# Patient Record
Sex: FEMALE | Race: WHITE | URBAN - METROPOLITAN AREA
[De-identification: names, ages, dates, MRNs, and addresses within clinical notes are randomized per-mention and may not be internally consistent; named-entity substitution may affect disease eponyms.]

---

## 2020-11-25 ENCOUNTER — INPATIENT (INPATIENT)
Facility: HOSPITAL | Age: 80
LOS: 5 days | Discharge: HOME HEALTH | End: 2020-12-01
Attending: INTERNAL MEDICINE | Admitting: INTERNAL MEDICINE
Payer: MEDICARE

## 2020-11-25 VITALS
WEIGHT: 160.06 LBS | SYSTOLIC BLOOD PRESSURE: 149 MMHG | DIASTOLIC BLOOD PRESSURE: 68 MMHG | OXYGEN SATURATION: 89 % | RESPIRATION RATE: 22 BRPM | TEMPERATURE: 98 F | HEART RATE: 94 BPM

## 2020-11-25 DIAGNOSIS — J44.9 CHRONIC OBSTRUCTIVE PULMONARY DISEASE, UNSPECIFIED: ICD-10-CM

## 2020-11-25 DIAGNOSIS — J18.9 PNEUMONIA, UNSPECIFIED ORGANISM: ICD-10-CM

## 2020-11-25 DIAGNOSIS — U07.1 COVID-19: ICD-10-CM

## 2020-11-25 DIAGNOSIS — R09.02 HYPOXEMIA: ICD-10-CM

## 2020-11-25 LAB
ALBUMIN SERPL ELPH-MCNC: 3.4 G/DL — LOW (ref 3.5–5.2)
ALP SERPL-CCNC: 72 U/L — SIGNIFICANT CHANGE UP (ref 30–115)
ALT FLD-CCNC: 23 U/L — SIGNIFICANT CHANGE UP (ref 0–41)
ANION GAP SERPL CALC-SCNC: 10 MMOL/L — SIGNIFICANT CHANGE UP (ref 7–14)
APPEARANCE UR: CLEAR — SIGNIFICANT CHANGE UP
AST SERPL-CCNC: 31 U/L — SIGNIFICANT CHANGE UP (ref 0–41)
BACTERIA # UR AUTO: ABNORMAL
BASOPHILS # BLD AUTO: 0.01 K/UL — SIGNIFICANT CHANGE UP (ref 0–0.2)
BASOPHILS NFR BLD AUTO: 0.2 % — SIGNIFICANT CHANGE UP (ref 0–1)
BILIRUB SERPL-MCNC: <0.2 MG/DL — SIGNIFICANT CHANGE UP (ref 0.2–1.2)
BILIRUB UR-MCNC: NEGATIVE — SIGNIFICANT CHANGE UP
BUN SERPL-MCNC: 20 MG/DL — SIGNIFICANT CHANGE UP (ref 10–20)
CALCIUM SERPL-MCNC: 9 MG/DL — SIGNIFICANT CHANGE UP (ref 8.5–10.1)
CHLORIDE SERPL-SCNC: 104 MMOL/L — SIGNIFICANT CHANGE UP (ref 98–110)
CO2 SERPL-SCNC: 21 MMOL/L — SIGNIFICANT CHANGE UP (ref 17–32)
COLOR SPEC: YELLOW — SIGNIFICANT CHANGE UP
CREAT SERPL-MCNC: 1.2 MG/DL — SIGNIFICANT CHANGE UP (ref 0.7–1.5)
D DIMER BLD IA.RAPID-MCNC: 386 NG/ML DDU — HIGH (ref 0–230)
DIFF PNL FLD: NEGATIVE — SIGNIFICANT CHANGE UP
EOSINOPHIL # BLD AUTO: 0.01 K/UL — SIGNIFICANT CHANGE UP (ref 0–0.7)
EOSINOPHIL NFR BLD AUTO: 0.2 % — SIGNIFICANT CHANGE UP (ref 0–8)
EPI CELLS # UR: ABNORMAL /HPF
GLUCOSE BLDC GLUCOMTR-MCNC: 285 MG/DL — HIGH (ref 70–99)
GLUCOSE SERPL-MCNC: 126 MG/DL — HIGH (ref 70–99)
GLUCOSE UR QL: NEGATIVE MG/DL — SIGNIFICANT CHANGE UP
HCT VFR BLD CALC: 33.4 % — LOW (ref 37–47)
HGB BLD-MCNC: 10.3 G/DL — LOW (ref 12–16)
IMM GRANULOCYTES NFR BLD AUTO: 0.5 % — HIGH (ref 0.1–0.3)
KETONES UR-MCNC: NEGATIVE — SIGNIFICANT CHANGE UP
LEUKOCYTE ESTERASE UR-ACNC: NEGATIVE — SIGNIFICANT CHANGE UP
LYMPHOCYTES # BLD AUTO: 0.48 K/UL — LOW (ref 1.2–3.4)
LYMPHOCYTES # BLD AUTO: 11.1 % — LOW (ref 20.5–51.1)
MAGNESIUM SERPL-MCNC: 1.8 MG/DL — SIGNIFICANT CHANGE UP (ref 1.8–2.4)
MCHC RBC-ENTMCNC: 25.8 PG — LOW (ref 27–31)
MCHC RBC-ENTMCNC: 30.8 G/DL — LOW (ref 32–37)
MCV RBC AUTO: 83.7 FL — SIGNIFICANT CHANGE UP (ref 81–99)
MONOCYTES # BLD AUTO: 0.26 K/UL — SIGNIFICANT CHANGE UP (ref 0.1–0.6)
MONOCYTES NFR BLD AUTO: 6 % — SIGNIFICANT CHANGE UP (ref 1.7–9.3)
NEUTROPHILS # BLD AUTO: 3.55 K/UL — SIGNIFICANT CHANGE UP (ref 1.4–6.5)
NEUTROPHILS NFR BLD AUTO: 82 % — HIGH (ref 42.2–75.2)
NITRITE UR-MCNC: NEGATIVE — SIGNIFICANT CHANGE UP
NRBC # BLD: 0 /100 WBCS — SIGNIFICANT CHANGE UP (ref 0–0)
PH UR: 6 — SIGNIFICANT CHANGE UP (ref 5–8)
PLATELET # BLD AUTO: 241 K/UL — SIGNIFICANT CHANGE UP (ref 130–400)
POTASSIUM SERPL-MCNC: 4.7 MMOL/L — SIGNIFICANT CHANGE UP (ref 3.5–5)
POTASSIUM SERPL-SCNC: 4.7 MMOL/L — SIGNIFICANT CHANGE UP (ref 3.5–5)
PROT SERPL-MCNC: 6.2 G/DL — SIGNIFICANT CHANGE UP (ref 6–8)
PROT UR-MCNC: ABNORMAL MG/DL
RAPID RVP RESULT: DETECTED
RBC # BLD: 3.99 M/UL — LOW (ref 4.2–5.4)
RBC # FLD: 16.3 % — HIGH (ref 11.5–14.5)
SARS-COV-2 RNA SPEC QL NAA+PROBE: DETECTED
SODIUM SERPL-SCNC: 135 MMOL/L — SIGNIFICANT CHANGE UP (ref 135–146)
SP GR SPEC: 1.01 — SIGNIFICANT CHANGE UP (ref 1.01–1.03)
TROPONIN T SERPL-MCNC: <0.01 NG/ML — SIGNIFICANT CHANGE UP
UROBILINOGEN FLD QL: 0.2 MG/DL — SIGNIFICANT CHANGE UP (ref 0.2–0.2)
WBC # BLD: 4.33 K/UL — LOW (ref 4.8–10.8)
WBC # FLD AUTO: 4.33 K/UL — LOW (ref 4.8–10.8)
WBC UR QL: SIGNIFICANT CHANGE UP /HPF

## 2020-11-25 PROCEDURE — 99223 1ST HOSP IP/OBS HIGH 75: CPT

## 2020-11-25 PROCEDURE — 70450 CT HEAD/BRAIN W/O DYE: CPT | Mod: 26

## 2020-11-25 PROCEDURE — 71275 CT ANGIOGRAPHY CHEST: CPT | Mod: 26

## 2020-11-25 PROCEDURE — 99285 EMERGENCY DEPT VISIT HI MDM: CPT

## 2020-11-25 PROCEDURE — 99497 ADVNCD CARE PLAN 30 MIN: CPT | Mod: 25

## 2020-11-25 PROCEDURE — 71045 X-RAY EXAM CHEST 1 VIEW: CPT | Mod: 26

## 2020-11-25 RX ORDER — MEMANTINE HYDROCHLORIDE 10 MG/1
10 TABLET ORAL
Refills: 0 | Status: DISCONTINUED | OUTPATIENT
Start: 2020-11-25 | End: 2020-12-01

## 2020-11-25 RX ORDER — GABAPENTIN 400 MG/1
100 CAPSULE ORAL AT BEDTIME
Refills: 0 | Status: DISCONTINUED | OUTPATIENT
Start: 2020-11-25 | End: 2020-12-01

## 2020-11-25 RX ORDER — ATORVASTATIN CALCIUM 80 MG/1
40 TABLET, FILM COATED ORAL AT BEDTIME
Refills: 0 | Status: DISCONTINUED | OUTPATIENT
Start: 2020-11-25 | End: 2020-12-01

## 2020-11-25 RX ORDER — AZITHROMYCIN 500 MG/1
500 TABLET, FILM COATED ORAL EVERY 24 HOURS
Refills: 0 | Status: DISCONTINUED | OUTPATIENT
Start: 2020-11-25 | End: 2020-11-28

## 2020-11-25 RX ORDER — ASPIRIN/CALCIUM CARB/MAGNESIUM 324 MG
81 TABLET ORAL DAILY
Refills: 0 | Status: DISCONTINUED | OUTPATIENT
Start: 2020-11-25 | End: 2020-12-01

## 2020-11-25 RX ORDER — DEXAMETHASONE 0.5 MG/5ML
6 ELIXIR ORAL DAILY
Refills: 0 | Status: DISCONTINUED | OUTPATIENT
Start: 2020-11-25 | End: 2020-11-25

## 2020-11-25 RX ORDER — CEFTRIAXONE 500 MG/1
1000 INJECTION, POWDER, FOR SOLUTION INTRAMUSCULAR; INTRAVENOUS EVERY 24 HOURS
Refills: 0 | Status: DISCONTINUED | OUTPATIENT
Start: 2020-11-25 | End: 2020-11-28

## 2020-11-25 RX ORDER — DEXAMETHASONE 0.5 MG/5ML
6 ELIXIR ORAL ONCE
Refills: 0 | Status: COMPLETED | OUTPATIENT
Start: 2020-11-25 | End: 2020-11-25

## 2020-11-25 RX ORDER — NIFEDIPINE 30 MG
60 TABLET, EXTENDED RELEASE 24 HR ORAL DAILY
Refills: 0 | Status: DISCONTINUED | OUTPATIENT
Start: 2020-11-25 | End: 2020-12-01

## 2020-11-25 RX ORDER — AZITHROMYCIN 500 MG/1
500 TABLET, FILM COATED ORAL ONCE
Refills: 0 | Status: COMPLETED | OUTPATIENT
Start: 2020-11-25 | End: 2020-11-25

## 2020-11-25 RX ORDER — CEFTRIAXONE 500 MG/1
1000 INJECTION, POWDER, FOR SOLUTION INTRAMUSCULAR; INTRAVENOUS ONCE
Refills: 0 | Status: COMPLETED | OUTPATIENT
Start: 2020-11-25 | End: 2020-11-25

## 2020-11-25 RX ORDER — METFORMIN HYDROCHLORIDE 850 MG/1
500 TABLET ORAL
Refills: 0 | Status: DISCONTINUED | OUTPATIENT
Start: 2020-11-25 | End: 2020-11-26

## 2020-11-25 RX ORDER — IPRATROPIUM/ALBUTEROL SULFATE 18-103MCG
3 AEROSOL WITH ADAPTER (GRAM) INHALATION EVERY 6 HOURS
Refills: 0 | Status: DISCONTINUED | OUTPATIENT
Start: 2020-11-25 | End: 2020-12-01

## 2020-11-25 RX ADMIN — ATORVASTATIN CALCIUM 40 MILLIGRAM(S): 80 TABLET, FILM COATED ORAL at 22:57

## 2020-11-25 RX ADMIN — CEFTRIAXONE 100 MILLIGRAM(S): 500 INJECTION, POWDER, FOR SOLUTION INTRAMUSCULAR; INTRAVENOUS at 18:10

## 2020-11-25 RX ADMIN — AZITHROMYCIN 255 MILLIGRAM(S): 500 TABLET, FILM COATED ORAL at 19:30

## 2020-11-25 RX ADMIN — Medication 6 MILLIGRAM(S): at 18:11

## 2020-11-25 RX ADMIN — AZITHROMYCIN 255 MILLIGRAM(S): 500 TABLET, FILM COATED ORAL at 22:11

## 2020-11-25 RX ADMIN — GABAPENTIN 100 MILLIGRAM(S): 400 CAPSULE ORAL at 22:57

## 2020-11-25 RX ADMIN — CEFTRIAXONE 100 MILLIGRAM(S): 500 INJECTION, POWDER, FOR SOLUTION INTRAMUSCULAR; INTRAVENOUS at 22:11

## 2020-11-25 NOTE — H&P ADULT - ASSESSMENT
Pt is an 81 y/o female with Pneumonia/+COVID w hx COPD    IV abx  Decadron 6mg IV q24h  ID consult  Repeat labs in am  Monitor pulse ox, titrate to maintain Pox>90%  Airborne Isolation  Transfer to [facility] for further management  Duoneb  DVT Prophylaxis      DM/Hypercholesterolemia  Continue Metformin  Monitor FS  Carb consistent/DASH

## 2020-11-25 NOTE — ED ADULT NURSE NOTE - NS_NURSE_BED_LOCATION_ED_ALL_ED_FT
[facility] [id number] on Medical Surgical Unit

## 2020-11-25 NOTE — ED PROVIDER NOTE - CLINICAL SUMMARY MEDICAL DECISION MAKING FREE TEXT BOX
patient presents with increasing sob, we sent labs including ddimer which was mildly elevated followed by ct which was negative for pe she was found to be covid + I will admit to [facility] for further management family updated and agree with the plan of care.  patients history unreliable based on history of dementia but history corroborated by family

## 2020-11-25 NOTE — GOALS OF CARE CONVERSATION - ADVANCED CARE PLANNING - CONVERSATION DETAILS
Patient's overall medical care discussed with her and daughter Ines.  Aware of the current transfer to [place] for COVID - 19 PNA.  Patient with multiple comorbidities HTN. DM II, early onset Dementia, COPD; MOLST form discussed and patient wishes to remain FULL CODE including resuscitation and intubation if needed (confirmed with daughter over the phone)

## 2020-11-25 NOTE — H&P ADULT - HISTORY OF PRESENT ILLNESS
Pt is an 81 y/o female who presented to ER with a 2-3 day history of non productive cough and mild SOB. She denies CP, fever/chills, N/V/D, or additional complaints.   In ER pt found to be hypoxic at 88% on RA, which improved following application of 2L NC.   CXR pertinent for Right sided interstitial opacity. COVID +.

## 2020-11-25 NOTE — H&P ADULT - NSICDXPASTMEDICALHX_GEN_ALL_CORE_FT
PAST MEDICAL HISTORY:  COPD (chronic obstructive pulmonary disease)     Dementia     Depression     Diabetes mellitus, type 2     Essential hypertension     High blood cholesterol

## 2020-11-25 NOTE — ED PROVIDER NOTE - PHYSICAL EXAMINATION
Physical Exam    Vital Signs: I have reviewed the initial vital signs.  Constitutional: well-nourished, appears stated age, no acute distress  Eyes: Conjunctiva pink, Sclera clear  Cardiovascular: S1 and S2, regular rate, regular rhythm, well-perfused extremities, radial pulses equal and 2+, pedal pulses 2+ and equal   Respiratory: unlabored respiratory effort, clear to auscultation bilaterally no wheezing, rales and rhonchi  Gastrointestinal: soft, non-tender abdomen, no pulsatile mass, normal bowl sounds  Musculoskeletal: supple neck, no lower extremity edema, no midline tenderness  Integumentary: warm, dry, no rash  Neurologic: awake, alert, nvi

## 2020-11-25 NOTE — H&P ADULT - ATTENDING COMMENTS
Patient seen and examined at bedside independently of PA and agree with the H/P unless otherwise stated    1) Acute respiratory failure with hypoxia/COVID-19 PNA  -IV abx, one time decadron dose given in ED  -ID consult   -supplemental O2 to keep pulse ox > 90%  -transfer to [place]; hospitalist on call and MAR aware     2) COPD hx  -Pulmonary consult     3) Early onset Dementia/Mild cognitive impairment   -outpatient Psych follow up    4) CKD stage III (likely underlying HTN/DM II)  -needs to monitor kidney function and outpatient Nephrology follow up    5) HTN  -monitor BP and continue with home meds    6) DM II  -monitor FS  -on oral metformin     dvt and gi ppx   FULL CODE  --- see GOC     oob to chair as tolerated with Assistance       # Progress Note Handoff  PENDING as follows  consults: ID   Test: CBC  Family discussion: discussed with patient and daughter over the phone; aware of the above plan of care and agreeable for [place] transfer   Disposition: ACUTE     Attending Physician Dr. Jennifer Black # [id number]

## 2020-11-25 NOTE — H&P ADULT - NSHPPHYSICALEXAM_GEN_ALL_CORE
GEn NAD, A&Ox3            Vital Signs Last 24 Hrs  T(C): 36.4 (25 Nov 2020 12:03), Max: 36.4 (25 Nov 2020 12:03)  T(F): 97.6 (25 Nov 2020 12:03), Max: 97.6 (25 Nov 2020 12:03)  HR: 80 (25 Nov 2020 18:22) (76 - 94)  BP: 148/65 (25 Nov 2020 18:22) (132/63 - 151/67)  BP(mean): --  RR: 17 (25 Nov 2020 18:22) (17 - 22)  SpO2: 96% (25 Nov 2020 18:22) (89% - 96%)

## 2020-11-25 NOTE — H&P ADULT - NSHPLABSRESULTS_GEN_ALL_CORE
10.3   4.33  )-----------( 241      ( 2020 12:52 )             33.4           135  |  104  |  20  ----------------------------<  126<H>  4.7   |  21  |  1.2    Ca    9.0      2020 12:52  Mg     1.8         TPro  6.2  /  Alb  3.4<L>  /  TBili  <0.2  /  DBili  x   /  AST  31  /  ALT  23  /  AlkPhos  72                Urinalysis Basic - ( 2020 16:49 )    Color: Yellow / Appearance: Clear / S.015 / pH: x  Gluc: x / Ketone: Negative  / Bili: Negative / Urobili: 0.2 mg/dL   Blood: x / Protein: Trace mg/dL / Nitrite: Negative   Leuk Esterase: Negative / RBC: x / WBC 1-2 /HPF   Sq Epi: x / Non Sq Epi: Occasional /HPF / Bacteria: Few            Lactate Trend      CARDIAC MARKERS ( 2020 12:52 )  x     / <0.01 ng/mL / x     / x     / x            CAPILLARY BLOOD GLUCOSE          < from: Xray Chest 1 View-PORTABLE IMMEDIATE (Xray Chest 1 View-PORTABLE IMMEDIATE .) (20 @ 13:16) >    Impression:    Enlargement to the right hilum. Further evaluation with a PA and lateral view the chest is recommended    Right-sided interstitial opacities        < end of copied text >    < from: CT Head No Cont (20 @ 14:09) >    MPRESSION:    No acute intracranial pathology.    Mild  chronic microvascular ischemic changes.    < end of copied text >    < from: CT Angio Chest PE Protocol w/ IV Cont (20 @ 15:23) >    MPRESSION:    1. Bilateral, peripheral groundglass airspace opacities, which may be attributed to viral pneumonia in the appropriate clinical setting.    2. No evidence of acute pulmonary embolism.    3. Nonspecific prominent right hilar lymph node, 1.4 cm short axis, which may be reactive.          < end of copied text >

## 2020-11-25 NOTE — ED PROVIDER NOTE - ATTENDING CONTRIBUTION TO CARE
I was present for and supervised the key and critical aspects of the procedures performed during the care of the patient. I personally evaluated the patient. I reviewed the Resident’s or Physician Assistant’s note (as assigned above), and agree with the findings and plan except as documented in my note.  79 y/o F with PMHx HTN, DM, dementia and COPD presents with shortness of breath and dizziness x1 week. No fever, chills, cough, CP, diaphoresis or n/v/d. On exam: NCAT. PERRLA, EOMI. OP clear. Lungs no respiratory distress, CTAB. RRR, S1S2 noted. Radial pulses 2+ and equal, pedal pulses 2+ and equal. Abdomen soft, NT/ND, no rebound or guarding. FROM x4 extremities. No focal neuro deficits. A/P: COVID, labs, EKG CXR and re-eval.

## 2020-11-25 NOTE — ED PROVIDER NOTE - OBJECTIVE STATEMENT
79 yo female, pmh of dementia, copd, htn, hld, dm, depression, presents to ed for sob and lightheadedness. as per pt feels light headed and sob with exertion, mild, present for several days, no pain or radiation, daughter collaborates history. denies fever, chills, cp, le swelling, abd pain, nvd, dysuria.

## 2020-11-25 NOTE — ED PROVIDER NOTE - CHIEF COMPLAINT
Occupational Therapy Note: 
  
OT evaluation attempted and deferred. Per RN pt unavailable to participate at time of attempt. Will continue to follow and complete OT evaluation when pt available and appropriate. The patient is a 80y Female complaining of shortness of breath.

## 2020-11-25 NOTE — ED ADULT NURSE NOTE - NSIMPLEMENTINTERV_GEN_ALL_ED
Implemented All Fall with Harm Risk Interventions:  [place] to call system. Call bell, personal items and telephone within reach. Instruct patient to call for assistance. Room bathroom lighting operational. Non-slip footwear when patient is off stretcher. Physically safe environment: no spills, clutter or unnecessary equipment. Stretcher in lowest position, wheels locked, appropriate side rails in place. Provide visual cue, wrist band, yellow gown, etc. Monitor gait and stability. Monitor for mental status changes and reorient to person, place, and time. Review medications for side effects contributing to fall risk. Reinforce activity limits and safety measures with patient and family. Provide visual clues: red socks.

## 2020-11-25 NOTE — ED PROVIDER NOTE - PROGRESS NOTE DETAILS
I personally evaluated the patient. I reviewed the Resident’s or Physician Assistant’s note (as assigned above), and agree with the findings and plan except as documented in my note.  81 y/o F with PMHx HTN, DM, dementia and COPD presents with shortness of breath and dizziness x1 week. No fever, chills, cough, CP, diaphoresis or n/v/d. On exam: NCAT. PERRLA, EOMI. OP clear. Lungs no respiratory distress, CTAB. RRR, S1S2 noted. Radial pulses 2+ and equal, pedal pulses 2+ and equal. Abdomen soft, NT/ND, no rebound or guarding. FROM x4 extremities. No focal neuro deficits. A/P: COVID, labs, EKG CXR and re-eval.

## 2020-11-25 NOTE — ED ADULT NURSE NOTE - PMH
COPD (chronic obstructive pulmonary disease)    Dementia    Depression    Diabetes mellitus, type 2    Essential hypertension    High blood cholesterol

## 2020-11-26 LAB
ALBUMIN SERPL ELPH-MCNC: 3.7 G/DL — SIGNIFICANT CHANGE UP (ref 3.5–5.2)
ALP SERPL-CCNC: 79 U/L — SIGNIFICANT CHANGE UP (ref 30–115)
ALT FLD-CCNC: 24 U/L — SIGNIFICANT CHANGE UP (ref 0–41)
ANION GAP SERPL CALC-SCNC: 13 MMOL/L — SIGNIFICANT CHANGE UP (ref 7–14)
ANION GAP SERPL CALC-SCNC: 16 MMOL/L — HIGH (ref 7–14)
APTT BLD: 30.4 SEC — SIGNIFICANT CHANGE UP (ref 27–39.2)
AST SERPL-CCNC: 35 U/L — SIGNIFICANT CHANGE UP (ref 0–41)
BASOPHILS # BLD AUTO: 0.01 K/UL — SIGNIFICANT CHANGE UP (ref 0–0.2)
BASOPHILS NFR BLD AUTO: 0.2 % — SIGNIFICANT CHANGE UP (ref 0–1)
BILIRUB SERPL-MCNC: <0.2 MG/DL — SIGNIFICANT CHANGE UP (ref 0.2–1.2)
BUN SERPL-MCNC: 21 MG/DL — HIGH (ref 10–20)
BUN SERPL-MCNC: 30 MG/DL — HIGH (ref 10–20)
CALCIUM SERPL-MCNC: 9 MG/DL — SIGNIFICANT CHANGE UP (ref 8.5–10.1)
CALCIUM SERPL-MCNC: 9.6 MG/DL — SIGNIFICANT CHANGE UP (ref 8.5–10.1)
CHLORIDE SERPL-SCNC: 106 MMOL/L — SIGNIFICANT CHANGE UP (ref 98–110)
CHLORIDE SERPL-SCNC: 106 MMOL/L — SIGNIFICANT CHANGE UP (ref 98–110)
CO2 SERPL-SCNC: 19 MMOL/L — SIGNIFICANT CHANGE UP (ref 17–32)
CO2 SERPL-SCNC: 20 MMOL/L — SIGNIFICANT CHANGE UP (ref 17–32)
CREAT SERPL-MCNC: 1.1 MG/DL — SIGNIFICANT CHANGE UP (ref 0.7–1.5)
CREAT SERPL-MCNC: 1.5 MG/DL — SIGNIFICANT CHANGE UP (ref 0.7–1.5)
CULTURE RESULTS: SIGNIFICANT CHANGE UP
D DIMER BLD IA.RAPID-MCNC: 350 NG/ML DDU — HIGH (ref 0–230)
EOSINOPHIL # BLD AUTO: 0 K/UL — SIGNIFICANT CHANGE UP (ref 0–0.7)
EOSINOPHIL NFR BLD AUTO: 0 % — SIGNIFICANT CHANGE UP (ref 0–8)
GLUCOSE BLDC GLUCOMTR-MCNC: 111 MG/DL — HIGH (ref 70–99)
GLUCOSE BLDC GLUCOMTR-MCNC: 124 MG/DL — HIGH (ref 70–99)
GLUCOSE BLDC GLUCOMTR-MCNC: 162 MG/DL — HIGH (ref 70–99)
GLUCOSE BLDC GLUCOMTR-MCNC: 202 MG/DL — HIGH (ref 70–99)
GLUCOSE SERPL-MCNC: 154 MG/DL — HIGH (ref 70–99)
GLUCOSE SERPL-MCNC: 190 MG/DL — HIGH (ref 70–99)
HCT VFR BLD CALC: 37.2 % — SIGNIFICANT CHANGE UP (ref 37–47)
HGB BLD-MCNC: 11.4 G/DL — LOW (ref 12–16)
IMM GRANULOCYTES NFR BLD AUTO: 0.6 % — HIGH (ref 0.1–0.3)
INR BLD: 0.9 RATIO — SIGNIFICANT CHANGE UP (ref 0.65–1.3)
LDH SERPL L TO P-CCNC: 301 — HIGH (ref 50–242)
LYMPHOCYTES # BLD AUTO: 0.64 K/UL — LOW (ref 1.2–3.4)
LYMPHOCYTES # BLD AUTO: 13.9 % — LOW (ref 20.5–51.1)
MCHC RBC-ENTMCNC: 25.8 PG — LOW (ref 27–31)
MCHC RBC-ENTMCNC: 30.6 G/DL — LOW (ref 32–37)
MCV RBC AUTO: 84.2 FL — SIGNIFICANT CHANGE UP (ref 81–99)
MONOCYTES # BLD AUTO: 0.21 K/UL — SIGNIFICANT CHANGE UP (ref 0.1–0.6)
MONOCYTES NFR BLD AUTO: 4.5 % — SIGNIFICANT CHANGE UP (ref 1.7–9.3)
NEUTROPHILS # BLD AUTO: 3.73 K/UL — SIGNIFICANT CHANGE UP (ref 1.4–6.5)
NEUTROPHILS NFR BLD AUTO: 80.8 % — HIGH (ref 42.2–75.2)
NRBC # BLD: 0 /100 WBCS — SIGNIFICANT CHANGE UP (ref 0–0)
PLATELET # BLD AUTO: 284 K/UL — SIGNIFICANT CHANGE UP (ref 130–400)
POTASSIUM SERPL-MCNC: 4.4 MMOL/L — SIGNIFICANT CHANGE UP (ref 3.5–5)
POTASSIUM SERPL-MCNC: 5.7 MMOL/L — HIGH (ref 3.5–5)
POTASSIUM SERPL-SCNC: 4.4 MMOL/L — SIGNIFICANT CHANGE UP (ref 3.5–5)
POTASSIUM SERPL-SCNC: 5.7 MMOL/L — HIGH (ref 3.5–5)
PROCALCITONIN SERPL-MCNC: 0.11 NG/ML — HIGH (ref 0.02–0.1)
PROT SERPL-MCNC: 7 G/DL — SIGNIFICANT CHANGE UP (ref 6–8)
PROTHROM AB SERPL-ACNC: 10.3 SEC — SIGNIFICANT CHANGE UP (ref 9.95–12.87)
RBC # BLD: 4.42 M/UL — SIGNIFICANT CHANGE UP (ref 4.2–5.4)
RBC # FLD: 16.3 % — HIGH (ref 11.5–14.5)
SARS-COV-2 IGG SERPL QL IA: NEGATIVE — SIGNIFICANT CHANGE UP
SARS-COV-2 IGM SERPL IA-ACNC: 0.09 INDEX — SIGNIFICANT CHANGE UP
SODIUM SERPL-SCNC: 138 MMOL/L — SIGNIFICANT CHANGE UP (ref 135–146)
SODIUM SERPL-SCNC: 142 MMOL/L — SIGNIFICANT CHANGE UP (ref 135–146)
SPECIMEN SOURCE: SIGNIFICANT CHANGE UP
WBC # BLD: 4.62 K/UL — LOW (ref 4.8–10.8)
WBC # FLD AUTO: 4.62 K/UL — LOW (ref 4.8–10.8)

## 2020-11-26 PROCEDURE — 99233 SBSQ HOSP IP/OBS HIGH 50: CPT

## 2020-11-26 RX ORDER — ENOXAPARIN SODIUM 100 MG/ML
40 INJECTION SUBCUTANEOUS DAILY
Refills: 0 | Status: DISCONTINUED | OUTPATIENT
Start: 2020-11-26 | End: 2020-12-01

## 2020-11-26 RX ORDER — DEXAMETHASONE 0.5 MG/5ML
6 ELIXIR ORAL DAILY
Refills: 0 | Status: DISCONTINUED | OUTPATIENT
Start: 2020-11-26 | End: 2020-12-01

## 2020-11-26 RX ORDER — SODIUM ZIRCONIUM CYCLOSILICATE 10 G/10G
10 POWDER, FOR SUSPENSION ORAL ONCE
Refills: 0 | Status: COMPLETED | OUTPATIENT
Start: 2020-11-26 | End: 2020-11-26

## 2020-11-26 RX ADMIN — Medication 6 MILLIGRAM(S): at 17:07

## 2020-11-26 RX ADMIN — ENOXAPARIN SODIUM 40 MILLIGRAM(S): 100 INJECTION SUBCUTANEOUS at 17:08

## 2020-11-26 RX ADMIN — MEMANTINE HYDROCHLORIDE 10 MILLIGRAM(S): 10 TABLET ORAL at 17:08

## 2020-11-26 RX ADMIN — Medication 81 MILLIGRAM(S): at 11:07

## 2020-11-26 RX ADMIN — MEMANTINE HYDROCHLORIDE 10 MILLIGRAM(S): 10 TABLET ORAL at 06:26

## 2020-11-26 RX ADMIN — GABAPENTIN 100 MILLIGRAM(S): 400 CAPSULE ORAL at 21:28

## 2020-11-26 RX ADMIN — CEFTRIAXONE 100 MILLIGRAM(S): 500 INJECTION, POWDER, FOR SOLUTION INTRAMUSCULAR; INTRAVENOUS at 21:28

## 2020-11-26 RX ADMIN — METFORMIN HYDROCHLORIDE 500 MILLIGRAM(S): 850 TABLET ORAL at 06:26

## 2020-11-26 RX ADMIN — ATORVASTATIN CALCIUM 40 MILLIGRAM(S): 80 TABLET, FILM COATED ORAL at 21:28

## 2020-11-26 RX ADMIN — SODIUM ZIRCONIUM CYCLOSILICATE 10 GRAM(S): 10 POWDER, FOR SUSPENSION ORAL at 17:07

## 2020-11-26 RX ADMIN — Medication 30 MILLIGRAM(S): at 11:07

## 2020-11-26 RX ADMIN — Medication 60 MILLIGRAM(S): at 06:26

## 2020-11-26 RX ADMIN — AZITHROMYCIN 255 MILLIGRAM(S): 500 TABLET, FILM COATED ORAL at 21:29

## 2020-11-26 NOTE — PROGRESS NOTE ADULT - ASSESSMENT
Ms Curran is an 79 yo Woman with medical history of COPD (chronic obstructive pulmonary disease), Dementia, Depression, Diabetes mellitus type II, Essential hypertension, High blood cholesterol who presented to the hospital for non-productive cough and mild SOB.      #Acute respiratory failure with hypoxia 2/2 COVID-19 PNA  #Hx of COPD   desaturates to 88%  on 4L NC 94%   f/u ID consult   supplemental O2 to keep pulse ox > 90%  c/w Dexamethasone 6mg IV daily   Duoneb prn   c/w Ceftriaxone and Azithro for now    #Early onset Dementia/Mild cognitive impairment   c/w memantine    #CKD stage III   needs to monitor kidney function and outpatient Nephrology follow up    #HTN  /70  c/w Nifedipine 60mg daily     #DM II  monitor FS  ISS for now    Dispo: Acute   Full Code

## 2020-11-26 NOTE — PROGRESS NOTE ADULT - SUBJECTIVE AND OBJECTIVE BOX
Pt was seen and examined. No acute events overnight.    PAST MEDICAL & SURGICAL HISTORY:  Depression    Dementia    Essential hypertension    High blood cholesterol    Diabetes mellitus, type 2    COPD (chronic obstructive pulmonary disease)    No significant past surgical history    Allergies    No Known Allergies    MEDICATIONS  (STANDING):  albuterol/ipratropium for Nebulization 3 milliLiter(s) Nebulizer every 6 hours  aspirin  chewable 81 milliGRAM(s) Oral daily  atorvastatin 40 milliGRAM(s) Oral at bedtime  azithromycin  IVPB 500 milliGRAM(s) IV Intermittent every 24 hours  cefTRIAXone   IVPB 1000 milliGRAM(s) IV Intermittent every 24 hours  dexAMETHasone  Injectable 6 milliGRAM(s) IV Push daily  enoxaparin Injectable 40 milliGRAM(s) SubCutaneous daily  gabapentin 100 milliGRAM(s) Oral at bedtime  memantine 10 milliGRAM(s) Oral two times a day  NIFEdipine XL 60 milliGRAM(s) Oral daily  PARoxetine 30 milliGRAM(s) Oral daily    MEDICATIONS  (PRN):    ROS: All other review of systems negative, except as noted above    Vital Signs Last 24 Hrs  T(C): 36.2 (26 Nov 2020 16:57), Max: 37.2 (26 Nov 2020 12:20)  T(F): 97.1 (26 Nov 2020 16:57), Max: 98.9 (26 Nov 2020 12:20)  HR: 97 (26 Nov 2020 16:57) (80 - 97)  BP: 136/70 (26 Nov 2020 16:57) (107/61 - 165/74)  BP(mean): --  RR: 19 (26 Nov 2020 16:57) (16 - 19)  SpO2: 94% (26 Nov 2020 16:57) (88% - 96%)    Physical Exam:  Constitutional: Not in acute distress  SKIN: No rashes or lesions  HEENT: Atraumatic. Normocephalic. Anicteric  NECK:  No JVD.   PULMONARY: Clear Auscultation bilateraly. non labored respirations.   Cardiovascular: Normal S1, S2. Regular rate and rhythm. No murmurs.  ABDOMEN: Soft, Nontender, Nondistended; Bowel sounds are present  EXTREMITIES:  Non edematous, non cyanotic  NEUROLOGIC:  Alert & oriented x 3, No motor deficit.  PSYCH: normal affect    Labs:   CBC                        11.4   4.62  )-----------( 284      ( 26 Nov 2020 06:02 )             37.2   CMP  11-26    142  |  106  |  21<H>  ----------------------------<  154<H>  5.7<H>   |  20  |  1.1    Ca    9.6      26 Nov 2020 06:02  Mg     1.8     11-25    TPro  7.0  /  Alb  3.7  /  TBili  <0.2  /  DBili  x   /  AST  35  /  ALT  24  /  AlkPhos  79  11-26    Radiology:  CT Chest   IMPRESSION:    1. Bilateral, peripheral groundglass airspace opacities, which may be attributed to viral pneumonia in the appropriate clinical setting.    2. No evidence of acute pulmonary embolism.    3. Nonspecific prominent right hilar lymph node, 1.4 cm short axis, which may be reactive.

## 2020-11-27 LAB
ALBUMIN SERPL ELPH-MCNC: 3.6 G/DL — SIGNIFICANT CHANGE UP (ref 3.5–5.2)
ALP SERPL-CCNC: 76 U/L — SIGNIFICANT CHANGE UP (ref 30–115)
ALT FLD-CCNC: 22 U/L — SIGNIFICANT CHANGE UP (ref 0–41)
ANION GAP SERPL CALC-SCNC: 14 MMOL/L — SIGNIFICANT CHANGE UP (ref 7–14)
AST SERPL-CCNC: 35 U/L — SIGNIFICANT CHANGE UP (ref 0–41)
BASOPHILS # BLD AUTO: 0 K/UL — SIGNIFICANT CHANGE UP (ref 0–0.2)
BASOPHILS NFR BLD AUTO: 0 % — SIGNIFICANT CHANGE UP (ref 0–1)
BILIRUB SERPL-MCNC: <0.2 MG/DL — SIGNIFICANT CHANGE UP (ref 0.2–1.2)
BUN SERPL-MCNC: 24 MG/DL — HIGH (ref 10–20)
CALCIUM SERPL-MCNC: 9.4 MG/DL — SIGNIFICANT CHANGE UP (ref 8.5–10.1)
CHLORIDE SERPL-SCNC: 105 MMOL/L — SIGNIFICANT CHANGE UP (ref 98–110)
CO2 SERPL-SCNC: 22 MMOL/L — SIGNIFICANT CHANGE UP (ref 17–32)
CREAT SERPL-MCNC: 1.1 MG/DL — SIGNIFICANT CHANGE UP (ref 0.7–1.5)
D DIMER BLD IA.RAPID-MCNC: 300 NG/ML DDU — HIGH (ref 0–230)
EOSINOPHIL # BLD AUTO: 0 K/UL — SIGNIFICANT CHANGE UP (ref 0–0.7)
EOSINOPHIL NFR BLD AUTO: 0 % — SIGNIFICANT CHANGE UP (ref 0–8)
GLUCOSE BLDC GLUCOMTR-MCNC: 145 MG/DL — HIGH (ref 70–99)
GLUCOSE BLDC GLUCOMTR-MCNC: 153 MG/DL — HIGH (ref 70–99)
GLUCOSE BLDC GLUCOMTR-MCNC: 177 MG/DL — HIGH (ref 70–99)
GLUCOSE BLDC GLUCOMTR-MCNC: 249 MG/DL — HIGH (ref 70–99)
GLUCOSE SERPL-MCNC: 146 MG/DL — HIGH (ref 70–99)
HCT VFR BLD CALC: 36.8 % — LOW (ref 37–47)
HGB BLD-MCNC: 11.4 G/DL — LOW (ref 12–16)
IMM GRANULOCYTES NFR BLD AUTO: 0.3 % — SIGNIFICANT CHANGE UP (ref 0.1–0.3)
LYMPHOCYTES # BLD AUTO: 0.56 K/UL — LOW (ref 1.2–3.4)
LYMPHOCYTES # BLD AUTO: 7 % — LOW (ref 20.5–51.1)
MCHC RBC-ENTMCNC: 25.9 PG — LOW (ref 27–31)
MCHC RBC-ENTMCNC: 31 G/DL — LOW (ref 32–37)
MCV RBC AUTO: 83.4 FL — SIGNIFICANT CHANGE UP (ref 81–99)
MONOCYTES # BLD AUTO: 0.23 K/UL — SIGNIFICANT CHANGE UP (ref 0.1–0.6)
MONOCYTES NFR BLD AUTO: 2.9 % — SIGNIFICANT CHANGE UP (ref 1.7–9.3)
NEUTROPHILS # BLD AUTO: 7.16 K/UL — HIGH (ref 1.4–6.5)
NEUTROPHILS NFR BLD AUTO: 89.8 % — HIGH (ref 42.2–75.2)
NRBC # BLD: 0 /100 WBCS — SIGNIFICANT CHANGE UP (ref 0–0)
PLATELET # BLD AUTO: 326 K/UL — SIGNIFICANT CHANGE UP (ref 130–400)
POTASSIUM SERPL-MCNC: 4.9 MMOL/L — SIGNIFICANT CHANGE UP (ref 3.5–5)
POTASSIUM SERPL-SCNC: 4.9 MMOL/L — SIGNIFICANT CHANGE UP (ref 3.5–5)
PROT SERPL-MCNC: 7.1 G/DL — SIGNIFICANT CHANGE UP (ref 6–8)
RBC # BLD: 4.41 M/UL — SIGNIFICANT CHANGE UP (ref 4.2–5.4)
RBC # FLD: 16 % — HIGH (ref 11.5–14.5)
SODIUM SERPL-SCNC: 141 MMOL/L — SIGNIFICANT CHANGE UP (ref 135–146)
WBC # BLD: 7.97 K/UL — SIGNIFICANT CHANGE UP (ref 4.8–10.8)
WBC # FLD AUTO: 7.97 K/UL — SIGNIFICANT CHANGE UP (ref 4.8–10.8)

## 2020-11-27 PROCEDURE — 99233 SBSQ HOSP IP/OBS HIGH 50: CPT

## 2020-11-27 RX ADMIN — ENOXAPARIN SODIUM 40 MILLIGRAM(S): 100 INJECTION SUBCUTANEOUS at 11:43

## 2020-11-27 RX ADMIN — Medication 81 MILLIGRAM(S): at 11:43

## 2020-11-27 RX ADMIN — GABAPENTIN 100 MILLIGRAM(S): 400 CAPSULE ORAL at 21:56

## 2020-11-27 RX ADMIN — Medication 6 MILLIGRAM(S): at 05:22

## 2020-11-27 RX ADMIN — MEMANTINE HYDROCHLORIDE 10 MILLIGRAM(S): 10 TABLET ORAL at 17:01

## 2020-11-27 RX ADMIN — MEMANTINE HYDROCHLORIDE 10 MILLIGRAM(S): 10 TABLET ORAL at 05:23

## 2020-11-27 RX ADMIN — ATORVASTATIN CALCIUM 40 MILLIGRAM(S): 80 TABLET, FILM COATED ORAL at 21:56

## 2020-11-27 RX ADMIN — CEFTRIAXONE 100 MILLIGRAM(S): 500 INJECTION, POWDER, FOR SOLUTION INTRAMUSCULAR; INTRAVENOUS at 21:56

## 2020-11-27 RX ADMIN — Medication 60 MILLIGRAM(S): at 05:23

## 2020-11-27 RX ADMIN — AZITHROMYCIN 255 MILLIGRAM(S): 500 TABLET, FILM COATED ORAL at 21:56

## 2020-11-27 RX ADMIN — Medication 30 MILLIGRAM(S): at 11:43

## 2020-11-27 NOTE — PROGRESS NOTE ADULT - SUBJECTIVE AND OBJECTIVE BOX
SUBJECTIVE:    Patient is a 80y old Female who presents with a chief complaint of Acute respiratory failure with hypoxia (2020 08:58)    Currently admitted to medicine with the primary diagnosis of COVID-19       Today is hospital day 2d. This morning she is resting comfortably in bed and reports no new issues or overnight events.     INTERVAL EVENTS:   no acute events    PAST MEDICAL & SURGICAL HISTORY  Depression    Dementia    Essential hypertension    High blood cholesterol    Diabetes mellitus, type 2    COPD (chronic obstructive pulmonary disease)    No significant past surgical history        ALLERGIES:  No Known Allergies    MEDICATIONS:  STANDING MEDICATIONS  albuterol/ipratropium for Nebulization 3 milliLiter(s) Nebulizer every 6 hours  aspirin  chewable 81 milliGRAM(s) Oral daily  atorvastatin 40 milliGRAM(s) Oral at bedtime  azithromycin  IVPB 500 milliGRAM(s) IV Intermittent every 24 hours  cefTRIAXone   IVPB 1000 milliGRAM(s) IV Intermittent every 24 hours  dexAMETHasone  Injectable 6 milliGRAM(s) IV Push daily  enoxaparin Injectable 40 milliGRAM(s) SubCutaneous daily  gabapentin 100 milliGRAM(s) Oral at bedtime  memantine 10 milliGRAM(s) Oral two times a day  NIFEdipine XL 60 milliGRAM(s) Oral daily  PARoxetine 30 milliGRAM(s) Oral daily    PRN MEDICATIONS    VITALS:   T(F): 96.3  HR: 86  BP: 157/72  RR: 18  SpO2: 95%    LABS:                        11.4   7.97  )-----------( 326      ( 2020 08:01 )             36.8     11-27    141  |  105  |  24<H>  ----------------------------<  146<H>  4.9   |  22  |  1.1    Ca    9.4      2020 08:01  Mg     1.8     11-25    TPro  7.1  /  Alb  3.6  /  TBili  <0.2  /  DBili  x   /  AST  35  /  ALT  22  /  AlkPhos  76  11-    PT/INR - ( 2020 06:02 )   PT: 10.30 sec;   INR: 0.90 ratio         PTT - ( 2020 06:02 )  PTT:30.4 sec  Urinalysis Basic - ( 2020 16:49 )    Color: Yellow / Appearance: Clear / S.015 / pH: x  Gluc: x / Ketone: Negative  / Bili: Negative / Urobili: 0.2 mg/dL   Blood: x / Protein: Trace mg/dL / Nitrite: Negative   Leuk Esterase: Negative / RBC: x / WBC 1-2 /HPF   Sq Epi: x / Non Sq Epi: Occasional /HPF / Bacteria: Few            Culture - Urine (collected 2020 16:49)  Source: .Urine Clean Catch (Midstream)  Final Report (2020 22:23):    <10,000 CFU/mL Normal Urogenital Samira    Culture - Blood (collected 2020 16:24)  Source: .Blood Blood-Peripheral  Preliminary Report (2020 23:01):    No growth to date.    Culture - Blood (collected 2020 16:24)  Source: .Blood Blood-Peripheral  Preliminary Report (2020 23:):    No growth to date.      CARDIAC MARKERS ( 2020 12:52 )  x     / <0.01 ng/mL / x     / x     / x          RADIOLOGY:    PHYSICAL EXAM:  GEN: No acute distress  PULM/CHEST: Clear to auscultation bilaterally, no rales, rhonchi or wheezes   CVS: Regular rate and rhythm, S1-S2, no murmurs  ABD: Soft, non-tender, non-distended, +BS  EXT: No edema  NEURO: AAOx3    Shah Catheter:

## 2020-11-27 NOTE — CONSULT NOTE ADULT - ASSESSMENT
79 yo F with PMHx of COPD, Dementia, Depression, Diabetes mellitus type II, Essential hypertension, and hyperlipidemia who presented to the hospital for 2-3 days of non-productive cough and mild SOB. Received afebrile and hemodynamically stable, but hypoxic on RA. Hypoxia improved with supplemental O2 via nasal cannula. Labs and imaging consistent with viral PNA, likely COVID-19 PNA. COVID+ on 11/25. ID consulted for acute hypoxic respiratory failure, likely secondary to COVID-19 PNA.     #Acute hypoxic respiratory failure  #COPD  - Likely secondary to COVID-19 PNA vs acute on chronic COPD  - Saturating well on nasal cannula  - Inflammatory markers mildly elevated  - Maintain SpO2 90-93%  - Prone position for 16 hours/day if patient tolerates  - S/p Decadron 6mg IVP x1 in the ED  - C/w Decadron 6mg IVP daily for 10 days and monitor for adverse effects (such as hyperglycemia and confusion)  - Would hold off on remdesivir given borderline renal function (likely KAITY secondary to CT angio)  - Trend inflammatory markers. If trending up, then toci 400mg x1  - Would continue with Rocephin and azithromycin for now  - Obtain ferritin levels  - C/w prophylactic dose Lovenox   79 yo F with PMHx of COPD, Dementia, Depression, Diabetes mellitus type II, Essential hypertension, and hyperlipidemia who presented to the hospital for 2-3 days of non-productive cough and mild SOB. Received afebrile and hemodynamically stable, but hypoxic on RA. Hypoxia improved with supplemental O2 via nasal cannula. Labs and imaging consistent with viral PNA, likely COVID-19 PNA. COVID+ on 11/25. ID consulted for acute hypoxic respiratory failure, likely secondary to COVID-19 PNA.     #Acute hypoxic respiratory failure  #COPD  - Likely secondary to COVID-19 PNA vs acute on chronic COPD  - Saturating well on nasal cannula  - Inflammatory markers mildly elevated  - Maintain SpO2 90-93%  - Prone position for 16 hours/day if patient tolerates  - S/p Decadron 6mg IVP x1 in the ED  - C/w Decadron 6mg IVP daily for 10 days and monitor for adverse effects (such as hyperglycemia and confusion)  - Would hold off on remdesivir given borderline renal function (likely KAITY secondary to CT angio)  - Trend inflammatory markers. If trending up, then toci 400mg x1  - Would continue with Rocephin and azithromycin for now  - Obtain ferritin levels  - Obtain ESR, CRP  - C/w prophylactic dose Lovenox   79 yo F with PMHx of COPD, Dementia, Depression, Diabetes mellitus type II, Essential hypertension, and hyperlipidemia who presented to the hospital for 2-3 days of non-productive cough and mild SOB. Received afebrile and hemodynamically stable, but hypoxic on RA. Hypoxia improved with supplemental O2 via nasal cannula. Labs and imaging consistent with viral PNA, likely COVID-19 PNA. COVID+ on 11/25. ID consulted for acute hypoxic respiratory failure, likely secondary to COVID-19 PNA.     #Acute hypoxic respiratory failure  #COPD  - Likely secondary to COVID-19 PNA vs acute on chronic COPD  - Saturating well on nasal cannula  - Inflammatory markers mildly elevated  - Maintain SpO2 90-93%  - Prone position for 16 hours/day if patient tolerates  - S/p Decadron 6mg IVP x1 in the ED  - C/w Decadron 6mg IVP daily for 10 days and monitor for adverse effects (such as hyperglycemia and confusion)  - Would hold off on remdesivir given borderline renal function (likely KAITY secondary to CT angio)  - Give 2 units of convalescent plasma  - Trend inflammatory markers. If trending up, then toci 400mg x1  - Would continue with Rocephin and azithromycin for now  - Obtain ferritin levels  - Obtain ESR, CRP  - C/w prophylactic dose Lovenox   81 yo F with PMHx of COPD, Dementia, Depression, Diabetes mellitus type II, Essential hypertension, and hyperlipidemia who presented to the hospital for 2-3 days of non-productive cough and mild SOB. Received afebrile and hemodynamically stable, but hypoxic on RA. Hypoxia improved with supplemental O2 via nasal cannula. Labs and imaging consistent with viral PNA, likely COVID-19 PNA. COVID+ on 11/25. ID consulted for acute hypoxic respiratory failure, likely secondary to COVID-19 PNA.     #Acute hypoxic respiratory failure  #COPD  - Likely secondary to COVID-19 PNA vs acute on chronic COPD  - Saturating 91-96% on 3L nasal cannula  - Inflammatory markers mildly elevated  - Maintain SpO2 90-93%  - Prone position for 16 hours/day if patient tolerates  - S/p Decadron 6mg IVP x1 in the ED  - C/w Decadron 6mg IVP daily for 10 days and monitor for adverse effects (such as hyperglycemia and confusion)  - Would hold off on remdesivir given borderline renal function (likely KAITY secondary to CT angio)  - Give 2 units of convalescent plasma  - Trend inflammatory markers. If trending up, then toci 400mg x1  - Would continue with Rocephin and azithromycin for now  - Obtain ferritin levels  - Obtain ESR, CRP  - C/w prophylactic dose Lovenox   81 yo F with PMHx of COPD, Dementia, Depression, Diabetes mellitus type II, Essential hypertension, and hyperlipidemia who presented to the hospital for 2-3 days of non-productive cough and mild SOB. COVID+ on 11/25. ID consulted for acute hypoxic respiratory failure likely secondary to COVID-19 PNA.     #Acute hypoxic respiratory failure  #COPD  - Likely secondary to COVID-19 PNA vs acute on chronic COPD  - Saturating 91-96% on 3L nasal cannula  - Maintain SpO2 90-93%  - Prone position for 16 hours/day if patient tolerates  - S/p Decadron 6mg IVP x1 in the ED  - C/w Decadron 6mg IVP daily for 10 days and monitor for adverse effects (such as hyperglycemia and confusion)  - Would hold off on remdesivir given borderline renal function (possible KAITY secondary to recent CT angio)  - Give 2 units of convalescent plasma  - D-dimer 300 (trending down), , procal 0.11  - Trend inflammatory markers. If trending up, then Toci 400mg x1  - Would continue with Rocephin and azithromycin for now  - Obtain ferritin levels  - Obtain ESR, CRP  - C/w prophylactic dose Lovenox   79 yo F with PMHx of COPD, Dementia, Depression, Diabetes mellitus type II, Essential hypertension, and hyperlipidemia who presented to the hospital for 2-3 days of non-productive cough and mild SOB. COVID+ on 11/25. ID consulted for acute hypoxic respiratory failure likely secondary to COVID-19 PNA.     #Acute hypoxic respiratory failure  #COPD  - Likely secondary to COVID-19 PNA vs less likely acute on chronic COPD  - Saturating 91-96% on 3L nasal cannula  - Maintain SpO2 90-93%  - Prone position for 16 hours/day if patient tolerates  - S/p Decadron 6mg IVP x1 in the ED  - C/w Decadron 6mg IVP daily for 10 days or until discharge and monitor for adverse effects (such as hyperglycemia and confusion)  - Give Remdesivir:  Day 1 -> 200mg IV loading dose  Days 2-5 -> 100mg IV maintenance dose  - Outside of window for convalescent plasma  - D-dimer 300 (trending down), , procal 0.11  - Obtain ESR, CRP, ferritin levels  - Trend inflammatory markers. If trending up, then Toci 400mg x1 and repeat inflammatory markers Q48H.  - Can discontinue Rocephin and azithromycin  - C/w prophylactic dose Lovenox   79 yo F with PMHx of COPD, Dementia, Depression, Diabetes mellitus type II, Essential hypertension, and hyperlipidemia who presented to the hospital for 2-3 days of non-productive cough and mild SOB. COVID+ on 11/25. ID consulted for acute hypoxic respiratory failure likely secondary to COVID-19 PNA.     #Acute hypoxic respiratory failure  #COPD  - Likely secondary to COVID-19 PNA vs less likely acute on chronic COPD  - Saturating 91-96% on 3L nasal cannula  - Maintain SpO2 90-93%  - Prone position for 16 hours/day if patient tolerates  - S/p Decadron 6mg IVP x1 in the ED  - C/w Decadron 6mg IVP daily for 10 days or until discharge and monitor for adverse effects (such as hyperglycemia and confusion)  - Give Remdesivir:  Day 1 -> 200mg IV loading dose  Days 2-5 -> 100mg IV maintenance dose  - Outside of window for convalescent plasma  - D-dimer 300 (trending down), , procal 0.11  - Obtain ESR, CRP, ferritin levels  - Trend inflammatory markers. If trending up, then Toci 400mg x1 and repeat inflammatory markers Q48H.  - Can discontinue Rocephin and azithromycin  - C/w Lovenox 40mg daily   79 yo F with PMHx of COPD, Dementia, Depression, Diabetes mellitus type II, Essential hypertension, and hyperlipidemia who presented to the hospital for 2-3 days of non-productive cough and mild SOB. COVID+ on 11/25. ID consulted for acute hypoxic respiratory failure likely secondary to COVID-19 PNA.     #Acute hypoxic respiratory failure  #COPD  - Likely secondary to COVID-19 PNA vs less likely acute on chronic COPD  - Saturating 91-96% on 3L nasal cannula  - Maintain SpO2 90-93%  - Prone position for 16 hours/day if patient tolerates  - S/p Decadron 6mg IVP x1 in the ED  - C/w Decadron 6mg IVP daily for 10 days or until discharge and monitor for adverse effects (such as hyperglycemia and confusion)  - Give Remdesivir:  Day 1 -> 200mg IV loading dose  Days 2-5 -> 100mg IV maintenance dose  - Outside of window for convalescent plasma  - D-dimer 300 (trending down), , procal 0.11  - Obtain ESR, CRP, ferritin levels  - Would not give Toci at this time as inflammatory markers are not markedly elevated but will need to see full panel of inflammatory markers   - Can discontinue Rocephin and azithromycin  - C/w Lovenox 40mg daily   79 yo F with PMHx of COPD, Dementia, Depression, Diabetes mellitus type II, Essential hypertension, and hyperlipidemia who presented to the hospital for 2-3 days of non-productive cough and mild SOB. COVID+ on 11/25. ID consulted for acute hypoxic respiratory failure likely secondary to COVID-19 PNA.     #Acute hypoxic respiratory failure  #COPD  - Likely secondary to COVID-19 PNA vs less likely acute on chronic COPD  - Saturating 91-96% on 3L nasal cannula  - Maintain SpO2 90-93%  - Prone position for 16 hours/day if patient tolerates  - S/p Decadron 6mg IVP x1 in the ED  - C/w Decadron 6mg IVP daily for 10 days or until discharge and monitor for adverse effects (such as hyperglycemia and confusion)  - Give Remdesivir:  Day 1 -> 200mg IV loading dose  Days 2-5 -> 100mg IV maintenance dose  - Outside of window for convalescent plasma  - D-dimer 300 (trending down), , procal 0.11  - Obtain CRP, ferritin levels  - Would not give Toci at this time as inflammatory markers are not markedly elevated but will need to see full panel of inflammatory markers   - Can discontinue Rocephin and azithromycin  - C/w Lovenox 40mg daily

## 2020-11-27 NOTE — CONSULT NOTE ADULT - SUBJECTIVE AND OBJECTIVE BOX
Consultation Requested by: George Whalen    Patient is a 80y old  Female who presents with a chief complaint of Non productive cough and mild SOB (2020 17:58)    HPI:    Pt is an 81 y/o female who presented to the ER with a 2-3 day history of non productive cough and mild SOB. She denied CP, fever/chills, N/V/D, or additional complaints.    On admission, received afebrile and hemodynamically stable, but hypoxic on RA to 88%. SpO2 improved with administration of supplemental O2 via N/C. S/p azithromycin and Rocephin in the ED. S/p 6mg decadron IV x1 in the ED. No leukocytosis. Lymphopenic. Inflammatory markers mildly elevated -> D-dimer 350 (down from 386), procal 0.11, . Blood cultures show NGTD. U/A and Urine Cx negative. CXR significant for right-sided interstitial opacities. CT chest significant for bilateral, peripheral ground-glass opacities.    COVID+ on .       REVIEW OF SYSTEMS  All review of systems negative, except for those marked:  General:		[] Abnormal:  	[] Night Sweats		[] Fever		[] Weight Loss  Pulmonary/Cough:	[X] Abnormal: cough, shortness of breath  Cardiac/Chest Pain:	[] Abnormal:  Gastrointestinal:	[] Abnormal:  Eyes:			[] Abnormal:  ENT:			[] Abnormal:  Dysuria:		[] Abnormal:  Musculoskeletal	:	[] Abnormal:  Endocrine:		[] Abnormal:  Lymph Nodes:		[] Abnormal:  Headache:		[] Abnormal:  Skin:			[] Abnormal:  Allergy/Immune:	[] Abnormal:  Psychiatric:		[] Abnormal:  [] All other review of systems negative  [] Unable to obtain (explain):    Recent Ill Contacts:	[] No	[] Yes:  Recent Travel History:	[] No	[] Yes:  Recent Animal/Insect Exposure/Tick Bites:	[] No	[] Yes:    Allergies    No Known Allergies    Intolerances      Antimicrobials:  azithromycin  IVPB 500 milliGRAM(s) IV Intermittent every 24 hours  cefTRIAXone   IVPB 1000 milliGRAM(s) IV Intermittent every 24 hours      Other Medications:  albuterol/ipratropium for Nebulization 3 milliLiter(s) Nebulizer every 6 hours  aspirin  chewable 81 milliGRAM(s) Oral daily  atorvastatin 40 milliGRAM(s) Oral at bedtime  dexAMETHasone  Injectable 6 milliGRAM(s) IV Push daily  enoxaparin Injectable 40 milliGRAM(s) SubCutaneous daily  gabapentin 100 milliGRAM(s) Oral at bedtime  memantine 10 milliGRAM(s) Oral two times a day  NIFEdipine XL 60 milliGRAM(s) Oral daily  PARoxetine 30 milliGRAM(s) Oral daily      FAMILY HISTORY:  No pertinent family history in first degree relatives      PAST MEDICAL & SURGICAL HISTORY:  Depression    Dementia    Essential hypertension    High blood cholesterol    Diabetes mellitus, type 2    COPD (chronic obstructive pulmonary disease)    No significant past surgical history      SOCIAL HISTORY:    IMMUNIZATIONS  [] Up to Date		[] Not Up to Date:  Recent Immunizations:	[] No	[] Yes:    Daily     Daily   Head Circumference:  Vital Signs Last 24 Hrs  T(C): 35.7 (2020 08:40), Max: 37.2 (2020 12:20)  T(F): 96.3 (2020 08:40), Max: 98.9 (2020 12:20)  HR: 86 (2020 08:40) (74 - 97)  BP: 157/72 (2020 08:40) (107/61 - 168/74)  BP(mean): --  RR: 18 (2020 08:40) (18 - 19)  SpO2: 96% (2020 08:40) (91% - 96%)    PHYSICAL EXAM  All physical exam findings normal, except for those marked:  General:	Normal: alert, neither acutely nor chronically ill-appearing, well developed/well   .		nourished, no respiratory distress  .		[] Abnormal:  Eyes		Normal: no conjunctival injection, no discharge, no photophobia, intact   .		extraocular movements, sclera not icteric  .		[] Abnormal:  ENT:		Normal: normal tympanic membranes; external ear normal, nares normal without   .		discharge, no pharyngeal erythema or exudates, no oral mucosal lesions, normal   .		tongue and lips  .		[] Abnormal:  Neck		Normal: supple, full range of motion, no nuchal rigidity  .		[] Abnormal:  Lymph Nodes	Normal: normal size and consistency, non-tender  .		[] Abnormal:  Cardiovascular	Normal: regular rate and variability; Normal S1, S2; No murmur  .		[] Abnormal:  Respiratory	Normal: no wheezing or crackles, bilateral audible breath sounds, no retractions  .		[] Abnormal:  Abdominal	Normal: soft; non-distended; non-tender; no hepatosplenomegaly or masses  .		[] Abnormal:  		Normal: normal external genitalia, no rash  .		[] Abnormal:  Extremities	Normal: FROM x4, no cyanosis or edema, symmetric pulses  .		[] Abnormal:  Skin		Normal: skin intact and not indurated; no rash, no desquamation  .		[] Abnormal:  Neurologic	Normal: alert, oriented as age-appropriate, affect appropriate; no weakness, no   .		facial asymmetry, moves all extremities, normal gait-child older than 18 months  .		[] Abnormal:  Musculoskeletal		Normal: no joint swelling, erythema, or tenderness; full range of motion   .			with no contractures; no muscle tenderness; no clubbing; no cyanosis;   .			no edema  .			[] Abnormal    Respiratory Support:		[] No	[] Yes:  Vasoactive medication infusion:	[] No	[] Yes:  Venous catheters:		[] No	[] Yes:  Bladder catheter:		[] No	[] Yes:  Other catheters or tubes:	[] No	[] Yes:    Lab Results:                        11.4   4.62  )-----------( 284      ( 2020 06:02 )             37.2         138  |  106  |  30<H>  ----------------------------<  190<H>  4.4   |  19  |  1.5    Ca    9.0      2020 18:40  Mg     1.8         TPro  7.0  /  Alb  3.7  /  TBili  <0.2  /  DBili  x   /  AST  35  /  ALT  24  /  AlkPhos  79      LIVER FUNCTIONS - ( 2020 06:02 )  Alb: 3.7 g/dL / Pro: 7.0 g/dL / ALK PHOS: 79 U/L / ALT: 24 U/L / AST: 35 U/L / GGT: x           PT/INR - ( 2020 06:02 )   PT: 10.30 sec;   INR: 0.90 ratio         PTT - ( 2020 06:02 )  PTT:30.4 sec  Urinalysis Basic - ( 2020 16:49 )    Color: Yellow / Appearance: Clear / S.015 / pH: x  Gluc: x / Ketone: Negative  / Bili: Negative / Urobili: 0.2 mg/dL   Blood: x / Protein: Trace mg/dL / Nitrite: Negative   Leuk Esterase: Negative / RBC: x / WBC 1-2 /HPF   Sq Epi: x / Non Sq Epi: Occasional /HPF / Bacteria: Few        MICROBIOLOGY    [] Pathology slides reviewed and/or discussed with pathologist  [] Microbiology findings discussed with microbiologist or slides reviewed  [] Images erviewed with radiologist  [] Case discussed with an attending physician in addition to the patient's primary physician  [] [place] [facility] [place]    [] Patient requires continued monitoring for:  [] Total critical care time spent by attending physician: __ minutes, excluding procedure time.

## 2020-11-27 NOTE — PROGRESS NOTE ADULT - ASSESSMENT
Pt is an 79 y/o female who presented to ER with a 2-3 day history of non productive cough and mild SOB. She denies CP, fever/chills, N/V/D, or additional complaints.   In ER pt found to be hypoxic at 88% on RA, which improved following application of 2L NC.   CXR pertinent for Right sided interstitial opacity. COVID +.    # Acute Hypoxic respiratory failure secondary to COVID-19 PNA  #Hx of COPD  -c.w IV abx Ceftriaxone and Azithro due to COPD  -one time decadron dose given in ED, continue with Dexamethasone 6mg IV daily   -ID consult , recs appreciated  -currently on 4L oc NC Spo2 93%, continue to monitor    # Mild cognitive impairment   -outpatient follow up    # CKD stage III (likely underlying HTN/DM II)  - monitor BMP daily     # HTN  -c/w home meds, Nifedipine 60mg qd    # DM II  -monitor FS  -Insuline sliding scale    FULL CODE      #Progress Note Handoff  Pending (specify):  Clinical improvement /PT  Family discussion: NA   Disposition: Home

## 2020-11-27 NOTE — PROGRESS NOTE ADULT - SUBJECTIVE AND OBJECTIVE BOX
SABRINA DOMINGUEZ  80y  Female      Patient is a 80y old  Female who presents with a chief complaint of Acute respiratory failure with hypoxia .      INTERVAL HPI/OVERNIGHT EVENTS: The patient was seen and examined at bedside.   Resting in bed.       ******************************* REVIEW OF SYSTEMS:*********************************************    All other review of systems negative    *********************** VITALS ******************************************    T(F): 95.9 (20 @ 12:33)  HR: 93 (20:33) (74 - 97)  BP: 139/65 (20 @ 12:33) (136/70 - 168/74)  RR: 18 (20 @ 12:33) (18 - 19)  SpO2: 94% (20 @ 12:33) (91% - 96%)    20 @ 07:  -  20 @ 07:00  --------------------------------------------------------  IN: 0 mL / OUT: 400 mL / NET: -400 mL            20 @ 07:  -  20 @ 07:00  --------------------------------------------------------  IN: 0 mL / OUT: 400 mL / NET: -400 mL        ******************************** PHYSICAL EXAM:**************************************************  GENERAL: NAD    PSYCH: no agitation, baseline mentation  HEENT:     NERVOUS SYSTEM:  Alert & Oriented X3  PULMONARY: JASBIR, CTA    CARDIOVASCULAR: S1S2 RRR    GI: Soft, NT, ND; BS present.    EXTREMITIES:  2+ Peripheral Pulses, No clubbing, cyanosis, or edema    LYMPH: No lymphadenopathy noted    SKIN: No rashes or lesions      **************************** LABS *******************************************************                          11.4   7.97  )-----------( 326      ( 2020 08:01 )             36.8         141  |  105  |  24<H>  ----------------------------<  146<H>  4.9   |  22  |  1.1    Ca    9.4      2020 08:01    TPro  7.1  /  Alb  3.6  /  TBili  <0.2  /  DBili  x   /  AST  35  /  ALT  22  /  AlkPhos  76        Urinalysis Basic - ( 2020 16:49 )    Color: Yellow / Appearance: Clear / S.015 / pH: x  Gluc: x / Ketone: Negative  / Bili: Negative / Urobili: 0.2 mg/dL   Blood: x / Protein: Trace mg/dL / Nitrite: Negative   Leuk Esterase: Negative / RBC: x / WBC 1-2 /HPF   Sq Epi: x / Non Sq Epi: Occasional /HPF / Bacteria: Few      PT/INR - ( 2020 06:02 )   PT: 10.30 sec;   INR: 0.90 ratio         PTT - ( 2020 06:02 )  PTT:30.4 sec  Lactate Trend        CAPILLARY BLOOD GLUCOSE      POCT Blood Glucose.: 249 mg/dL (2020 11:19)          **************************Active Medications *******************************************  No Known Allergies      albuterol/ipratropium for Nebulization 3 milliLiter(s) Nebulizer every 6 hours  aspirin  chewable 81 milliGRAM(s) Oral daily  atorvastatin 40 milliGRAM(s) Oral at bedtime  azithromycin  IVPB 500 milliGRAM(s) IV Intermittent every 24 hours  cefTRIAXone   IVPB 1000 milliGRAM(s) IV Intermittent every 24 hours  dexAMETHasone  Injectable 6 milliGRAM(s) IV Push daily  enoxaparin Injectable 40 milliGRAM(s) SubCutaneous daily  gabapentin 100 milliGRAM(s) Oral at bedtime  memantine 10 milliGRAM(s) Oral two times a day  NIFEdipine XL 60 milliGRAM(s) Oral daily  PARoxetine 30 milliGRAM(s) Oral daily      ***************************************************  RADIOLOGY & ADDITIONAL TESTS:    Imaging Personally Reviewed:  [ ] YES  [ ] NO    HEALTH ISSUES - PROBLEM Dx:  Pneumonia of right lung due to infectious organism, unspecified part of lung  Pneumonia of right lung due to infectious organism, unspecified part of lung    Chronic obstructive pulmonary disease, unspecified COPD type  Chronic obstructive pulmonary disease, unspecified COPD type    Hypoxia  Hypoxia    COVID-19  COVID-19

## 2020-11-27 NOTE — CONSULT NOTE ADULT - ATTENDING COMMENTS
I have personally examined the patient and reviewed the documentation above.  Corrections and edits were made wherever needed.     Please call with any questions or send a message on [employer] Teams  Spectra [phone]

## 2020-11-28 LAB
CRP SERPL-MCNC: 5.92 MG/DL — HIGH (ref 0–0.4)
GLUCOSE BLDC GLUCOMTR-MCNC: 139 MG/DL — HIGH (ref 70–99)
GLUCOSE BLDC GLUCOMTR-MCNC: 184 MG/DL — HIGH (ref 70–99)
PROCALCITONIN SERPL-MCNC: 0.1 NG/ML — SIGNIFICANT CHANGE UP (ref 0.02–0.1)

## 2020-11-28 PROCEDURE — 99233 SBSQ HOSP IP/OBS HIGH 50: CPT

## 2020-11-28 RX ADMIN — Medication 81 MILLIGRAM(S): at 11:15

## 2020-11-28 RX ADMIN — Medication 60 MILLIGRAM(S): at 05:24

## 2020-11-28 RX ADMIN — ATORVASTATIN CALCIUM 40 MILLIGRAM(S): 80 TABLET, FILM COATED ORAL at 21:23

## 2020-11-28 RX ADMIN — Medication 30 MILLIGRAM(S): at 11:16

## 2020-11-28 RX ADMIN — MEMANTINE HYDROCHLORIDE 10 MILLIGRAM(S): 10 TABLET ORAL at 05:24

## 2020-11-28 RX ADMIN — GABAPENTIN 100 MILLIGRAM(S): 400 CAPSULE ORAL at 21:23

## 2020-11-28 RX ADMIN — ENOXAPARIN SODIUM 40 MILLIGRAM(S): 100 INJECTION SUBCUTANEOUS at 11:15

## 2020-11-28 RX ADMIN — Medication 6 MILLIGRAM(S): at 05:24

## 2020-11-28 RX ADMIN — MEMANTINE HYDROCHLORIDE 10 MILLIGRAM(S): 10 TABLET ORAL at 17:28

## 2020-11-28 NOTE — PROGRESS NOTE ADULT - ASSESSMENT
Pt is an 79 y/o female who presented to ER with a 2-3 day history of non productive cough and mild SOB. She denies CP, fever/chills, N/V/D, or additional complaints.   In ER pt found to be hypoxic at 88% on RA, which improved following application of 2L NC.   CXR pertinent for Right sided interstitial opacity. COVID +.    # Acute respiratory failure secondary to COVID-19 PNA  #Hx of COPD  -d/c IV abx Ceftriaxone and Azithro  -one time decadron dose given in ED, continue with Dexamethasone 6mg IV daily   -ID consult done  -currently on 3L of NC, wane off as tolerated continue to monitor    # Mild cognitive impairment   -outpatient follow up    # CKD stage III (likely underlying HTN/DM II)  - monitor BMP daily     # HTN  -c/w home meds, Nifedipine 60mg qd    # DM II  -monitor FS  -Insuline sliding scale    FULL CODE     Pt is an 79 y/o female who presented to ER with a 2-3 day history of non productive cough and mild SOB. She denies CP, fever/chills, N/V/D, or additional complaints.   In ER pt found to be hypoxic at 88% on RA, which improved following application of 2L NC.   CXR pertinent for Right sided interstitial opacity. COVID +.    # Acute respiratory failure secondary to COVID-19 PNA  #Hx of COPD  -d/c IV abx Ceftriaxone and Azithro  -one time decadron dose given in ED, continue with Dexamethasone 6mg IV daily   -ID consult done  -currently on 4L of NC, wane off as tolerated continue to monitor    # Mild cognitive impairment   -outpatient follow up    # CKD stage III (likely underlying HTN/DM II)  - monitor BMP daily     # HTN  -c/w home meds, Nifedipine 60mg qd    # DM II  -monitor FS  -Insuline sliding scale    FULL CODE

## 2020-11-28 NOTE — PROGRESS NOTE ADULT - SUBJECTIVE AND OBJECTIVE BOX
SUBJECTIVE:    Patient is a 80y old Female who presents with a chief complaint of Acute respiratory failure with hypoxia (27 Nov 2020 08:58)    Currently admitted to medicine with the primary diagnosis of COVID-19       Today is hospital day 3d. This morning she is resting comfortably in bed and reports no new issues or overnight events.     INTERVAL EVENTS:     PAST MEDICAL & SURGICAL HISTORY  Depression    Dementia    Essential hypertension    High blood cholesterol    Diabetes mellitus, type 2    COPD (chronic obstructive pulmonary disease)    No significant past surgical history        ALLERGIES:  No Known Allergies    MEDICATIONS:  STANDING MEDICATIONS  albuterol/ipratropium for Nebulization 3 milliLiter(s) Nebulizer every 6 hours  aspirin  chewable 81 milliGRAM(s) Oral daily  atorvastatin 40 milliGRAM(s) Oral at bedtime  dexAMETHasone  Injectable 6 milliGRAM(s) IV Push daily  enoxaparin Injectable 40 milliGRAM(s) SubCutaneous daily  gabapentin 100 milliGRAM(s) Oral at bedtime  memantine 10 milliGRAM(s) Oral two times a day  NIFEdipine XL 60 milliGRAM(s) Oral daily  PARoxetine 30 milliGRAM(s) Oral daily    PRN MEDICATIONS    VITALS:   T(F): 98.8  HR: 78  BP: 157/90  RR: 17  SpO2: 90%    LABS:                        11.4   7.97  )-----------( 326      ( 27 Nov 2020 08:01 )             36.8     11-27    141  |  105  |  24<H>  ----------------------------<  146<H>  4.9   |  22  |  1.1    Ca    9.4      27 Nov 2020 08:01    TPro  7.1  /  Alb  3.6  /  TBili  <0.2  /  DBili  x   /  AST  35  /  ALT  22  /  AlkPhos  76  11-27              Culture - Urine (collected 25 Nov 2020 16:49)  Source: .Urine Clean Catch (Midstream)  Final Report (26 Nov 2020 22:23):    <10,000 CFU/mL Normal Urogenital Samira    Culture - Blood (collected 25 Nov 2020 16:24)  Source: .Blood Blood-Peripheral  Preliminary Report (26 Nov 2020 23:01):    No growth to date.    Culture - Blood (collected 25 Nov 2020 16:24)  Source: .Blood Blood-Peripheral  Preliminary Report (26 Nov 2020 23:01):    No growth to date.          RADIOLOGY:    PHYSICAL EXAM:  GEN: No acute distress  PULM/CHEST: Clear to auscultation bilaterally, no rales, rhonchi or wheezes   CVS: Regular rate and rhythm, S1-S2, no murmurs  ABD: Soft, non-tender, non-distended, +BS  EXT: No edema  NEURO: AAOx3    Shah Catheter:

## 2020-11-28 NOTE — PROGRESS NOTE ADULT - SUBJECTIVE AND OBJECTIVE BOX
ALBERTO SABRINA  80y  Female      Patient is a 80y old  Female who presents with a chief complaint of Acute respiratory failure with hypoxia.      INTERVAL HPI/OVERNIGHT EVENTS: The patient was seen and examined at bedside.   Resting in bed. Feeling better.       ******************************* REVIEW OF SYSTEMS:**********************************************    All other review of systems negative    *********************** VITALS ******************************************    T(F): 98 (11-28-20 @ 12:55)  HR: 81 (11-28-20 @ 12:55) (77 - 92)  BP: 138/86 (11-28-20 @ 12:55) (114/78 - 170/76)  RR: 18 (11-28-20 @ 12:55) (17 - 18)  SpO2: 91% (11-28-20 @ 12:55) (90% - 95%)            ******************************** PHYSICAL EXAM:**************************************************  GENERAL: NAD    PSYCH: no agitation, baseline mentation  HEENT:     NERVOUS SYSTEM:  Alert & Oriented X3,   PULMONARY: Decreased  JASBIR,    CARDIOVASCULAR: S1S2 RRR    GI: Soft, NT, ND; BS present.    EXTREMITIES:  2+ Peripheral Pulses, No clubbing, cyanosis, or edema    LYMPH: No lymphadenopathy noted    SKIN: No rashes or lesions      **************************** LABS *******************************************************                          11.4   7.97  )-----------( 326      ( 27 Nov 2020 08:01 )             36.8     11-27    141  |  105  |  24<H>  ----------------------------<  146<H>  4.9   |  22  |  1.1    Ca    9.4      27 Nov 2020 08:01    TPro  7.1  /  Alb  3.6  /  TBili  <0.2  /  DBili  x   /  AST  35  /  ALT  22  /  AlkPhos  76  11-27          Lactate Trend        CAPILLARY BLOOD GLUCOSE      POCT Blood Glucose.: 184 mg/dL (28 Nov 2020 11:31)          **************************Active Medications *******************************************  No Known Allergies      albuterol/ipratropium for Nebulization 3 milliLiter(s) Nebulizer every 6 hours  aspirin  chewable 81 milliGRAM(s) Oral daily  atorvastatin 40 milliGRAM(s) Oral at bedtime  dexAMETHasone  Injectable 6 milliGRAM(s) IV Push daily  enoxaparin Injectable 40 milliGRAM(s) SubCutaneous daily  gabapentin 100 milliGRAM(s) Oral at bedtime  memantine 10 milliGRAM(s) Oral two times a day  NIFEdipine XL 60 milliGRAM(s) Oral daily  PARoxetine 30 milliGRAM(s) Oral daily      ***************************************************  RADIOLOGY & ADDITIONAL TESTS:    Imaging Personally Reviewed:  [ ] YES  [ ] NO    HEALTH ISSUES - PROBLEM Dx:  Pneumonia of right lung due to infectious organism, unspecified part of lung  Pneumonia of right lung due to infectious organism, unspecified part of lung    Chronic obstructive pulmonary disease, unspecified COPD type  Chronic obstructive pulmonary disease, unspecified COPD type    Hypoxia  Hypoxia    COVID-19  COVID-19

## 2020-11-28 NOTE — PROGRESS NOTE ADULT - ASSESSMENT
Pt is an 81 y/o female who presented to ER with a 2-3 day history of non productive cough and mild SOB. She denies CP, fever/chills, N/V/D, or additional complaints.   In ER pt found to be hypoxic at 88% on RA, which improved following application of 2L NC.   CXR pertinent for Right sided interstitial opacity. COVID +.    # Acute Hypoxic respiratory failure secondary to COVID-19 PNA  #Hx of COPD  - s/p  IV abx Ceftriaxone and Azithro  -one time decadron dose given in ED, continue with Dexamethasone 6mg IV daily   -ID consult , recs appreciated  -currently on 3L NC Spo2 93%, continue to monitor    # Mild cognitive impairment   -outpatient follow up    # CKD stage III (likely underlying HTN/DM II)  - monitor BMP daily     # HTN  -c/w home meds, Nifedipine 60mg qd    # DM II  -monitor FS  -Insuline sliding scale    FULL CODE      #Progress Note Handoff  Pending (specify):  Clinical improvement /PT  Family discussion: NA   Disposition: Home

## 2020-11-29 LAB
GLUCOSE BLDC GLUCOMTR-MCNC: 119 MG/DL — HIGH (ref 70–99)
GLUCOSE BLDC GLUCOMTR-MCNC: 170 MG/DL — HIGH (ref 70–99)
GLUCOSE BLDC GLUCOMTR-MCNC: 177 MG/DL — HIGH (ref 70–99)
GLUCOSE BLDC GLUCOMTR-MCNC: 232 MG/DL — HIGH (ref 70–99)

## 2020-11-29 PROCEDURE — 99233 SBSQ HOSP IP/OBS HIGH 50: CPT

## 2020-11-29 RX ORDER — ZOLPIDEM TARTRATE 10 MG/1
5 TABLET ORAL AT BEDTIME
Refills: 0 | Status: DISCONTINUED | OUTPATIENT
Start: 2020-11-29 | End: 2020-12-01

## 2020-11-29 RX ADMIN — MEMANTINE HYDROCHLORIDE 10 MILLIGRAM(S): 10 TABLET ORAL at 18:02

## 2020-11-29 RX ADMIN — Medication 60 MILLIGRAM(S): at 05:23

## 2020-11-29 RX ADMIN — Medication 30 MILLIGRAM(S): at 12:01

## 2020-11-29 RX ADMIN — ATORVASTATIN CALCIUM 40 MILLIGRAM(S): 80 TABLET, FILM COATED ORAL at 21:04

## 2020-11-29 RX ADMIN — ENOXAPARIN SODIUM 40 MILLIGRAM(S): 100 INJECTION SUBCUTANEOUS at 12:01

## 2020-11-29 RX ADMIN — GABAPENTIN 100 MILLIGRAM(S): 400 CAPSULE ORAL at 21:04

## 2020-11-29 RX ADMIN — Medication 6 MILLIGRAM(S): at 05:22

## 2020-11-29 RX ADMIN — Medication 81 MILLIGRAM(S): at 12:01

## 2020-11-29 RX ADMIN — MEMANTINE HYDROCHLORIDE 10 MILLIGRAM(S): 10 TABLET ORAL at 05:22

## 2020-11-29 NOTE — PROGRESS NOTE ADULT - SUBJECTIVE AND OBJECTIVE BOX
ALBERTO SABRINA  80y  Female      Patient is a 80y old  Female who presents with a chief complaint of Acute respiratory failure with hypoxia.      INTERVAL HPI/OVERNIGHT EVENTS: The patient was seen and examined at bedside.  Resting in bed. C/O lack of sleep and generally weak..       ******************************* REVIEW OF SYSTEMS:**********************************************    All other review of systems negative    *********************** VITALS ******************************************    T(F): 98.3 (11-29-20 @ 10:00)  HR: 79 (11-29-20 @ 10:00) (78 - 83)  BP: 143/67 (11-29-20 @ 10:00) (138/86 - 173/81)  RR: 18 (11-29-20 @ 10:00) (17 - 19)  SpO2: 92% (11-29-20 @ 10:00) (90% - 93%)            ******************************** PHYSICAL EXAM:**************************************************  GENERAL: NAD    PSYCH: no agitation, baseline mentation  HEENT:     NERVOUS SYSTEM:  Alert & Oriented X3,   PULMONARY: Decreased    JASBIR,     CARDIOVASCULAR: S1S2 RRR    GI: Soft, NT, ND; BS present.    EXTREMITIES:  2+ Peripheral Pulses, No clubbing, cyanosis, or edema    LYMPH: No lymphadenopathy noted    SKIN: No rashes or lesions      **************************** LABS *******************************************************                  Lactate Trend        CAPILLARY BLOOD GLUCOSE      POCT Blood Glucose.: 119 mg/dL (29 Nov 2020 07:45)          **************************Active Medications *******************************************  No Known Allergies      albuterol/ipratropium for Nebulization 3 milliLiter(s) Nebulizer every 6 hours  aspirin  chewable 81 milliGRAM(s) Oral daily  atorvastatin 40 milliGRAM(s) Oral at bedtime  dexAMETHasone  Injectable 6 milliGRAM(s) IV Push daily  enoxaparin Injectable 40 milliGRAM(s) SubCutaneous daily  gabapentin 100 milliGRAM(s) Oral at bedtime  memantine 10 milliGRAM(s) Oral two times a day  NIFEdipine XL 60 milliGRAM(s) Oral daily  PARoxetine 30 milliGRAM(s) Oral daily      ***************************************************  RADIOLOGY & ADDITIONAL TESTS:    Imaging Personally Reviewed:  [ ] YES  [ ] NO    HEALTH ISSUES - PROBLEM Dx:  Pneumonia of right lung due to infectious organism, unspecified part of lung  Pneumonia of right lung due to infectious organism, unspecified part of lung    Chronic obstructive pulmonary disease, unspecified COPD type  Chronic obstructive pulmonary disease, unspecified COPD type    Hypoxia  Hypoxia    COVID-19  COVID-19

## 2020-11-29 NOTE — PROGRESS NOTE ADULT - ASSESSMENT
Pt is an 81 y/o female who presented to ER with a 2-3 day history of non productive cough and mild SOB. She denies CP, fever/chills, N/V/D, or additional complaints.   In ER pt found to be hypoxic at 88% on RA, which improved following application of 2L NC.   CXR pertinent for Right sided interstitial opacity. COVID +.    # Acute Hypoxic respiratory failure secondary to COVID-19 PNA  #Hx of COPD  - s/p  IV abx Ceftriaxone and Azithro  - continue with Dexamethasone 6mg IV daily start day 11/26  -ID consult , recs appreciated  -currently on 3L NC Spo2 93%, continue to monitor   Has home O2    # Mild cognitive impairment   -outpatient follow up    # CKD stage III (likely underlying HTN/DM II)  - monitor BMP daily     # HTN  -c/w home meds, Nifedipine 60mg qd    # DM II  -monitor FS  -Insuline sliding scale    # Functional decline probably due to COVID     Will get PT    FULL CODE          #Progress Note Handoff  Pending (specify):  Clinical stability  /PT  Family discussion: NA   Disposition: Home

## 2020-11-30 ENCOUNTER — TRANSCRIPTION ENCOUNTER (OUTPATIENT)
Age: 80
End: 2020-11-30

## 2020-11-30 VITALS
OXYGEN SATURATION: 95 % | SYSTOLIC BLOOD PRESSURE: 146 MMHG | DIASTOLIC BLOOD PRESSURE: 64 MMHG | TEMPERATURE: 99 F | RESPIRATION RATE: 18 BRPM | HEART RATE: 73 BPM

## 2020-11-30 LAB
CULTURE RESULTS: SIGNIFICANT CHANGE UP
CULTURE RESULTS: SIGNIFICANT CHANGE UP
GLUCOSE BLDC GLUCOMTR-MCNC: 120 MG/DL — HIGH (ref 70–99)
GLUCOSE BLDC GLUCOMTR-MCNC: 177 MG/DL — HIGH (ref 70–99)
GLUCOSE BLDC GLUCOMTR-MCNC: 177 MG/DL — HIGH (ref 70–99)
SPECIMEN SOURCE: SIGNIFICANT CHANGE UP
SPECIMEN SOURCE: SIGNIFICANT CHANGE UP

## 2020-11-30 PROCEDURE — 99233 SBSQ HOSP IP/OBS HIGH 50: CPT

## 2020-11-30 RX ADMIN — Medication 81 MILLIGRAM(S): at 13:38

## 2020-11-30 RX ADMIN — Medication 60 MILLIGRAM(S): at 05:45

## 2020-11-30 RX ADMIN — ENOXAPARIN SODIUM 40 MILLIGRAM(S): 100 INJECTION SUBCUTANEOUS at 13:39

## 2020-11-30 RX ADMIN — MEMANTINE HYDROCHLORIDE 10 MILLIGRAM(S): 10 TABLET ORAL at 05:45

## 2020-11-30 RX ADMIN — Medication 6 MILLIGRAM(S): at 05:45

## 2020-11-30 RX ADMIN — Medication 30 MILLIGRAM(S): at 13:40

## 2020-11-30 RX ADMIN — MEMANTINE HYDROCHLORIDE 10 MILLIGRAM(S): 10 TABLET ORAL at 17:20

## 2020-11-30 NOTE — PROGRESS NOTE ADULT - SUBJECTIVE AND OBJECTIVE BOX
Patient is a 80y old  Female who presents with a chief complaint of Acute respiratory failure with hypoxia (27 Nov 2020 08:58)      HPI:  Pt is an 79 y/o female who presented to ER with a 2-3 day history of non productive cough and mild SOB. She denies CP, fever/chills, N/V/D, or additional complaints.   In ER pt found to be hypoxic at 88% on RA, which improved following application of 2L NC.   CXR pertinent for Right sided interstitial opacity. COVID +.     (25 Nov 2020 18:06)      PAST MEDICAL & SURGICAL HISTORY:  Depression    Dementia    Essential hypertension    High blood cholesterol    Diabetes mellitus, type 2    COPD (chronic obstructive pulmonary disease)    No significant past surgical history        Home Medications:  albuterol:  (25 Nov 2020 18:13)  aspirin 81 mg oral tablet, chewable: 1 tab(s) orally once a day (25 Nov 2020 18:13)  Breo Ellipta 200 mcg-25 mcg/inh inhalation powder: 1 puff(s) inhaled once a day (25 Nov 2020 18:13)  docusate sodium 100 mg oral tablet: orally every other day (25 Nov 2020 18:13)  gabapentin 100 mg oral capsule: orally once a day (at bedtime) (25 Nov 2020 18:13)  memantine 10 mg oral tablet: 1 tab(s) orally 2 times a day (25 Nov 2020 18:13)  metFORMIN 500 mg oral tablet: 1 tab(s) orally 2 times a day (25 Nov 2020 18:13)  NIFEdipine 60 mg oral tablet, extended release: 1 tab(s) orally once a day (25 Nov 2020 18:13)  PARoxetine 30 mg oral tablet: 1 tab(s) orally once a day (25 Nov 2020 18:13)  rosuvastatin 10 mg oral tablet: 1 tab(s) orally once a day (25 Nov 2020 18:13)    Allergies:  No Known Allergies      FAMILY HISTORY:  No pertinent family history in first degree relatives      Hospital Medications:  albuterol/ipratropium for Nebulization 3 milliLiter(s) Nebulizer every 6 hours  aspirin  chewable 81 milliGRAM(s) Oral daily  atorvastatin 40 milliGRAM(s) Oral at bedtime  dexAMETHasone  Injectable 6 milliGRAM(s) IV Push daily  enoxaparin Injectable 40 milliGRAM(s) SubCutaneous daily  gabapentin 100 milliGRAM(s) Oral at bedtime  memantine 10 milliGRAM(s) Oral two times a day  NIFEdipine XL 60 milliGRAM(s) Oral daily  PARoxetine 30 milliGRAM(s) Oral daily  zolpidem 5 milliGRAM(s) Oral at bedtime PRN      Vital Signs Last 24 Hrs  T(C): 35.8 (30 Nov 2020 09:10), Max: 37.1 (29 Nov 2020 17:28)  T(F): 96.4 (30 Nov 2020 09:10), Max: 98.8 (29 Nov 2020 17:28)  HR: 70 (30 Nov 2020 09:10) (60 - 79)  BP: 122/59 (30 Nov 2020 09:10) (122/59 - 160/73)  BP(mean): --  RR: 18 (30 Nov 2020 09:10) (18 - 19)  SpO2: 92% (30 Nov 2020 09:10) (92% - 100%)    I&O's Summary    30 Nov 2020 07:01  -  30 Nov 2020 11:36  --------------------------------------------------------  IN: 200 mL / OUT: 0 mL / NET: 200 mL                PHYSICAL EXAM:  GENERAL: NAD, lying in bed comfortably  HEAD: Atraumatic, Normocephalic  EYES: EOMI, conjunctiva and sclera clear  ENT: Moist mucous membranes  NECK: Supple, No JVD  CHEST/LUNG: Wheezing heard BL, on NC  HEART: Regular rate and rhythm; No murmurs, rubs, or gallops  ABDOMEN: Bowel sounds present; Soft, Nontender, Nondistended. No hepatomegaly   EXTREMITIES: No clubbing, cyanosis, or edema  NERVOUS SYSTEM:  Alert & Oriented X3, speech clear. No deficits     IMAGES:  < from: Xray Chest 1 View-PORTABLE IMMEDIATE (Xray Chest 1 View-PORTABLE IMMEDIATE .) (11.25.20 @ 13:16) >  Impression:  Enlargement to the right hilum. Further evaluation with a PA and lateral view the chest is recommended  Right-sided interstitial opacities    < from: CT Angio Chest PE Protocol w/ IV Cont (11.25.20 @ 15:23) >  IMPRESSION:  1. Bilateral, peripheral groundglass airspace opacities, which may be attributed to viral pneumonia in the appropriate clinical setting.  2. No evidence of acute pulmonary embolism.  3. Nonspecific prominent right hilar lymph node, 1.4 cm short axis, which may be reactive.    < from: CT Head No Cont (11.25.20 @ 14:09) >  IMPRESSION:  No acute intracranial pathology.  Mild  chronic microvascular ischemic changes.

## 2020-11-30 NOTE — PROGRESS NOTE ADULT - SUBJECTIVE AND OBJECTIVE BOX
SABRINA DOMINGUEZ  80y, Female  Allergy: No Known Allergies      LOS  5d    CHIEF COMPLAINT: Acute respiratory failure with hypoxia (27 Nov 2020 08:58)      INTERVAL EVENTS/HPI  - on NC  - T(F): , Max: 98.8 (11-29-20 @ 17:28)        VITALS:  T(F): 98.7, Max: 98.8 (11-29-20 @ 17:28)  HR: 60  BP: 155/70  RR: 18Vital Signs Last 24 Hrs  T(C): 37.1 (30 Nov 2020 06:06), Max: 37.1 (29 Nov 2020 17:28)  T(F): 98.7 (30 Nov 2020 06:06), Max: 98.8 (29 Nov 2020 17:28)  HR: 60 (30 Nov 2020 06:06) (60 - 79)  BP: 155/70 (30 Nov 2020 06:06) (125/58 - 160/73)  BP(mean): --  RR: 18 (30 Nov 2020 06:06) (18 - 19)  SpO2: 100% (30 Nov 2020 06:06) (92% - 100%)    FH: Non-contributory  Social Hx: Non-contributory    TESTS & MEASUREMENTS:                  Culture - Urine (collected 11-25-20 @ 16:49)  Source: .Urine Clean Catch (Midstream)  Final Report (11-26-20 @ 22:23):    <10,000 CFU/mL Normal Urogenital Samira    Culture - Blood (collected 11-25-20 @ 16:24)  Source: .Blood Blood-Peripheral  Preliminary Report (11-26-20 @ 23:01):    No growth to date.    Culture - Blood (collected 11-25-20 @ 16:24)  Source: .Blood Blood-Peripheral  Preliminary Report (11-26-20 @ 23:01):    No growth to date.            INFECTIOUS DISEASES TESTING  Procalcitonin, Serum: 0.10 (11-27-20 @ 08:01)  Procalcitonin, Serum: 0.11 (11-26-20 @ 06:02)  Rapid RVP Result: Detected (11-25-20 @ 12:39)      INFLAMMATORY MARKERS  C-Reactive Protein, Serum: 5.92 mg/dL (11-27-20 @ 08:01)      RADIOLOGY & ADDITIONAL TESTS:  I have personally reviewed the last available Chest xray  CXR      CT      CARDIOLOGY TESTING  12 Lead ECG:   Ventricular Rate 80 BPM    Atrial Rate 80 BPM    P-R Interval 162 ms    QRS Duration 70 ms    Q-T Interval 366 ms    QTC Calculation(Bazett) 422 ms    P Axis 48 degrees    R Axis -17 degrees    T Axis 43 degrees    Diagnosis Line Normal sinus rhythm  Possible Inferior infarct , age undetermined  Abnormal ECG    Confirmed by DENIS ANAND MD ([id number]) on 11/26/2020 7:58:34 AM (11-25-20 @ 12:36)      MEDICATIONS  albuterol/ipratropium for Nebulization 3 Nebulizer every 6 hours  aspirin  chewable 81 Oral daily  atorvastatin 40 Oral at bedtime  dexAMETHasone  Injectable 6 IV Push daily  enoxaparin Injectable 40 SubCutaneous daily  gabapentin 100 Oral at bedtime  memantine 10 Oral two times a day  NIFEdipine XL 60 Oral daily  PARoxetine 30 Oral daily      WEIGHT  Weight (kg): 72.6 (11-25-20 @ 12:40)      ANTIBIOTICS:      All available historical records have been reviewed

## 2020-11-30 NOTE — PROGRESS NOTE ADULT - ASSESSMENT
Pt is an 81 y/o female who presented to ER with a 2-3 day history of non productive cough and mild SOB. She denies CP, fever/chills, N/V/D, or additional complaints.   In ER pt found to be hypoxic at 88% on RA, which improved following application of 2L NC.   CXR pertinent for Right sided interstitial opacity. COVID +.    # Acute Hypoxic respiratory failure secondary to COVID-19 PNA  #Hx of COPD  - s/p  IV abx Ceftriaxone and Azithro  - continue with Dexamethasone 6mg IV daily start day 11/26, continue for ten days   -ID consult , recs appreciated  - Currently on 3L , Spo2 93%    # Mild cognitive impairment   -outpatient follow up    # CKD stage III (likely underlying HTN/DM II)  - monitor BMP daily     # HTN  -c/w home meds, Nifedipine 60mg qd    # DM II  -monitor FS  -Insuline sliding scale    # Functional decline probably due to COVID   - evaluated by PT today     FULL CODE      Disposition: Home with home O2

## 2020-11-30 NOTE — PROGRESS NOTE ADULT - SUBJECTIVE AND OBJECTIVE BOX
I made rounds today with the treatment team including the hospitalist, residents,  nurses and  and discussed the patient's current medical status and discharge  planning needs, and reviewed the chart.    T(C): 35.8 (11-30-20 @ 09:10), Max: 37.1 (11-29-20 @ 17:28)  HR: 70 (11-30-20 @ 09:10) (60 - 79)  BP: 122/59 (11-30-20 @ 09:10) (122/59 - 160/73)  RR: 18 (11-30-20 @ 09:10) (18 - 19)  SpO2: 92% (11-30-20 @ 09:10) (92% - 100%)          I reached out to the patient's health care proxy/ responsible family member-           [  x   ]  I reached    Ines ( daughter ) [phone]        and discussed the patient's medical condition,                   family concerns, and discharge planning           [     ]  I left a message with family               [     ]  I personally participated in rounds with the medical team and my resident and discussed the case. My resident reached                   family member/ HCP                                under my direction and supervision  and we reviewed the conversation          [     ]  My resident left a message with family under my direction and supervision                [     ]   My resident attended rounds and called the family     The following was discussed: vitals / O2 saturation on 3L / current medical status / d/c home with services likely today          [     ] I spent 5-10 minutes on the above discussing medical issues with team members and family and/ or my resident    [     ] I spent 11-20 minutes on the above discussing medical issues with team members and family and/ or my resident    [ x    ] I spent 21-30 minutes on the above discussing medical issues with team members and family and/ or my resident

## 2020-11-30 NOTE — DISCHARGE NOTE NURSING/CASE MANAGEMENT/SOCIAL WORK - PATIENT PORTAL LINK FT
You can access the FollowMyHealth Patient Portal offered by [facility] by registering at the following website: [email or web address]. By joining [facility]’s FollowMyHealth portal, you will also be able to view your health information using other applications (apps) compatible with our system.

## 2020-11-30 NOTE — PHYSICAL THERAPY INITIAL EVALUATION ADULT - IMPAIRMENTS FOUND, PT EVAL
ergonomics and body mechanics/gait, locomotion, and balance/aerobic capacity/endurance/muscle strength/poor safety awareness

## 2020-11-30 NOTE — PHYSICAL THERAPY INITIAL EVALUATION ADULT - GENERAL OBSERVATIONS, REHAB EVAL
PT IE completed. 0930-1000am. Patient encountered semifowler in bed, in NAD, +IV lock, +O2 2L nc, agreeable to therapy. Patient needs Supervision supine<>sit, sit<>stand. Patient also was able to transfer and ambulate with or without an AD 15 feet x 2 with slight SOB. SpO2 PRE:97% POST:92%. ALEJANDRO Gant made aware.

## 2020-11-30 NOTE — PROGRESS NOTE ADULT - SUBJECTIVE AND OBJECTIVE BOX
SABRINA DOMINGUEZ  80y  Female      Patient is a 80y old  Female who presents with a chief complaint of COVID +      INTERVAL HPI/OVERNIGHT EVENTS: The patient was seen and examined at bedside.   Resting in bed. No acute distress.       ******************************* REVIEW OF SYSTEMS:**********************************************    All other review of systems negative    *********************** VITALS ******************************************    T(F): 96.4 (11-30-20 @ 09:10)  HR: 70 (11-30-20 @ 09:10) (60 - 79)  BP: 122/59 (11-30-20 @ 09:10) (122/59 - 160/73)  RR: 18 (11-30-20 @ 09:10) (18 - 19)  SpO2: 92% (11-30-20 @ 09:10) (92% - 100%)    11-30-20 @ 07:01  -  11-30-20 @ 12:57  --------------------------------------------------------  IN: 200 mL / OUT: 0 mL / NET: 200 mL            11-30-20 @ 07:01  -  11-30-20 @ 12:57  --------------------------------------------------------  IN: 200 mL / OUT: 0 mL / NET: 200 mL        ******************************** PHYSICAL EXAM:**************************************************  GENERAL: NAD    PSYCH: no agitation, baseline mentation  HEENT:     NERVOUS SYSTEM:  Alert & Oriented X3, MS  5/5 B/L  UE and LE ; Sensory intact    PULMONARY: Improving  JASBIR,    CARDIOVASCULAR: S1S2 RRR    GI: Soft, NT, ND; BS present.    EXTREMITIES:  2+ Peripheral Pulses, No clubbing, cyanosis, or edema    LYMPH: No lymphadenopathy noted    SKIN: No rashes or lesions      **************************** LABS *******************************************************                  Lactate Trend        CAPILLARY BLOOD GLUCOSE      POCT Blood Glucose.: 120 mg/dL (30 Nov 2020 07:47)          **************************Active Medications *******************************************  No Known Allergies      albuterol/ipratropium for Nebulization 3 milliLiter(s) Nebulizer every 6 hours  aspirin  chewable 81 milliGRAM(s) Oral daily  atorvastatin 40 milliGRAM(s) Oral at bedtime  dexAMETHasone  Injectable 6 milliGRAM(s) IV Push daily  enoxaparin Injectable 40 milliGRAM(s) SubCutaneous daily  gabapentin 100 milliGRAM(s) Oral at bedtime  memantine 10 milliGRAM(s) Oral two times a day  NIFEdipine XL 60 milliGRAM(s) Oral daily  PARoxetine 30 milliGRAM(s) Oral daily  zolpidem 5 milliGRAM(s) Oral at bedtime PRN      ***************************************************  RADIOLOGY & ADDITIONAL TESTS:    Imaging Personally Reviewed:  [ ] YES  [ ] NO    HEALTH ISSUES - PROBLEM Dx:  Pneumonia of right lung due to infectious organism, unspecified part of lung  Pneumonia of right lung due to infectious organism, unspecified part of lung    Chronic obstructive pulmonary disease, unspecified COPD type  Chronic obstructive pulmonary disease, unspecified COPD type    Hypoxia  Hypoxia    COVID-19  COVID-19

## 2020-11-30 NOTE — PROGRESS NOTE ADULT - ASSESSMENT
79 yo F with PMHx of COPD, Dementia, Depression, Diabetes mellitus type II, Essential hypertension, and hyperlipidemia who presented to the hospital for 2-3 days of non-productive cough and mild SOB. COVID+ on 11/25. ID consulted for acute hypoxic respiratory failure likely secondary to COVID-19 PNA.    IMPRESSION  #Severe COVID-19 PNA with hypoxemia requiring supplemental O2   < from: CT Angio Chest PE Protocol w/ IV Cont (11.25.20 @ 15:23) >  1. Bilateral, peripheral groundglass airspace opacities, which may be attributed to viral pneumonia in the appropriate clinical setting.  2. No evidence of acute pulmonary embolism.  #COPD    RECOMMEND  - Dexamethasone 6mg daily x at least 10 days or until discharge (whichever is longer)  - Was never started on RDV?  - A/C per primary team  - Prone positioning if possible     Please call with any questions or send a message on [employer] Teams  Spectra [phone]

## 2020-11-30 NOTE — PHYSICAL THERAPY INITIAL EVALUATION ADULT - GAIT TRAINING, PT EVAL
The patient will improve gait using  rolling walker Independent for 25 feet by discharge to decrease burden of care.

## 2020-11-30 NOTE — PHYSICAL THERAPY INITIAL EVALUATION ADULT - PERTINENT HX OF CURRENT PROBLEM, REHAB EVAL
79 y/o female who presented to ER with a 2-3 day history of non productive cough and mild SOB. +COVID 19.

## 2020-11-30 NOTE — DISCHARGE NOTE PROVIDER - HOSPITAL COURSE
HPI:  Pt is an 81 y/o female who presented to ER with a 2-3 day history of non productive cough and mild SOB. She denies CP, fever/chills, N/V/D, or additional complaints.   In ER pt found to be hypoxic at 88% on RA, which improved following application of 2L NC.   CXR pertinent for Right sided interstitial opacity. COVID +.    Hospital Course:     Patient admitted to the hospital for 5 days. She received IV abx Ceftriaxone and Azithro and dexamethasone IV 6mg daily. She will be dc on prednisone 40mg to complete a ten day course. Patient did not receive remdesivir. Patient had some functional decline during her hospital day and was evaluated by PT. She was given nifedipine for her HTN. She remained afebrile. Patient remained stable and is okay to be discharged home.

## 2020-11-30 NOTE — PHYSICAL THERAPY INITIAL EVALUATION ADULT - TRANSFER TRAINING, PT EVAL
The patient will improve transfers to Independent using rolling walker by discharge  to decrease burden of care.

## 2020-11-30 NOTE — DISCHARGE NOTE PROVIDER - CARE PROVIDER_API CALL
Vinicio Sena  Infectious Disease  [address] [place] [id number]  Phone: [phone]  Fax: [phone]  Established Patient  Follow Up Time: 1 month

## 2020-11-30 NOTE — DISCHARGE NOTE PROVIDER - NSDCCPCAREPLAN_GEN_ALL_CORE_FT
PRINCIPAL DISCHARGE DIAGNOSIS  Diagnosis: COVID-19  Assessment and Plan of Treatment: You were found to have COVID 19 infection. Symptoms usually start 4 or 5 days after a person is infected with the virus. But in some people, it can take up to 2 weeks for symptoms to appear. Some people never show symptoms at all.  When symptoms do happen, they can include:Fever, Cough, trouble breathing, Feeling tired, Shaking chills, Muscle aches, Headache, Sore throat, Problems with sense of smell or taste  You were treated in the hospital with steroids, antibiotics and O2 delivery therapy. Please continue using the nasal cannula to meet your body's oxygen requirements. Please stay in quarantine and practice social distancing.      SECONDARY DISCHARGE DIAGNOSES  Diagnosis: HTN (hypertension)  Assessment and Plan of Treatment: Hypertension  Hypertension, commonly called high blood pressure, is when the force of blood pumping through your arteries is too strong. Hypertension forces your heart to work harder to pump blood. Your arteries may become narrow or stiff. Having untreated or uncontrolled hypertension for a long period of time can cause heart attack, stroke, kidney disease, and other problems. If started on a medication, take exactly as prescribed by your health care professional. Maintain a healthy lifestyle and follow up with your primary care physician.  SEEK IMMEDIATE MEDICAL CARE IF YOU HAVE ANY OF THE FOLLOWING SYMPTOMS: severe headache, confusion, chest pain, abdominal pain, vomiting, or shortness of breath.

## 2020-11-30 NOTE — PROGRESS NOTE ADULT - ASSESSMENT
Pt is an 81 y/o female who presented to ER with a 2-3 day history of non productive cough and mild SOB. She denies CP, fever/chills, N/V/D, or additional complaints.   In ER pt found to be hypoxic at 88% on RA, which improved following application of 2L NC.   CXR pertinent for Right sided interstitial opacity. COVID +.    # Acute Hypoxic respiratory failure secondary to COVID-19 PNA  #Hx of COPD  - s/p  IV abx Ceftriaxone and Azithro  - continue with Dexamethasone 6mg IV daily start day 11/26, continue for ten days   -ID consult , recs appreciated  -currently on 3L NC. O2 on Room air at rest is 90%, on ambulation is 88%. O2 sat on ambulation on NC @ 3L/min improves to 94%. continue     # Mild cognitive impairment   -outpatient follow up    # CKD stage III (likely underlying HTN/DM II)  - monitor BMP daily     # HTN  -c/w home meds, Nifedipine 60mg qd    # DM II  -monitor FS  -Insuline sliding scale    # Functional decline probably due to COVID   - evaluated by PT today     FULL CODE      Disposition: Home with home O2      Letter of medical necessity:  Pt requires home oxygen due to covid pneumonia desaturating with ambulation IV ab have been tried along with steroids and have been unsuccessful pt is otherwise in chronic stable state.   Pt is aware and agreeable to home oxygen will require concentrator and portable oxygen to pt's home as she will travel home via ambulance

## 2020-12-03 DIAGNOSIS — E11.22 TYPE 2 DIABETES MELLITUS WITH DIABETIC CHRONIC KIDNEY DISEASE: ICD-10-CM

## 2020-12-03 DIAGNOSIS — J44.0 CHRONIC OBSTRUCTIVE PULMONARY DISEASE WITH (ACUTE) LOWER RESPIRATORY INFECTION: ICD-10-CM

## 2020-12-03 DIAGNOSIS — F32.9 MAJOR DEPRESSIVE DISORDER, SINGLE EPISODE, UNSPECIFIED: ICD-10-CM

## 2020-12-03 DIAGNOSIS — E78.5 HYPERLIPIDEMIA, UNSPECIFIED: ICD-10-CM

## 2020-12-03 DIAGNOSIS — R53.81 OTHER MALAISE: ICD-10-CM

## 2020-12-03 DIAGNOSIS — F03.90 UNSPECIFIED DEMENTIA WITHOUT BEHAVIORAL DISTURBANCE: ICD-10-CM

## 2020-12-03 DIAGNOSIS — J12.89 OTHER VIRAL PNEUMONIA: ICD-10-CM

## 2020-12-03 DIAGNOSIS — U07.1 COVID-19: ICD-10-CM

## 2020-12-03 DIAGNOSIS — G47.8 OTHER SLEEP DISORDERS: ICD-10-CM

## 2020-12-03 DIAGNOSIS — R06.02 SHORTNESS OF BREATH: ICD-10-CM

## 2020-12-03 DIAGNOSIS — Z79.84 LONG TERM (CURRENT) USE OF ORAL HYPOGLYCEMIC DRUGS: ICD-10-CM

## 2020-12-03 DIAGNOSIS — N18.30 CHRONIC KIDNEY DISEASE, STAGE 3 UNSPECIFIED: ICD-10-CM

## 2020-12-03 DIAGNOSIS — J96.01 ACUTE RESPIRATORY FAILURE WITH HYPOXIA: ICD-10-CM

## 2020-12-03 DIAGNOSIS — I12.9 HYPERTENSIVE CHRONIC KIDNEY DISEASE WITH STAGE 1 THROUGH STAGE 4 CHRONIC KIDNEY DISEASE, OR UNSPECIFIED CHRONIC KIDNEY DISEASE: ICD-10-CM

## 2022-04-20 NOTE — ED PROVIDER NOTE - PRINCIPAL DIAGNOSIS
Tracy Lassiter is an 74year COVID-19

## 2022-09-06 PROBLEM — Z00.00 ENCOUNTER FOR PREVENTIVE HEALTH EXAMINATION: Status: ACTIVE | Noted: 2022-09-06

## 2022-10-04 PROBLEM — F32.9 MAJOR DEPRESSIVE DISORDER, SINGLE EPISODE, UNSPECIFIED: Chronic | Status: ACTIVE | Noted: 2020-11-25

## 2022-10-04 PROBLEM — J44.9 CHRONIC OBSTRUCTIVE PULMONARY DISEASE, UNSPECIFIED: Chronic | Status: ACTIVE | Noted: 2020-11-25

## 2022-10-04 PROBLEM — I10 ESSENTIAL (PRIMARY) HYPERTENSION: Chronic | Status: ACTIVE | Noted: 2020-11-25

## 2022-10-04 PROBLEM — E78.00 PURE HYPERCHOLESTEROLEMIA, UNSPECIFIED: Chronic | Status: ACTIVE | Noted: 2020-11-25

## 2022-10-04 PROBLEM — F03.90 UNSPECIFIED DEMENTIA WITHOUT BEHAVIORAL DISTURBANCE: Chronic | Status: ACTIVE | Noted: 2020-11-25

## 2022-10-04 PROBLEM — F03.90 UNSPECIFIED DEMENTIA, UNSPECIFIED SEVERITY, WITHOUT BEHAVIORAL DISTURBANCE, PSYCHOTIC DISTURBANCE, MOOD DISTURBANCE, AND ANXIETY: Chronic | Status: ACTIVE | Noted: 2020-11-25

## 2022-10-04 PROBLEM — E11.9 TYPE 2 DIABETES MELLITUS WITHOUT COMPLICATIONS: Chronic | Status: ACTIVE | Noted: 2020-11-25

## 2022-11-01 ENCOUNTER — APPOINTMENT (OUTPATIENT)
Dept: NEUROLOGY | Facility: CLINIC | Age: 82
End: 2022-11-01

## 2022-11-16 NOTE — DISCHARGE NOTE PROVIDER - NSDCMRMEDTOKEN_GEN_ALL_CORE_FT
Yes-Patient/Caregiver accepts free interpretation services...
albuterol:   aspirin 81 mg oral tablet, chewable: 1 tab(s) orally once a day  Breo Ellipta 200 mcg-25 mcg/inh inhalation powder: 1 puff(s) inhaled once a day  docusate sodium 100 mg oral tablet: orally every other day  gabapentin 100 mg oral capsule: orally once a day (at bedtime)  memantine 10 mg oral tablet: 1 tab(s) orally 2 times a day  metFORMIN 500 mg oral tablet: 1 tab(s) orally 2 times a day  NIFEdipine 60 mg oral tablet, extended release: 1 tab(s) orally once a day  PARoxetine 30 mg oral tablet: 1 tab(s) orally once a day  predniSONE 20 mg oral tablet: 2 tab(s) orally once a day   rosuvastatin 10 mg oral tablet: 1 tab(s) orally once a day

## 2022-11-22 ENCOUNTER — APPOINTMENT (OUTPATIENT)
Dept: NEUROLOGY | Facility: CLINIC | Age: 82
End: 2022-11-22

## 2022-11-22 VITALS
HEART RATE: 94 BPM | WEIGHT: 180 LBS | BODY MASS INDEX: 29.99 KG/M2 | DIASTOLIC BLOOD PRESSURE: 74 MMHG | HEIGHT: 65 IN | SYSTOLIC BLOOD PRESSURE: 148 MMHG

## 2022-11-22 PROCEDURE — 99214 OFFICE O/P EST MOD 30 MIN: CPT

## 2022-11-22 NOTE — ASSESSMENT
[FreeTextEntry1] : 82 year old female with dementia.  We will increase her namenda 10 mg bid to XR 28 mg daily and f/u in 4-6 months for re evaluation.  They are aware if there are any issues she will contact the office.\par \par I personally reviewed with the PA, this patient's history and physical exam findings, as documented above. I have discussed the relevant areas of concern, having direct implications to the presenting problems and illnesses, and I have personally examined all pertinent and positive and negative findings, which impact on the prior neurological treatment. \par \par Mable Steen, MS, PA-C\par Chace Cesar, \par

## 2022-11-22 NOTE — PHYSICAL EXAM
[General Appearance - In No Acute Distress] : in no acute distress [General Appearance - Well Nourished] : well nourished [General Appearance - Well Developed] : well developed [General Appearance - Well-Appearing] : healthy appearing [Affect] : the affect was normal [Mood] : the mood was normal [Memory Remote] : remote memory was not impaired [Person] : oriented to person [Place] : oriented to place [Remote Intact] : remote memory intact [Cranial Nerves Optic (II)] : visual acuity intact bilaterally,  visual fields full to confrontation, pupils equal round and reactive to light [Cranial Nerves Oculomotor (III)] : extraocular motion intact [Cranial Nerves Facial (VII)] : face symmetrical [Motor Tone] : muscle tone was normal in all four extremities [Motor Strength] : muscle strength was normal in all four extremities [Involuntary Movements] : no involuntary movements were seen [FreeTextEntry1] : Constitutional: General: Well groomed, pleasant Mental Status: AAOx2+ . Able to recall president. 2/3 recall after 5 minutes Cranial Nerves: Extraocular movements intact. No facial asymmetry. Tongue protrudes midline Hearing grossly intact Motor: Able to move all four extremities independently Gait: ambulates with a walker.

## 2022-11-22 NOTE — HISTORY OF PRESENT ILLNESS
[FreeTextEntry1] : TODAY:  I had the pleasure of seeing Ms. Curran accompanied by her daughter today in follow up.  Her previous history and physical findings have been reviewed.\par \par She is under our care for dementia which is a chronic condition which she is receiving continuing active treatment for.  Her daughter states she has seen slight progression with respect to her memory since her last follow up 6 months ago.  She states she forgets names of things and where she puts things which sometimes makes her disoriented.  She denies any hallucinations and her daughter has not noticed any occurrence of this either.  She is currently managed on a regimen of Namenda 10 mg bid without side effects.  She eats and sleeps well and we will discuss an adjustment today.  Of note she has previously failed trials of Aricept.

## 2022-11-28 NOTE — PHYSICAL THERAPY INITIAL EVALUATION ADULT - SITTING BALANCE: STATIC
[EKG obtained to assist in diagnosis and management of assessed problem(s)] : EKG obtained to assist in diagnosis and management of assessed problem(s)
good balance

## 2023-04-17 ENCOUNTER — RX RENEWAL (OUTPATIENT)
Age: 83
End: 2023-04-17

## 2023-05-19 ENCOUNTER — EMERGENCY (EMERGENCY)
Facility: HOSPITAL | Age: 83
LOS: 0 days | Discharge: HOME | End: 2023-05-19
Attending: EMERGENCY MEDICINE
Payer: MEDICARE

## 2023-05-19 VITALS
DIASTOLIC BLOOD PRESSURE: 64 MMHG | SYSTOLIC BLOOD PRESSURE: 122 MMHG | RESPIRATION RATE: 20 BRPM | HEART RATE: 84 BPM | WEIGHT: 169.98 LBS | OXYGEN SATURATION: 98 % | TEMPERATURE: 97 F

## 2023-05-19 DIAGNOSIS — R10.9 UNSPECIFIED ABDOMINAL PAIN: ICD-10-CM

## 2023-05-19 DIAGNOSIS — Z79.82 LONG TERM (CURRENT) USE OF ASPIRIN: ICD-10-CM

## 2023-05-19 DIAGNOSIS — E78.5 HYPERLIPIDEMIA, UNSPECIFIED: ICD-10-CM

## 2023-05-19 DIAGNOSIS — J44.9 CHRONIC OBSTRUCTIVE PULMONARY DISEASE, UNSPECIFIED: ICD-10-CM

## 2023-05-19 DIAGNOSIS — R10.31 RIGHT LOWER QUADRANT PAIN: ICD-10-CM

## 2023-05-19 DIAGNOSIS — F03.90 UNSPECIFIED DEMENTIA, UNSPECIFIED SEVERITY, WITHOUT BEHAVIORAL DISTURBANCE, PSYCHOTIC DISTURBANCE, MOOD DISTURBANCE, AND ANXIETY: ICD-10-CM

## 2023-05-19 DIAGNOSIS — E11.9 TYPE 2 DIABETES MELLITUS WITHOUT COMPLICATIONS: ICD-10-CM

## 2023-05-19 DIAGNOSIS — I10 ESSENTIAL (PRIMARY) HYPERTENSION: ICD-10-CM

## 2023-05-19 DIAGNOSIS — Z79.84 LONG TERM (CURRENT) USE OF ORAL HYPOGLYCEMIC DRUGS: ICD-10-CM

## 2023-05-19 LAB
ALBUMIN SERPL ELPH-MCNC: 3.8 G/DL — SIGNIFICANT CHANGE UP (ref 3.5–5.2)
ALP SERPL-CCNC: 68 U/L — SIGNIFICANT CHANGE UP (ref 30–115)
ALT FLD-CCNC: 11 U/L — SIGNIFICANT CHANGE UP (ref 0–41)
ANION GAP SERPL CALC-SCNC: 12 MMOL/L — SIGNIFICANT CHANGE UP (ref 7–14)
APPEARANCE UR: CLEAR — SIGNIFICANT CHANGE UP
AST SERPL-CCNC: 12 U/L — SIGNIFICANT CHANGE UP (ref 0–41)
BASOPHILS # BLD AUTO: 0.05 K/UL — SIGNIFICANT CHANGE UP (ref 0–0.2)
BASOPHILS NFR BLD AUTO: 0.7 % — SIGNIFICANT CHANGE UP (ref 0–1)
BILIRUB DIRECT SERPL-MCNC: <0.2 MG/DL — SIGNIFICANT CHANGE UP (ref 0–0.3)
BILIRUB INDIRECT FLD-MCNC: SIGNIFICANT CHANGE UP MG/DL (ref 0.2–1.2)
BILIRUB SERPL-MCNC: <0.2 MG/DL — SIGNIFICANT CHANGE UP (ref 0.2–1.2)
BILIRUB UR-MCNC: NEGATIVE — SIGNIFICANT CHANGE UP
BUN SERPL-MCNC: 20 MG/DL — SIGNIFICANT CHANGE UP (ref 10–20)
CALCIUM SERPL-MCNC: 9.7 MG/DL — SIGNIFICANT CHANGE UP (ref 8.4–10.5)
CHLORIDE SERPL-SCNC: 107 MMOL/L — SIGNIFICANT CHANGE UP (ref 98–110)
CO2 SERPL-SCNC: 22 MMOL/L — SIGNIFICANT CHANGE UP (ref 17–32)
COLOR SPEC: YELLOW — SIGNIFICANT CHANGE UP
CREAT SERPL-MCNC: 1.1 MG/DL — SIGNIFICANT CHANGE UP (ref 0.7–1.5)
DIFF PNL FLD: NEGATIVE — SIGNIFICANT CHANGE UP
EGFR: 50 ML/MIN/1.73M2 — LOW
EOSINOPHIL # BLD AUTO: 0.09 K/UL — SIGNIFICANT CHANGE UP (ref 0–0.7)
EOSINOPHIL NFR BLD AUTO: 1.2 % — SIGNIFICANT CHANGE UP (ref 0–8)
GLUCOSE SERPL-MCNC: 102 MG/DL — HIGH (ref 70–99)
GLUCOSE UR QL: NEGATIVE MG/DL — SIGNIFICANT CHANGE UP
HCT VFR BLD CALC: 33.7 % — LOW (ref 37–47)
HGB BLD-MCNC: 10.7 G/DL — LOW (ref 12–16)
IMM GRANULOCYTES NFR BLD AUTO: 0.3 % — SIGNIFICANT CHANGE UP (ref 0.1–0.3)
KETONES UR-MCNC: NEGATIVE — SIGNIFICANT CHANGE UP
LEUKOCYTE ESTERASE UR-ACNC: NEGATIVE — SIGNIFICANT CHANGE UP
LIDOCAIN IGE QN: 40 U/L — SIGNIFICANT CHANGE UP (ref 7–60)
LYMPHOCYTES # BLD AUTO: 1.17 K/UL — LOW (ref 1.2–3.4)
LYMPHOCYTES # BLD AUTO: 15.6 % — LOW (ref 20.5–51.1)
MCHC RBC-ENTMCNC: 26.4 PG — LOW (ref 27–31)
MCHC RBC-ENTMCNC: 31.8 G/DL — LOW (ref 32–37)
MCV RBC AUTO: 83 FL — SIGNIFICANT CHANGE UP (ref 81–99)
MONOCYTES # BLD AUTO: 0.61 K/UL — HIGH (ref 0.1–0.6)
MONOCYTES NFR BLD AUTO: 8.1 % — SIGNIFICANT CHANGE UP (ref 1.7–9.3)
NEUTROPHILS # BLD AUTO: 5.56 K/UL — SIGNIFICANT CHANGE UP (ref 1.4–6.5)
NEUTROPHILS NFR BLD AUTO: 74.1 % — SIGNIFICANT CHANGE UP (ref 42.2–75.2)
NITRITE UR-MCNC: NEGATIVE — SIGNIFICANT CHANGE UP
NRBC # BLD: 0 /100 WBCS — SIGNIFICANT CHANGE UP (ref 0–0)
PH UR: 6.5 — SIGNIFICANT CHANGE UP (ref 5–8)
PLATELET # BLD AUTO: 338 K/UL — SIGNIFICANT CHANGE UP (ref 130–400)
PMV BLD: 9.7 FL — SIGNIFICANT CHANGE UP (ref 7.4–10.4)
POTASSIUM SERPL-MCNC: 4.3 MMOL/L — SIGNIFICANT CHANGE UP (ref 3.5–5)
POTASSIUM SERPL-SCNC: 4.3 MMOL/L — SIGNIFICANT CHANGE UP (ref 3.5–5)
PROT SERPL-MCNC: 6.3 G/DL — SIGNIFICANT CHANGE UP (ref 6–8)
PROT UR-MCNC: NEGATIVE MG/DL — SIGNIFICANT CHANGE UP
RBC # BLD: 4.06 M/UL — LOW (ref 4.2–5.4)
RBC # FLD: 16.6 % — HIGH (ref 11.5–14.5)
SODIUM SERPL-SCNC: 141 MMOL/L — SIGNIFICANT CHANGE UP (ref 135–146)
SP GR SPEC: 1.02 — SIGNIFICANT CHANGE UP (ref 1.01–1.03)
UROBILINOGEN FLD QL: 0.2 MG/DL — SIGNIFICANT CHANGE UP
WBC # BLD: 7.5 K/UL — SIGNIFICANT CHANGE UP (ref 4.8–10.8)
WBC # FLD AUTO: 7.5 K/UL — SIGNIFICANT CHANGE UP (ref 4.8–10.8)

## 2023-05-19 PROCEDURE — 99284 EMERGENCY DEPT VISIT MOD MDM: CPT

## 2023-05-19 PROCEDURE — 74177 CT ABD & PELVIS W/CONTRAST: CPT | Mod: MA

## 2023-05-19 PROCEDURE — 85025 COMPLETE CBC W/AUTO DIFF WBC: CPT

## 2023-05-19 PROCEDURE — 36415 COLL VENOUS BLD VENIPUNCTURE: CPT

## 2023-05-19 PROCEDURE — 74177 CT ABD & PELVIS W/CONTRAST: CPT | Mod: 26,MA

## 2023-05-19 PROCEDURE — 83690 ASSAY OF LIPASE: CPT

## 2023-05-19 PROCEDURE — 87086 URINE CULTURE/COLONY COUNT: CPT

## 2023-05-19 PROCEDURE — 99284 EMERGENCY DEPT VISIT MOD MDM: CPT | Mod: 25

## 2023-05-19 PROCEDURE — 80076 HEPATIC FUNCTION PANEL: CPT

## 2023-05-19 PROCEDURE — 81003 URINALYSIS AUTO W/O SCOPE: CPT

## 2023-05-19 PROCEDURE — 80048 BASIC METABOLIC PNL TOTAL CA: CPT

## 2023-05-19 RX ORDER — SODIUM CHLORIDE 9 MG/ML
1000 INJECTION INTRAMUSCULAR; INTRAVENOUS; SUBCUTANEOUS ONCE
Refills: 0 | Status: COMPLETED | OUTPATIENT
Start: 2023-05-19 | End: 2023-05-19

## 2023-05-19 RX ADMIN — SODIUM CHLORIDE 1000 MILLILITER(S): 9 INJECTION INTRAMUSCULAR; INTRAVENOUS; SUBCUTANEOUS at 15:50

## 2023-05-19 NOTE — ED PROVIDER NOTE - OBJECTIVE STATEMENT
82-year-old female with past medical history of hyperlipidemia diabetes dementia COPD and hypertension presents to the ED with right-sided abdominal pain since earlier today.  Patient also had diarrhea that resolved last week.  Denies fevers chills chest pain difficulty breathing nausea vomiting dysuria. Pt lives w daughter who is present at bedside.

## 2023-05-19 NOTE — ED PROVIDER NOTE - PATIENT PORTAL LINK FT
You can access the FollowMyHealth Patient Portal offered by [facility] by registering at the following website: [email or web address]. By joining [employer] [facility]’s FollowMyHealth portal, you will also be able to view your health information using other applications (apps) compatible with our system.

## 2023-05-19 NOTE — ED PROVIDER NOTE - CARE PROVIDER_API CALL
Riana Mackay (MD)  Gastroenterology  [address] [place] [address]  Phone: [phone]  Fax: [phone]  Follow Up Time: 1-3 Days

## 2023-05-19 NOTE — ED PROVIDER NOTE - ATTENDING CONTRIBUTION TO CARE
Patient here for right lower abdominal pain that she mentioned to her daughter earlier today that is now resolved.  No fever no vomiting or diarrhea.    Exam: Soft nondistended nontender abdomen, normal skin, cap refill less than 2 seconds, no acute distress  Plan: Labs, urinalysis, CT

## 2023-05-19 NOTE — ED PROVIDER NOTE - PHYSICAL EXAMINATION
VITAL SIGNS: I have reviewed nursing notes and confirm.  CONSTITUTIONAL: non-toxic, well appearing  SKIN: no rash, no petechiae.  EYES: pink conjunctiva, anicteric  ENT: tongue midline, no exudates, MMM  NECK: Supple; no meningismus, no JVD  CARD: RRR, no murmurs, equal radial pulses bilaterally 2+  RESP: CTAB, no respiratory distress  ABD: Soft, non-distended, no peritoneal signs, + mild right sided abd ttp, no rebound or guarding   NEURO: Alert, orientedx2 to name and location, not time, no focal deficits

## 2023-05-19 NOTE — ED ADULT TRIAGE NOTE - CHIEF COMPLAINT QUOTE
As per patient's daughter patient has dementia at baseline, and seems more confused today. Patient c/o pain to right side of abdomen

## 2023-05-20 LAB
CULTURE RESULTS: NO GROWTH — SIGNIFICANT CHANGE UP
SPECIMEN SOURCE: SIGNIFICANT CHANGE UP

## 2023-05-25 ENCOUNTER — RX RENEWAL (OUTPATIENT)
Age: 83
End: 2023-05-25

## 2023-05-25 ENCOUNTER — EMERGENCY (EMERGENCY)
Facility: HOSPITAL | Age: 83
LOS: 0 days | Discharge: HOME | End: 2023-05-25
Attending: EMERGENCY MEDICINE
Payer: MEDICARE

## 2023-05-25 VITALS
TEMPERATURE: 97 F | HEART RATE: 72 BPM | OXYGEN SATURATION: 99 % | WEIGHT: 169.98 LBS | RESPIRATION RATE: 18 BRPM | DIASTOLIC BLOOD PRESSURE: 76 MMHG | SYSTOLIC BLOOD PRESSURE: 190 MMHG

## 2023-05-25 VITALS
RESPIRATION RATE: 18 BRPM | HEART RATE: 82 BPM | OXYGEN SATURATION: 97 % | DIASTOLIC BLOOD PRESSURE: 78 MMHG | SYSTOLIC BLOOD PRESSURE: 175 MMHG
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
## 2023-05-25 DIAGNOSIS — Z04.3 ENCOUNTER FOR EXAMINATION AND OBSERVATION FOLLOWING OTHER ACCIDENT: ICD-10-CM

## 2023-05-25 DIAGNOSIS — E11.9 TYPE 2 DIABETES MELLITUS WITHOUT COMPLICATIONS: ICD-10-CM

## 2023-05-25 DIAGNOSIS — Z79.82 LONG TERM (CURRENT) USE OF ASPIRIN: ICD-10-CM

## 2023-05-25 DIAGNOSIS — F32.A DEPRESSION, UNSPECIFIED: ICD-10-CM

## 2023-05-25 DIAGNOSIS — J44.9 CHRONIC OBSTRUCTIVE PULMONARY DISEASE, UNSPECIFIED: ICD-10-CM

## 2023-05-25 DIAGNOSIS — Z79.84 LONG TERM (CURRENT) USE OF ORAL HYPOGLYCEMIC DRUGS: ICD-10-CM

## 2023-05-25 DIAGNOSIS — E78.00 PURE HYPERCHOLESTEROLEMIA, UNSPECIFIED: ICD-10-CM

## 2023-05-25 DIAGNOSIS — I10 ESSENTIAL (PRIMARY) HYPERTENSION: ICD-10-CM

## 2023-05-25 DIAGNOSIS — F03.90 UNSPECIFIED DEMENTIA WITHOUT BEHAVIORAL DISTURBANCE: ICD-10-CM

## 2023-05-25 LAB
ALBUMIN SERPL ELPH-MCNC: 4.2 G/DL — SIGNIFICANT CHANGE UP (ref 3.5–5.2)
ALP SERPL-CCNC: 76 U/L — SIGNIFICANT CHANGE UP (ref 30–115)
ALT FLD-CCNC: 14 U/L — SIGNIFICANT CHANGE UP (ref 0–41)
ANION GAP SERPL CALC-SCNC: 11 MMOL/L — SIGNIFICANT CHANGE UP (ref 7–14)
APTT BLD: 35.9 SEC — SIGNIFICANT CHANGE UP (ref 27–39.2)
AST SERPL-CCNC: 13 U/L — SIGNIFICANT CHANGE UP (ref 0–41)
BASOPHILS # BLD AUTO: 0.05 K/UL — SIGNIFICANT CHANGE UP (ref 0–0.2)
BASOPHILS NFR BLD AUTO: 0.7 % — SIGNIFICANT CHANGE UP (ref 0–1)
BILIRUB SERPL-MCNC: 0.3 MG/DL — SIGNIFICANT CHANGE UP (ref 0.2–1.2)
BUN SERPL-MCNC: 26 MG/DL — HIGH (ref 10–20)
CALCIUM SERPL-MCNC: 9.8 MG/DL — SIGNIFICANT CHANGE UP (ref 8.4–10.5)
CHLORIDE SERPL-SCNC: 108 MMOL/L — SIGNIFICANT CHANGE UP (ref 98–110)
CO2 SERPL-SCNC: 24 MMOL/L — SIGNIFICANT CHANGE UP (ref 17–32)
CREAT SERPL-MCNC: 1.3 MG/DL — SIGNIFICANT CHANGE UP (ref 0.7–1.5)
EGFR: 41 ML/MIN/1.73M2 — LOW
EOSINOPHIL # BLD AUTO: 0.13 K/UL — SIGNIFICANT CHANGE UP (ref 0–0.7)
EOSINOPHIL NFR BLD AUTO: 1.7 % — SIGNIFICANT CHANGE UP (ref 0–8)
GLUCOSE SERPL-MCNC: 129 MG/DL — HIGH (ref 70–99)
HCT VFR BLD CALC: 36 % — LOW (ref 37–47)
HGB BLD-MCNC: 11.3 G/DL — LOW (ref 12–16)
IMM GRANULOCYTES NFR BLD AUTO: 0.4 % — HIGH (ref 0.1–0.3)
INR BLD: 0.93 RATIO — SIGNIFICANT CHANGE UP (ref 0.65–1.3)
LIDOCAIN IGE QN: 46 U/L — SIGNIFICANT CHANGE UP (ref 7–60)
LYMPHOCYTES # BLD AUTO: 1.01 K/UL — LOW (ref 1.2–3.4)
LYMPHOCYTES # BLD AUTO: 13.2 % — LOW (ref 20.5–51.1)
MAGNESIUM SERPL-MCNC: 1.9 MG/DL — SIGNIFICANT CHANGE UP (ref 1.8–2.4)
MCHC RBC-ENTMCNC: 26.2 PG — LOW (ref 27–31)
MCHC RBC-ENTMCNC: 31.4 G/DL — LOW (ref 32–37)
MCV RBC AUTO: 83.3 FL — SIGNIFICANT CHANGE UP (ref 81–99)
MONOCYTES # BLD AUTO: 0.57 K/UL — SIGNIFICANT CHANGE UP (ref 0.1–0.6)
MONOCYTES NFR BLD AUTO: 7.5 % — SIGNIFICANT CHANGE UP (ref 1.7–9.3)
NEUTROPHILS # BLD AUTO: 5.86 K/UL — SIGNIFICANT CHANGE UP (ref 1.4–6.5)
NEUTROPHILS NFR BLD AUTO: 76.5 % — HIGH (ref 42.2–75.2)
NRBC # BLD: 0 /100 WBCS — SIGNIFICANT CHANGE UP (ref 0–0)
PLATELET # BLD AUTO: 368 K/UL — SIGNIFICANT CHANGE UP (ref 130–400)
PMV BLD: 9.9 FL — SIGNIFICANT CHANGE UP (ref 7.4–10.4)
POTASSIUM SERPL-MCNC: 4.3 MMOL/L — SIGNIFICANT CHANGE UP (ref 3.5–5)
POTASSIUM SERPL-SCNC: 4.3 MMOL/L — SIGNIFICANT CHANGE UP (ref 3.5–5)
PROT SERPL-MCNC: 7.2 G/DL — SIGNIFICANT CHANGE UP (ref 6–8)
PROTHROM AB SERPL-ACNC: 10.6 SEC — SIGNIFICANT CHANGE UP (ref 9.95–12.87)
RBC # BLD: 4.32 M/UL — SIGNIFICANT CHANGE UP (ref 4.2–5.4)
RBC # FLD: 17.1 % — HIGH (ref 11.5–14.5)
SODIUM SERPL-SCNC: 143 MMOL/L — SIGNIFICANT CHANGE UP (ref 135–146)
TROPONIN T SERPL-MCNC: <0.01 NG/ML — SIGNIFICANT CHANGE UP
WBC # BLD: 7.65 K/UL — SIGNIFICANT CHANGE UP (ref 4.8–10.8)
WBC # FLD AUTO: 7.65 K/UL — SIGNIFICANT CHANGE UP (ref 4.8–10.8)

## 2023-05-25 PROCEDURE — 99285 EMERGENCY DEPT VISIT HI MDM: CPT | Mod: 25

## 2023-05-25 PROCEDURE — 36415 COLL VENOUS BLD VENIPUNCTURE: CPT

## 2023-05-25 PROCEDURE — 85610 PROTHROMBIN TIME: CPT

## 2023-05-25 PROCEDURE — 99284 EMERGENCY DEPT VISIT MOD MDM: CPT

## 2023-05-25 PROCEDURE — 84484 ASSAY OF TROPONIN QUANT: CPT

## 2023-05-25 PROCEDURE — 83690 ASSAY OF LIPASE: CPT

## 2023-05-25 PROCEDURE — 85025 COMPLETE CBC W/AUTO DIFF WBC: CPT

## 2023-05-25 PROCEDURE — 80053 COMPREHEN METABOLIC PANEL: CPT

## 2023-05-25 PROCEDURE — 70450 CT HEAD/BRAIN W/O DYE: CPT | Mod: 26,MA

## 2023-05-25 PROCEDURE — 71045 X-RAY EXAM CHEST 1 VIEW: CPT | Mod: 26

## 2023-05-25 PROCEDURE — 85730 THROMBOPLASTIN TIME PARTIAL: CPT

## 2023-05-25 PROCEDURE — 70450 CT HEAD/BRAIN W/O DYE: CPT | Mod: MA

## 2023-05-25 PROCEDURE — 71045 X-RAY EXAM CHEST 1 VIEW: CPT

## 2023-05-25 PROCEDURE — 83735 ASSAY OF MAGNESIUM: CPT

## 2023-05-25 RX ORDER — SODIUM CHLORIDE 9 MG/ML
1000 INJECTION INTRAMUSCULAR; INTRAVENOUS; SUBCUTANEOUS ONCE
Refills: 0 | Status: COMPLETED | OUTPATIENT
Start: 2023-05-25 | End: 2023-05-25

## 2023-05-25 RX ADMIN — SODIUM CHLORIDE 2000 MILLILITER(S): 9 INJECTION INTRAMUSCULAR; INTRAVENOUS; SUBCUTANEOUS at 13:47

## 2023-05-25 NOTE — ED PROVIDER NOTE - ATTENDING APP SHARED VISIT CONTRIBUTION OF CARE
I have personally performed a history and physical exam on this patient and personally directed the management of the patient. Patient is an 82-year-old female history of dementia COPD hypertension presents after an unwitnessed fall found by the patient's granddaughter patient denies any complaints at this time however secondary to dementia is a poor historian history obtained from patient's daughter states that the patient is at baseline neuro on exam patient is normocephalic atraumatic no raccoon eyes ornelas sign septal hematoma no hemo-tympanum oropharynx clear chest clear to auscultation bilaterally abdomen soft nontender nondistended bowel sounds positive no CT or L-spine tenderness to palpation extremities full range of motion I can fully range her hips without pain no tenderness palpation of the lower extremities pedal pulse 2+ radial pulse 2+    Assessment plan we obtained CT head which is negative obtained chest x-ray per my independent evaluation not consistent with pneumothorax or pneumonia we obtained labs which appear within trend including troponin which is negative patient remains at baseline mental status calm cooperative at this time will discharge patient to the care of her daughter

## 2023-05-25 NOTE — ED PROVIDER NOTE - CLINICAL SUMMARY MEDICAL DECISION MAKING FREE TEXT BOX
we obtained CT head which is negative obtained chest x-ray per my independent evaluation not consistent with pneumothorax or pneumonia we obtained labs which appear within trend including troponin which is negative patient remains at baseline mental status calm cooperative at this time will discharge patient to the care of her daughter

## 2023-05-25 NOTE — ED ADULT TRIAGE NOTE - CHIEF COMPLAINT QUOTE
unwitnessed fall at home, pt has dementia, family states they found her on floor immediately after fall so don't believe she lost consciousness

## 2023-05-25 NOTE — ED PROVIDER NOTE - OBJECTIVE STATEMENT
82-year-old female with past medical history of hyperlipidemia diabetes dementia COPD and hypertension presents to the ED with daughter s/p unwitnessed fall this am. patient does not use an assistive devices to ambulate. patient had not eaten this am. patient denies any pain . no headache, neck pain . no vomiting or diarrhea. no rashes. patient without any recent change in medications.

## 2023-05-25 NOTE — ED PROVIDER NOTE - NSFOLLOWUPINSTRUCTIONS_ED_ALL_ED_FT
You were evaluated in the hospital because you fell. You feel likely due to a mechanical reason. During your hospital coure you did not have any arrthymias on EKG andCT imaging of your brain did not show any signs of fracture or acute bleeding. Physical therapy evaluated you and stated you would benefit from . Please follow up with your primary care doctor afterwards. Thank you.

## 2023-05-25 NOTE — ED PROVIDER NOTE - PATIENT PORTAL LINK FT
You can access the FollowMyHealth Patient Portal offered by [facility] by registering at the following website: [email or web address]. By joining [employer]’s FollowMyHealth portal, you will also be able to view your health information using other applications (apps) compatible with our system.

## 2023-05-25 NOTE — ED PROVIDER NOTE - NS ED ATTENDING STATEMENT MOD
This was a shared visit with the LEEANNE. I reviewed and verified the documentation and independently performed the documented:

## 2023-05-25 NOTE — ED PROVIDER NOTE - PHYSICAL EXAMINATION
Vital Signs: I have reviewed the initial vital signs.  Constitutional: well-nourished, no acute distress, normocephalic  Eyes: PERRLA, EOMI,  clear conjunctiva  ENT: MMM  Cardiovascular: regular rate, regular rhythm, no murmur appreciated  Respiratory: unlabored respiratory effort, clear to auscultation bilaterally, no chest wall tenderness  Gastrointestinal: soft, non-tender, non-distended  abdomen, no pulsatile mass  Musculoskeletal: supple neck, no cervical tenderness, no lower extremity edema, no bony tenderness  Integumentary: warm, dry, no rash  Neurologic: awake, alert, cranial nerves II-XII grossly intact, extremities’ motor and sensory functions grossly intact, no focal deficits

## 2023-05-25 NOTE — ED ADULT NURSE NOTE - NSFALLRISKINTERV_ED_ALL_ED
Assistance OOB with selected safe patient handling equipment if applicable/Assistance with ambulation/Communicate fall risk and risk factors to all staff, patient, and family/Monitor gait and stability/Monitor for mental status changes and reorient to person, place, and time, as needed/Provide patient with walking aids/Provide visual cue: yellow wristband, yellow gown, etc/Reinforce activity limits and safety measures with patient and family/Toileting schedule using arm’s reach rule for commode and bathroom/Use of alarms - bed, stretcher, chair and/or video monitoring/Call bell, personal items and telephone in reach/Instruct patient to call for assistance before getting out of bed/chair/stretcher/Non-slip footwear applied when patient is off stretcher/[place] to call system/Physically safe environment - no spills, clutter or unnecessary equipment/Purposeful Proactive Rounding/Room/bathroom lighting operational, light cord in reach

## 2023-06-20 ENCOUNTER — APPOINTMENT (OUTPATIENT)
Dept: NEUROLOGY | Facility: CLINIC | Age: 83
End: 2023-06-20

## 2023-06-21 ENCOUNTER — APPOINTMENT (OUTPATIENT)
Dept: NEUROLOGY | Facility: CLINIC | Age: 83
End: 2023-06-21
Payer: MEDICARE

## 2023-06-21 VITALS — BODY MASS INDEX: 31.89 KG/M2 | HEIGHT: 63 IN | WEIGHT: 180 LBS

## 2023-06-21 PROCEDURE — 99213 OFFICE O/P EST LOW 20 MIN: CPT

## 2023-06-21 NOTE — REVIEW OF SYSTEMS
[Confused or Disoriented] : confusion [Memory Lapses or Loss] : memory loss [Decr. Concentrating Ability] : decreased concentrating ability [Anxiety] : anxiety [Negative] : Musculoskeletal

## 2023-06-21 NOTE — ASSESSMENT
[FreeTextEntry1] : 82 year old female with dementia. She will continue on her current dosage of Namenda 28mg daily and return to our office for follow up  in 6 months. The medicaid forms dropped off will be completed and returned to the patients daughter next week. \par \par I have personally reviewed with the PA, this patient’s history and physical exam findings, as documented above. I have discussed the relevant areas of concern, having direct implications to the presenting problems and illnesses, and have personally examined all pertinent findings which impact on the prior neurological treatment. \par \par Delia Arteaga MS, PA-C\par Karthik Rutledge MD \par

## 2023-06-21 NOTE — PHYSICAL EXAM
[General Appearance - In No Acute Distress] : in no acute distress [General Appearance - Well Nourished] : well nourished [General Appearance - Well Developed] : well developed [General Appearance - Well-Appearing] : healthy appearing [Affect] : the affect was normal [Mood] : the mood was normal [Memory Remote] : remote memory was not impaired [Person] : oriented to person [Remote Intact] : remote memory intact [Cranial Nerves Optic (II)] : visual acuity intact bilaterally,  visual fields full to confrontation, pupils equal round and reactive to light [Cranial Nerves Oculomotor (III)] : extraocular motion intact [Cranial Nerves Facial (VII)] : face symmetrical [Motor Tone] : muscle tone was normal in all four extremities [Motor Strength] : muscle strength was normal in all four extremities [FreeTextEntry1] : Constitutional: General: Well groomed, pleasant Mental Status: AAOx2+ . Able to recall president. 2/3 recall after 5 minutes Cranial Nerves: Extraocular movements intact. No facial asymmetry. Tongue protrudes midline Hearing grossly intact Motor: Able to move all four extremities independently Gait: ambulates with a walker. [Place] : disoriented to place [Time] : disoriented to time [Concentration Intact] : a decrease in concentrating ability was observed [Comprehension] : comprehension not intact [Current Events] : inadequate knowledge of current events [Motor Strength Upper Extremities Bilaterally] : strength was normal in both upper extremities [Motor Strength Lower Extremities Bilaterally] : strength was normal in both lower extremities [Limited Balance] : balance was intact [Tremor] : no tremor present [Hearing Threshold Finger Rub Not Dimmit] : hearing was normal [Neck Appearance] : the appearance of the neck was normal [No Spinal Tenderness] : no spinal tenderness [Abnormal Walk] : normal gait [Involuntary Movements] : no involuntary movements were seen

## 2023-06-21 NOTE — HISTORY OF PRESENT ILLNESS
[FreeTextEntry1] : TODAY:  I had the pleasure of seeing Ms. Curran accompanied by her daughter today in follow up.  Her previous history and physical findings have been reviewed.\par \par She is under our care for dementia which is a chronic condition which she is receiving continuing active treatment for. She has previously failed trials of Aricept and has remained on a medication regimen of Namenda XR 28mg 1 capsule daily. Patients daughter denies side effects from the medication and states she sleeps well and eats 3 meals a day. Today she dropped off paper work applying for medicaid home services.

## 2023-10-13 ENCOUNTER — RX RENEWAL (OUTPATIENT)
Age: 83
End: 2023-10-13

## 2023-10-18 RX ORDER — GABAPENTIN 100 MG/1
100 CAPSULE ORAL AT BEDTIME
Qty: 14 | Refills: 0 | Status: ACTIVE | COMMUNITY
Start: 2023-10-18 | End: 1900-01-01

## 2023-12-26 ENCOUNTER — APPOINTMENT (OUTPATIENT)
Dept: NEUROLOGY | Facility: CLINIC | Age: 83
End: 2023-12-26

## 2024-01-01 ENCOUNTER — INPATIENT (INPATIENT)
Facility: HOSPITAL | Age: 84
LOS: 1 days | DRG: 871 | End: 2024-08-23
Attending: INTERNAL MEDICINE | Admitting: FAMILY MEDICINE
Payer: MEDICARE

## 2024-01-01 VITALS
TEMPERATURE: 97 F | RESPIRATION RATE: 20 BRPM | DIASTOLIC BLOOD PRESSURE: 38 MMHG | WEIGHT: 70.11 LBS | HEART RATE: 60 BPM | OXYGEN SATURATION: 100 % | SYSTOLIC BLOOD PRESSURE: 82 MMHG | HEIGHT: 63 IN

## 2024-01-01 VITALS — RESPIRATION RATE: 1 BRPM | HEART RATE: 28 BPM

## 2024-01-01 DIAGNOSIS — F03.C0 UNSPECIFIED DEMENTIA, SEVERE, WITHOUT BEHAVIORAL DISTURBANCE, PSYCHOTIC DISTURBANCE, MOOD DISTURBANCE, AND ANXIETY: ICD-10-CM

## 2024-01-01 DIAGNOSIS — N19 UNSPECIFIED KIDNEY FAILURE: ICD-10-CM

## 2024-01-01 DIAGNOSIS — A41.9 SEPSIS, UNSPECIFIED ORGANISM: ICD-10-CM

## 2024-01-01 DIAGNOSIS — Z51.5 ENCOUNTER FOR PALLIATIVE CARE: ICD-10-CM

## 2024-01-01 LAB
-  STAPHYLOCOCCUS EPIDERMIDIS, METHICILLIN RESISTANT: SIGNIFICANT CHANGE UP
A1C WITH ESTIMATED AVERAGE GLUCOSE RESULT: 5.6 % — SIGNIFICANT CHANGE UP (ref 4–5.6)
ABO RH CONFIRMATION: SIGNIFICANT CHANGE UP
ALBUMIN SERPL ELPH-MCNC: 2.4 G/DL — LOW (ref 3.5–5.2)
ALBUMIN SERPL ELPH-MCNC: 2.4 G/DL — LOW (ref 3.5–5.2)
ALBUMIN SERPL ELPH-MCNC: 3.3 G/DL — LOW (ref 3.5–5.2)
ALP SERPL-CCNC: 118 U/L — HIGH (ref 30–115)
ALP SERPL-CCNC: 80 U/L — SIGNIFICANT CHANGE UP (ref 30–115)
ALP SERPL-CCNC: 95 U/L — SIGNIFICANT CHANGE UP (ref 30–115)
ALT FLD-CCNC: 15 U/L — SIGNIFICANT CHANGE UP (ref 0–41)
ALT FLD-CCNC: 254 U/L — HIGH (ref 0–41)
ALT FLD-CCNC: 315 U/L — HIGH (ref 0–41)
ANION GAP SERPL CALC-SCNC: 33 MMOL/L — HIGH (ref 7–14)
ANION GAP SERPL CALC-SCNC: 34 MMOL/L — HIGH (ref 7–14)
ANION GAP SERPL CALC-SCNC: 36 MMOL/L — HIGH (ref 7–14)
ANION GAP SERPL CALC-SCNC: 40 MMOL/L — HIGH (ref 7–14)
ANION GAP SERPL CALC-SCNC: 44 MMOL/L — HIGH (ref 7–14)
ANION GAP SERPL CALC-SCNC: 58 MMOL/L — HIGH (ref 7–14)
ANION GAP SERPL CALC-SCNC: >34 MMOL/L — HIGH (ref 7–14)
APPEARANCE UR: ABNORMAL
APTT BLD: 139.8 SEC — CRITICAL HIGH (ref 27–39.2)
APTT BLD: 152.3 SEC — CRITICAL HIGH (ref 27–39.2)
APTT BLD: 175.4 SEC — CRITICAL HIGH (ref 27–39.2)
APTT BLD: 42.1 SEC — HIGH (ref 27–39.2)
APTT BLD: 43 SEC — HIGH (ref 27–39.2)
APTT BLD: SIGNIFICANT CHANGE UP SEC (ref 27–39.2)
AST SERPL-CCNC: 22 U/L — SIGNIFICANT CHANGE UP (ref 0–41)
AST SERPL-CCNC: 353 U/L — HIGH (ref 0–41)
AST SERPL-CCNC: 656 U/L — HIGH (ref 0–41)
B-OH-BUTYR SERPL-SCNC: 1.6 MMOL/L — HIGH
BACTERIA # UR AUTO: ABNORMAL /HPF
BASE EXCESS BLDA CALC-SCNC: -21.4 MMOL/L — LOW (ref -2–3)
BASE EXCESS BLDV CALC-SCNC: -23.5 MMOL/L — LOW (ref -2–3)
BASOPHILS # BLD AUTO: 0.03 K/UL — SIGNIFICANT CHANGE UP (ref 0–0.2)
BASOPHILS NFR BLD AUTO: 0.1 % — SIGNIFICANT CHANGE UP (ref 0–1)
BILIRUB DIRECT SERPL-MCNC: <0.2 MG/DL — SIGNIFICANT CHANGE UP (ref 0–0.3)
BILIRUB INDIRECT FLD-MCNC: >0 MG/DL — LOW (ref 0.2–1.2)
BILIRUB SERPL-MCNC: 0.2 MG/DL — SIGNIFICANT CHANGE UP (ref 0.2–1.2)
BILIRUB SERPL-MCNC: 0.2 MG/DL — SIGNIFICANT CHANGE UP (ref 0.2–1.2)
BILIRUB SERPL-MCNC: 0.3 MG/DL — SIGNIFICANT CHANGE UP (ref 0.2–1.2)
BILIRUB UR-MCNC: NEGATIVE — SIGNIFICANT CHANGE UP
BLD GP AB SCN SERPL QL: SIGNIFICANT CHANGE UP
BUN SERPL-MCNC: 138 MG/DL — CRITICAL HIGH (ref 10–20)
BUN SERPL-MCNC: 148 MG/DL — CRITICAL HIGH (ref 10–20)
BUN SERPL-MCNC: 169 MG/DL — CRITICAL HIGH (ref 10–20)
BUN SERPL-MCNC: 35 MG/DL — HIGH (ref 10–20)
BUN SERPL-MCNC: 52 MG/DL — HIGH (ref 10–20)
BUN SERPL-MCNC: 56 MG/DL — HIGH (ref 10–20)
BUN SERPL-MCNC: 73 MG/DL — CRITICAL HIGH (ref 10–20)
BUN SERPL-MCNC: 94 MG/DL — CRITICAL HIGH (ref 10–20)
BURR CELLS BLD QL SMEAR: PRESENT — SIGNIFICANT CHANGE UP
CA-I SERPL-SCNC: 1.26 MMOL/L — SIGNIFICANT CHANGE UP (ref 1.15–1.33)
CA-I SERPL-SCNC: 1.36 MMOL/L — HIGH (ref 1.15–1.33)
CALCIUM SERPL-MCNC: 10.3 MG/DL — SIGNIFICANT CHANGE UP (ref 8.4–10.5)
CALCIUM SERPL-MCNC: 7.5 MG/DL — LOW (ref 8.4–10.5)
CALCIUM SERPL-MCNC: 7.7 MG/DL — LOW (ref 8.4–10.5)
CALCIUM SERPL-MCNC: 8 MG/DL — LOW (ref 8.4–10.5)
CALCIUM SERPL-MCNC: 8.1 MG/DL — LOW (ref 8.4–10.5)
CALCIUM SERPL-MCNC: 9 MG/DL — SIGNIFICANT CHANGE UP (ref 8.4–10.5)
CALCIUM SERPL-MCNC: 9.2 MG/DL — SIGNIFICANT CHANGE UP (ref 8.4–10.5)
CALCIUM SERPL-MCNC: 9.9 MG/DL — SIGNIFICANT CHANGE UP (ref 8.4–10.5)
CHLORIDE SERPL-SCNC: 100 MMOL/L — SIGNIFICANT CHANGE UP (ref 98–110)
CHLORIDE SERPL-SCNC: 101 MMOL/L — SIGNIFICANT CHANGE UP (ref 98–110)
CHLORIDE SERPL-SCNC: 102 MMOL/L — SIGNIFICANT CHANGE UP (ref 98–110)
CHLORIDE SERPL-SCNC: 115 MMOL/L — HIGH (ref 98–110)
CHLORIDE SERPL-SCNC: 93 MMOL/L — LOW (ref 98–110)
CHLORIDE SERPL-SCNC: 96 MMOL/L — LOW (ref 98–110)
CHLORIDE SERPL-SCNC: 96 MMOL/L — LOW (ref 98–110)
CHLORIDE SERPL-SCNC: 98 MMOL/L — SIGNIFICANT CHANGE UP (ref 98–110)
CHOLEST SERPL-MCNC: 82 MG/DL — SIGNIFICANT CHANGE UP
CO2 SERPL-SCNC: 11 MMOL/L — LOW (ref 17–32)
CO2 SERPL-SCNC: 12 MMOL/L — LOW (ref 17–32)
CO2 SERPL-SCNC: 13 MMOL/L — LOW (ref 17–32)
CO2 SERPL-SCNC: 14 MMOL/L — LOW (ref 17–32)
CO2 SERPL-SCNC: 15 MMOL/L — LOW (ref 17–32)
CO2 SERPL-SCNC: 5 MMOL/L — CRITICAL LOW (ref 17–32)
CO2 SERPL-SCNC: 5 MMOL/L — CRITICAL LOW (ref 17–32)
CO2 SERPL-SCNC: <2 MMOL/L — CRITICAL LOW (ref 17–32)
COLOR SPEC: SIGNIFICANT CHANGE UP
CREAT SERPL-MCNC: 1.5 MG/DL — SIGNIFICANT CHANGE UP (ref 0.7–1.5)
CREAT SERPL-MCNC: 2.1 MG/DL — HIGH (ref 0.7–1.5)
CREAT SERPL-MCNC: 2.2 MG/DL — HIGH (ref 0.7–1.5)
CREAT SERPL-MCNC: 2.8 MG/DL — HIGH (ref 0.7–1.5)
CREAT SERPL-MCNC: 3.3 MG/DL — HIGH (ref 0.7–1.5)
CREAT SERPL-MCNC: 4.7 MG/DL — CRITICAL HIGH (ref 0.7–1.5)
CREAT SERPL-MCNC: 5.5 MG/DL — CRITICAL HIGH (ref 0.7–1.5)
CREAT SERPL-MCNC: 6.5 MG/DL — CRITICAL HIGH (ref 0.7–1.5)
DACRYOCYTES BLD QL SMEAR: SLIGHT — SIGNIFICANT CHANGE UP
DIFF PNL FLD: ABNORMAL
EGFR: 13 ML/MIN/1.73M2 — LOW
EGFR: 16 ML/MIN/1.73M2 — LOW
EGFR: 21 ML/MIN/1.73M2 — LOW
EGFR: 23 ML/MIN/1.73M2 — LOW
EGFR: 34 ML/MIN/1.73M2 — LOW
EGFR: 6 ML/MIN/1.73M2 — LOW
EGFR: 7 ML/MIN/1.73M2 — LOW
EGFR: 9 ML/MIN/1.73M2 — LOW
EOSINOPHIL # BLD AUTO: 0 K/UL — SIGNIFICANT CHANGE UP (ref 0–0.7)
EOSINOPHIL NFR BLD AUTO: 0 % — SIGNIFICANT CHANGE UP (ref 0–8)
EPI CELLS # UR: SIGNIFICANT CHANGE UP
ESTIMATED AVERAGE GLUCOSE: 114 MG/DL — SIGNIFICANT CHANGE UP (ref 68–114)
FLUAV AG NPH QL: SIGNIFICANT CHANGE UP
FLUBV AG NPH QL: SIGNIFICANT CHANGE UP
GAS PNL BLDA: SIGNIFICANT CHANGE UP
GAS PNL BLDV: 140 MMOL/L — SIGNIFICANT CHANGE UP (ref 136–145)
GAS PNL BLDV: 145 MMOL/L — SIGNIFICANT CHANGE UP (ref 136–145)
GAS PNL BLDV: SIGNIFICANT CHANGE UP
GAS PNL BLDV: SIGNIFICANT CHANGE UP
GIANT PLATELETS BLD QL SMEAR: PRESENT — SIGNIFICANT CHANGE UP
GLUCOSE BLDC GLUCOMTR-MCNC: 103 MG/DL — HIGH (ref 70–99)
GLUCOSE BLDC GLUCOMTR-MCNC: 108 MG/DL — HIGH (ref 70–99)
GLUCOSE BLDC GLUCOMTR-MCNC: 125 MG/DL — HIGH (ref 70–99)
GLUCOSE BLDC GLUCOMTR-MCNC: 138 MG/DL — HIGH (ref 70–99)
GLUCOSE BLDC GLUCOMTR-MCNC: 147 MG/DL — HIGH (ref 70–99)
GLUCOSE BLDC GLUCOMTR-MCNC: 206 MG/DL — HIGH (ref 70–99)
GLUCOSE BLDC GLUCOMTR-MCNC: 223 MG/DL — HIGH (ref 70–99)
GLUCOSE BLDC GLUCOMTR-MCNC: 256 MG/DL — HIGH (ref 70–99)
GLUCOSE BLDC GLUCOMTR-MCNC: 308 MG/DL — HIGH (ref 70–99)
GLUCOSE BLDC GLUCOMTR-MCNC: 513 MG/DL — CRITICAL HIGH (ref 70–99)
GLUCOSE BLDC GLUCOMTR-MCNC: 594 MG/DL — CRITICAL HIGH (ref 70–99)
GLUCOSE BLDC GLUCOMTR-MCNC: 66 MG/DL — LOW (ref 70–99)
GLUCOSE BLDC GLUCOMTR-MCNC: 79 MG/DL — SIGNIFICANT CHANGE UP (ref 70–99)
GLUCOSE BLDC GLUCOMTR-MCNC: 80 MG/DL — SIGNIFICANT CHANGE UP (ref 70–99)
GLUCOSE BLDC GLUCOMTR-MCNC: 80 MG/DL — SIGNIFICANT CHANGE UP (ref 70–99)
GLUCOSE BLDC GLUCOMTR-MCNC: 84 MG/DL — SIGNIFICANT CHANGE UP (ref 70–99)
GLUCOSE BLDC GLUCOMTR-MCNC: 85 MG/DL — SIGNIFICANT CHANGE UP (ref 70–99)
GLUCOSE BLDC GLUCOMTR-MCNC: 88 MG/DL — SIGNIFICANT CHANGE UP (ref 70–99)
GLUCOSE BLDC GLUCOMTR-MCNC: 90 MG/DL — SIGNIFICANT CHANGE UP (ref 70–99)
GLUCOSE BLDC GLUCOMTR-MCNC: 95 MG/DL — SIGNIFICANT CHANGE UP (ref 70–99)
GLUCOSE BLDC GLUCOMTR-MCNC: 98 MG/DL — SIGNIFICANT CHANGE UP (ref 70–99)
GLUCOSE BLDC GLUCOMTR-MCNC: >600 MG/DL — CRITICAL HIGH (ref 70–99)
GLUCOSE SERPL-MCNC: 111 MG/DL — HIGH (ref 70–99)
GLUCOSE SERPL-MCNC: 125 MG/DL — HIGH (ref 70–99)
GLUCOSE SERPL-MCNC: 230 MG/DL — HIGH (ref 70–99)
GLUCOSE SERPL-MCNC: 69 MG/DL — LOW (ref 70–99)
GLUCOSE SERPL-MCNC: 70 MG/DL — SIGNIFICANT CHANGE UP (ref 70–99)
GLUCOSE SERPL-MCNC: 79 MG/DL — SIGNIFICANT CHANGE UP (ref 70–99)
GLUCOSE SERPL-MCNC: 87 MG/DL — SIGNIFICANT CHANGE UP (ref 70–99)
GLUCOSE SERPL-MCNC: SIGNIFICANT CHANGE UP MG/DL (ref 70–99)
GLUCOSE UR QL: NEGATIVE MG/DL — SIGNIFICANT CHANGE UP
GRAM STN FLD: ABNORMAL
HCO3 BLDA-SCNC: 6 MMOL/L — CRITICAL LOW (ref 21–28)
HCO3 BLDV-SCNC: 7 MMOL/L — CRITICAL LOW (ref 22–29)
HCT VFR BLD CALC: 23.8 % — LOW (ref 37–47)
HCT VFR BLD CALC: 28.8 % — LOW (ref 37–47)
HCT VFR BLD CALC: 28.9 % — LOW (ref 37–47)
HCT VFR BLD CALC: 29.2 % — LOW (ref 37–47)
HCT VFR BLD CALC: 30.4 % — LOW (ref 37–47)
HCT VFR BLD CALC: 30.6 % — LOW (ref 37–47)
HCT VFR BLD CALC: 30.7 % — LOW (ref 37–47)
HCT VFR BLD CALC: 31.2 % — LOW (ref 37–47)
HCT VFR BLD CALC: 32.8 % — LOW (ref 37–47)
HCT VFR BLDA CALC: 20 % — CRITICAL LOW (ref 34.5–46.5)
HCT VFR BLDA CALC: 42 % — SIGNIFICANT CHANGE UP (ref 34.5–46.5)
HDLC SERPL-MCNC: 39 MG/DL — LOW
HGB BLD CALC-MCNC: 14 G/DL — SIGNIFICANT CHANGE UP (ref 11.7–16.1)
HGB BLD CALC-MCNC: 6.7 G/DL — CRITICAL LOW (ref 11.7–16.1)
HGB BLD-MCNC: 10 G/DL — LOW (ref 12–16)
HGB BLD-MCNC: 6.3 G/DL — CRITICAL LOW (ref 12–16)
HGB BLD-MCNC: 8.3 G/DL — LOW (ref 12–16)
HGB BLD-MCNC: 8.4 G/DL — LOW (ref 12–16)
HGB BLD-MCNC: 8.7 G/DL — LOW (ref 12–16)
HGB BLD-MCNC: 8.9 G/DL — LOW (ref 12–16)
HGB BLD-MCNC: 9.2 G/DL — LOW (ref 12–16)
HGB BLD-MCNC: 9.3 G/DL — LOW (ref 12–16)
HGB BLD-MCNC: 9.5 G/DL — LOW (ref 12–16)
HYPOCHROMIA BLD QL: SLIGHT — SIGNIFICANT CHANGE UP
IMM GRANULOCYTES NFR BLD AUTO: 1.4 % — HIGH (ref 0.1–0.3)
INR BLD: 1.35 RATIO — HIGH (ref 0.65–1.3)
INR BLD: 1.61 RATIO — HIGH (ref 0.65–1.3)
KETONES UR-MCNC: NEGATIVE MG/DL — SIGNIFICANT CHANGE UP
LACTATE BLDV-MCNC: 13.6 MMOL/L — CRITICAL HIGH (ref 0.5–2)
LACTATE BLDV-MCNC: >16 MMOL/L — CRITICAL HIGH (ref 0.5–2)
LEUKOCYTE ESTERASE UR-ACNC: ABNORMAL
LIPID PNL WITH DIRECT LDL SERPL: 27 MG/DL — SIGNIFICANT CHANGE UP
LYMPHOCYTES # BLD AUTO: 1.82 K/UL — SIGNIFICANT CHANGE UP (ref 1.2–3.4)
LYMPHOCYTES # BLD AUTO: 8.4 % — LOW (ref 20.5–51.1)
MACROCYTES BLD QL: SLIGHT — SIGNIFICANT CHANGE UP
MAGNESIUM SERPL-MCNC: 1.6 MG/DL — LOW (ref 1.8–2.4)
MAGNESIUM SERPL-MCNC: 1.6 MG/DL — LOW (ref 1.8–2.4)
MAGNESIUM SERPL-MCNC: 1.8 MG/DL — SIGNIFICANT CHANGE UP (ref 1.8–2.4)
MAGNESIUM SERPL-MCNC: 2 MG/DL — SIGNIFICANT CHANGE UP (ref 1.8–2.4)
MAGNESIUM SERPL-MCNC: 2.2 MG/DL — SIGNIFICANT CHANGE UP (ref 1.8–2.4)
MAGNESIUM SERPL-MCNC: 2.6 MG/DL — HIGH (ref 1.8–2.4)
MCHC RBC-ENTMCNC: 24.6 PG — LOW (ref 27–31)
MCHC RBC-ENTMCNC: 24.7 PG — LOW (ref 27–31)
MCHC RBC-ENTMCNC: 25.9 PG — LOW (ref 27–31)
MCHC RBC-ENTMCNC: 26 PG — LOW (ref 27–31)
MCHC RBC-ENTMCNC: 26.1 PG — LOW (ref 27–31)
MCHC RBC-ENTMCNC: 26.1 PG — LOW (ref 27–31)
MCHC RBC-ENTMCNC: 26.3 PG — LOW (ref 27–31)
MCHC RBC-ENTMCNC: 26.5 G/DL — LOW (ref 32–37)
MCHC RBC-ENTMCNC: 26.5 PG — LOW (ref 27–31)
MCHC RBC-ENTMCNC: 26.7 PG — LOW (ref 27–31)
MCHC RBC-ENTMCNC: 27.1 G/DL — LOW (ref 32–37)
MCHC RBC-ENTMCNC: 29.2 G/DL — LOW (ref 32–37)
MCHC RBC-ENTMCNC: 29.8 G/DL — LOW (ref 32–37)
MCHC RBC-ENTMCNC: 30.1 G/DL — LOW (ref 32–37)
MCHC RBC-ENTMCNC: 30.3 G/DL — LOW (ref 32–37)
MCHC RBC-ENTMCNC: 30.5 G/DL — LOW (ref 32–37)
MCHC RBC-ENTMCNC: 30.5 G/DL — LOW (ref 32–37)
MCHC RBC-ENTMCNC: 30.9 G/DL — LOW (ref 32–37)
MCV RBC AUTO: 85.6 FL — SIGNIFICANT CHANGE UP (ref 81–99)
MCV RBC AUTO: 86.1 FL — SIGNIFICANT CHANGE UP (ref 81–99)
MCV RBC AUTO: 86.2 FL — SIGNIFICANT CHANGE UP (ref 81–99)
MCV RBC AUTO: 86.5 FL — SIGNIFICANT CHANGE UP (ref 81–99)
MCV RBC AUTO: 86.8 FL — SIGNIFICANT CHANGE UP (ref 81–99)
MCV RBC AUTO: 86.9 FL — SIGNIFICANT CHANGE UP (ref 81–99)
MCV RBC AUTO: 90.3 FL — SIGNIFICANT CHANGE UP (ref 81–99)
MCV RBC AUTO: 90.8 FL — SIGNIFICANT CHANGE UP (ref 81–99)
MCV RBC AUTO: 93.3 FL — SIGNIFICANT CHANGE UP (ref 81–99)
METHOD TYPE: SIGNIFICANT CHANGE UP
MONOCYTES # BLD AUTO: 0.95 K/UL — HIGH (ref 0.1–0.6)
MONOCYTES NFR BLD AUTO: 4.4 % — SIGNIFICANT CHANGE UP (ref 1.7–9.3)
MRSA PCR RESULT.: NEGATIVE — SIGNIFICANT CHANGE UP
NEUTROPHILS # BLD AUTO: 18.64 K/UL — HIGH (ref 1.4–6.5)
NEUTROPHILS NFR BLD AUTO: 85.7 % — HIGH (ref 42.2–75.2)
NITRITE UR-MCNC: NEGATIVE — SIGNIFICANT CHANGE UP
NON HDL CHOLESTEROL: 43 MG/DL — SIGNIFICANT CHANGE UP
NRBC # BLD: 0 /100 WBCS — SIGNIFICANT CHANGE UP (ref 0–0)
NRBC # BLD: 1 /100 WBCS — HIGH (ref 0–0)
NRBC # BLD: 2 /100 WBCS — HIGH (ref 0–0)
NRBC # BLD: 3 /100 WBCS — HIGH (ref 0–0)
NRBC # BLD: 5 /100 WBCS — HIGH (ref 0–0)
NRBC # BLD: 5 /100 WBCS — HIGH (ref 0–0)
NRBC # BLD: 8 /100 WBCS — HIGH (ref 0–0)
NT-PROBNP SERPL-SCNC: 3197 PG/ML — HIGH (ref 0–300)
OSMOLALITY SERPL: 415 MOS/KG — HIGH (ref 280–301)
OVALOCYTES BLD QL SMEAR: SLIGHT — SIGNIFICANT CHANGE UP
PCO2 BLDA: <19 MMHG — LOW (ref 32–45)
PCO2 BLDV: 30 MMHG — LOW (ref 39–42)
PCO2 BLDV: 30 MMHG — LOW (ref 39–42)
PH BLDA: 7.13 — CRITICAL LOW (ref 7.35–7.45)
PH BLDV: 6.95 — CRITICAL LOW (ref 7.32–7.43)
PH UR: 6 — SIGNIFICANT CHANGE UP (ref 5–8)
PHOSPHATE SERPL-MCNC: 4.5 MG/DL — SIGNIFICANT CHANGE UP (ref 2.1–4.9)
PHOSPHATE SERPL-MCNC: 8 MG/DL — HIGH (ref 2.1–4.9)
PLAT MORPH BLD: NORMAL — SIGNIFICANT CHANGE UP
PLATELET # BLD AUTO: 291 K/UL — SIGNIFICANT CHANGE UP (ref 130–400)
PLATELET # BLD AUTO: 365 K/UL — SIGNIFICANT CHANGE UP (ref 130–400)
PLATELET # BLD AUTO: 381 K/UL — SIGNIFICANT CHANGE UP (ref 130–400)
PLATELET # BLD AUTO: 440 K/UL — HIGH (ref 130–400)
PLATELET # BLD AUTO: 447 K/UL — HIGH (ref 130–400)
PLATELET # BLD AUTO: 468 K/UL — HIGH (ref 130–400)
PLATELET # BLD AUTO: 502 K/UL — HIGH (ref 130–400)
PLATELET # BLD AUTO: 563 K/UL — HIGH (ref 130–400)
PLATELET # BLD AUTO: 636 K/UL — HIGH (ref 130–400)
PLATELET COUNT - ESTIMATE: ABNORMAL
PMV BLD: 10.1 FL — SIGNIFICANT CHANGE UP (ref 7.4–10.4)
PMV BLD: 10.1 FL — SIGNIFICANT CHANGE UP (ref 7.4–10.4)
PMV BLD: 10.5 FL — HIGH (ref 7.4–10.4)
PMV BLD: 9 FL — SIGNIFICANT CHANGE UP (ref 7.4–10.4)
PMV BLD: 9.1 FL — SIGNIFICANT CHANGE UP (ref 7.4–10.4)
PMV BLD: 9.1 FL — SIGNIFICANT CHANGE UP (ref 7.4–10.4)
PMV BLD: 9.4 FL — SIGNIFICANT CHANGE UP (ref 7.4–10.4)
PMV BLD: 9.5 FL — SIGNIFICANT CHANGE UP (ref 7.4–10.4)
PMV BLD: 9.7 FL — SIGNIFICANT CHANGE UP (ref 7.4–10.4)
PO2 BLDA: 204 MMHG — HIGH (ref 83–108)
PO2 BLDV: 72 MMHG — HIGH (ref 25–45)
PO2 BLDV: 77 MMHG — HIGH (ref 25–45)
POTASSIUM BLDV-SCNC: 8.6 MMOL/L — CRITICAL HIGH (ref 3.5–5.1)
POTASSIUM BLDV-SCNC: >10 MMOL/L — CRITICAL HIGH (ref 3.5–5.1)
POTASSIUM SERPL-MCNC: 10 MMOL/L — CRITICAL HIGH (ref 3.5–5)
POTASSIUM SERPL-MCNC: 4.6 MMOL/L — SIGNIFICANT CHANGE UP (ref 3.5–5)
POTASSIUM SERPL-MCNC: 4.7 MMOL/L — SIGNIFICANT CHANGE UP (ref 3.5–5)
POTASSIUM SERPL-MCNC: 5 MMOL/L — SIGNIFICANT CHANGE UP (ref 3.5–5)
POTASSIUM SERPL-MCNC: 5.2 MMOL/L — HIGH (ref 3.5–5)
POTASSIUM SERPL-MCNC: 6.6 MMOL/L — CRITICAL HIGH (ref 3.5–5)
POTASSIUM SERPL-SCNC: 10 MMOL/L — CRITICAL HIGH (ref 3.5–5)
POTASSIUM SERPL-SCNC: 4.6 MMOL/L — SIGNIFICANT CHANGE UP (ref 3.5–5)
POTASSIUM SERPL-SCNC: 4.7 MMOL/L — SIGNIFICANT CHANGE UP (ref 3.5–5)
POTASSIUM SERPL-SCNC: 5 MMOL/L — SIGNIFICANT CHANGE UP (ref 3.5–5)
POTASSIUM SERPL-SCNC: 5.2 MMOL/L — HIGH (ref 3.5–5)
POTASSIUM SERPL-SCNC: 6.6 MMOL/L — CRITICAL HIGH (ref 3.5–5)
PROCALCITONIN SERPL-MCNC: 5.88 NG/ML — HIGH (ref 0.02–0.1)
PROT SERPL-MCNC: 4.3 G/DL — LOW (ref 6–8)
PROT SERPL-MCNC: 4.6 G/DL — LOW (ref 6–8)
PROT SERPL-MCNC: 6.4 G/DL — SIGNIFICANT CHANGE UP (ref 6–8)
PROT UR-MCNC: 100 MG/DL
PROTHROM AB SERPL-ACNC: 15.4 SEC — HIGH (ref 9.95–12.87)
PROTHROM AB SERPL-ACNC: 18.5 SEC — HIGH (ref 9.95–12.87)
RBC # BLD: 2.55 M/UL — LOW (ref 4.2–5.4)
RBC # BLD: 3.19 M/UL — LOW (ref 4.2–5.4)
RBC # BLD: 3.34 M/UL — LOW (ref 4.2–5.4)
RBC # BLD: 3.37 M/UL — LOW (ref 4.2–5.4)
RBC # BLD: 3.41 M/UL — LOW (ref 4.2–5.4)
RBC # BLD: 3.53 M/UL — LOW (ref 4.2–5.4)
RBC # BLD: 3.56 M/UL — LOW (ref 4.2–5.4)
RBC # BLD: 3.59 M/UL — LOW (ref 4.2–5.4)
RBC # BLD: 3.78 M/UL — LOW (ref 4.2–5.4)
RBC # FLD: 20.2 % — HIGH (ref 11.5–14.5)
RBC # FLD: 20.3 % — HIGH (ref 11.5–14.5)
RBC # FLD: 20.3 % — HIGH (ref 11.5–14.5)
RBC # FLD: 20.5 % — HIGH (ref 11.5–14.5)
RBC # FLD: 21.1 % — HIGH (ref 11.5–14.5)
RBC # FLD: 22.7 % — HIGH (ref 11.5–14.5)
RBC # FLD: 23 % — HIGH (ref 11.5–14.5)
RBC BLD AUTO: NORMAL — SIGNIFICANT CHANGE UP
RBC CASTS # UR COMP ASSIST: ABNORMAL /HPF
RSV RNA NPH QL NAA+NON-PROBE: SIGNIFICANT CHANGE UP
SAO2 % BLDA: 97.2 % — SIGNIFICANT CHANGE UP (ref 94–98)
SAO2 % BLDV: 86 % — SIGNIFICANT CHANGE UP (ref 67–88)
SAO2 % BLDV: 94.2 % — HIGH (ref 67–88)
SARS-COV-2 RNA SPEC QL NAA+PROBE: SIGNIFICANT CHANGE UP
SODIUM SERPL-SCNC: 140 MMOL/L — SIGNIFICANT CHANGE UP (ref 135–146)
SODIUM SERPL-SCNC: 145 MMOL/L — SIGNIFICANT CHANGE UP (ref 135–146)
SODIUM SERPL-SCNC: 145 MMOL/L — SIGNIFICANT CHANGE UP (ref 135–146)
SODIUM SERPL-SCNC: 150 MMOL/L — HIGH (ref 135–146)
SODIUM SERPL-SCNC: 150 MMOL/L — HIGH (ref 135–146)
SODIUM SERPL-SCNC: 151 MMOL/L — HIGH (ref 135–146)
SODIUM SERPL-SCNC: 156 MMOL/L — HIGH (ref 135–146)
SODIUM SERPL-SCNC: 165 MMOL/L — CRITICAL HIGH (ref 135–146)
SP GR SPEC: 1.01 — SIGNIFICANT CHANGE UP (ref 1–1.03)
SPECIMEN SOURCE: SIGNIFICANT CHANGE UP
SPHEROCYTES BLD QL SMEAR: SLIGHT — SIGNIFICANT CHANGE UP
TRIGL SERPL-MCNC: 80 MG/DL — SIGNIFICANT CHANGE UP
TROPONIN SAMPLING TIME: SIGNIFICANT CHANGE UP
TROPONIN SAMPLING TIME: SIGNIFICANT CHANGE UP
TROPONIN T, HIGH SENSITIVITY RESULT: 190 NG/L — CRITICAL HIGH (ref 6–13)
TROPONIN T, HIGH SENSITIVITY RESULT: 217 NG/L — CRITICAL HIGH (ref 6–13)
TROPONIN T, HIGH SENSITIVITY RESULT: 233 NG/L — CRITICAL HIGH (ref 6–13)
TROPONIN T, HIGH SENSITIVITY RESULT: 256 NG/L — CRITICAL HIGH (ref 6–13)
TSH SERPL-MCNC: 4.55 UIU/ML — HIGH (ref 0.27–4.2)
UROBILINOGEN FLD QL: 0.2 MG/DL — SIGNIFICANT CHANGE UP (ref 0.2–1)
WBC # BLD: 11.62 K/UL — HIGH (ref 4.8–10.8)
WBC # BLD: 14.85 K/UL — HIGH (ref 4.8–10.8)
WBC # BLD: 18.98 K/UL — HIGH (ref 4.8–10.8)
WBC # BLD: 21.75 K/UL — HIGH (ref 4.8–10.8)
WBC # BLD: 22.71 K/UL — HIGH (ref 4.8–10.8)
WBC # BLD: 7.38 K/UL — SIGNIFICANT CHANGE UP (ref 4.8–10.8)
WBC # BLD: 7.51 K/UL — SIGNIFICANT CHANGE UP (ref 4.8–10.8)
WBC # BLD: 8.83 K/UL — SIGNIFICANT CHANGE UP (ref 4.8–10.8)
WBC # BLD: 9.31 K/UL — SIGNIFICANT CHANGE UP (ref 4.8–10.8)
WBC # FLD AUTO: 11.62 K/UL — HIGH (ref 4.8–10.8)
WBC # FLD AUTO: 14.85 K/UL — HIGH (ref 4.8–10.8)
WBC # FLD AUTO: 18.98 K/UL — HIGH (ref 4.8–10.8)
WBC # FLD AUTO: 21.75 K/UL — HIGH (ref 4.8–10.8)
WBC # FLD AUTO: 22.71 K/UL — HIGH (ref 4.8–10.8)
WBC # FLD AUTO: 7.38 K/UL — SIGNIFICANT CHANGE UP (ref 4.8–10.8)
WBC # FLD AUTO: 7.51 K/UL — SIGNIFICANT CHANGE UP (ref 4.8–10.8)
WBC # FLD AUTO: 8.83 K/UL — SIGNIFICANT CHANGE UP (ref 4.8–10.8)
WBC # FLD AUTO: 9.31 K/UL — SIGNIFICANT CHANGE UP (ref 4.8–10.8)
WBC UR QL: ABNORMAL /HPF (ref 0–5)

## 2024-01-01 PROCEDURE — 85027 COMPLETE CBC AUTOMATED: CPT

## 2024-01-01 PROCEDURE — 82010 KETONE BODYS QUAN: CPT

## 2024-01-01 PROCEDURE — 80053 COMPREHEN METABOLIC PANEL: CPT

## 2024-01-01 PROCEDURE — 99232 SBSQ HOSP IP/OBS MODERATE 35: CPT

## 2024-01-01 PROCEDURE — 71045 X-RAY EXAM CHEST 1 VIEW: CPT | Mod: 26,76

## 2024-01-01 PROCEDURE — 87641 MR-STAPH DNA AMP PROBE: CPT

## 2024-01-01 PROCEDURE — 83036 HEMOGLOBIN GLYCOSYLATED A1C: CPT

## 2024-01-01 PROCEDURE — 83605 ASSAY OF LACTIC ACID: CPT

## 2024-01-01 PROCEDURE — 82330 ASSAY OF CALCIUM: CPT

## 2024-01-01 PROCEDURE — 82962 GLUCOSE BLOOD TEST: CPT

## 2024-01-01 PROCEDURE — 99292 CRITICAL CARE ADDL 30 MIN: CPT | Mod: 25

## 2024-01-01 PROCEDURE — 85018 HEMOGLOBIN: CPT

## 2024-01-01 PROCEDURE — 99223 1ST HOSP IP/OBS HIGH 75: CPT

## 2024-01-01 PROCEDURE — 99233 SBSQ HOSP IP/OBS HIGH 50: CPT

## 2024-01-01 PROCEDURE — 83930 ASSAY OF BLOOD OSMOLALITY: CPT

## 2024-01-01 PROCEDURE — 99291 CRITICAL CARE FIRST HOUR: CPT

## 2024-01-01 PROCEDURE — 82803 BLOOD GASES ANY COMBINATION: CPT

## 2024-01-01 PROCEDURE — 74018 RADEX ABDOMEN 1 VIEW: CPT

## 2024-01-01 PROCEDURE — 80076 HEPATIC FUNCTION PANEL: CPT

## 2024-01-01 PROCEDURE — 85730 THROMBOPLASTIN TIME PARTIAL: CPT

## 2024-01-01 PROCEDURE — 86923 COMPATIBILITY TEST ELECTRIC: CPT

## 2024-01-01 PROCEDURE — 87640 STAPH A DNA AMP PROBE: CPT

## 2024-01-01 PROCEDURE — P9040: CPT

## 2024-01-01 PROCEDURE — 86850 RBC ANTIBODY SCREEN: CPT

## 2024-01-01 PROCEDURE — 71045 X-RAY EXAM CHEST 1 VIEW: CPT | Mod: 26

## 2024-01-01 PROCEDURE — 36556 INSERT NON-TUNNEL CV CATH: CPT

## 2024-01-01 PROCEDURE — 93010 ELECTROCARDIOGRAM REPORT: CPT | Mod: 77

## 2024-01-01 PROCEDURE — 70450 CT HEAD/BRAIN W/O DYE: CPT | Mod: 26,MC

## 2024-01-01 PROCEDURE — 93306 TTE W/DOPPLER COMPLETE: CPT | Mod: 26

## 2024-01-01 PROCEDURE — 84484 ASSAY OF TROPONIN QUANT: CPT

## 2024-01-01 PROCEDURE — 86901 BLOOD TYPING SEROLOGIC RH(D): CPT

## 2024-01-01 PROCEDURE — 99291 CRITICAL CARE FIRST HOUR: CPT | Mod: 25

## 2024-01-01 PROCEDURE — 36415 COLL VENOUS BLD VENIPUNCTURE: CPT

## 2024-01-01 PROCEDURE — 74018 RADEX ABDOMEN 1 VIEW: CPT | Mod: 26

## 2024-01-01 PROCEDURE — 93306 TTE W/DOPPLER COMPLETE: CPT

## 2024-01-01 PROCEDURE — 86900 BLOOD TYPING SEROLOGIC ABO: CPT

## 2024-01-01 PROCEDURE — 85610 PROTHROMBIN TIME: CPT

## 2024-01-01 PROCEDURE — 83735 ASSAY OF MAGNESIUM: CPT

## 2024-01-01 PROCEDURE — 84295 ASSAY OF SERUM SODIUM: CPT

## 2024-01-01 PROCEDURE — 0241U: CPT

## 2024-01-01 PROCEDURE — 84132 ASSAY OF SERUM POTASSIUM: CPT

## 2024-01-01 PROCEDURE — 84100 ASSAY OF PHOSPHORUS: CPT

## 2024-01-01 PROCEDURE — 93005 ELECTROCARDIOGRAM TRACING: CPT

## 2024-01-01 PROCEDURE — 36430 TRANSFUSION BLD/BLD COMPNT: CPT

## 2024-01-01 PROCEDURE — 99223 1ST HOSP IP/OBS HIGH 75: CPT | Mod: 25

## 2024-01-01 PROCEDURE — 85014 HEMATOCRIT: CPT

## 2024-01-01 PROCEDURE — 80061 LIPID PANEL: CPT

## 2024-01-01 PROCEDURE — 99497 ADVNCD CARE PLAN 30 MIN: CPT | Mod: 25

## 2024-01-01 PROCEDURE — 84145 PROCALCITONIN (PCT): CPT

## 2024-01-01 PROCEDURE — 80048 BASIC METABOLIC PNL TOTAL CA: CPT

## 2024-01-01 PROCEDURE — 84443 ASSAY THYROID STIM HORMONE: CPT

## 2024-01-01 PROCEDURE — 93010 ELECTROCARDIOGRAM REPORT: CPT

## 2024-01-01 PROCEDURE — 71045 X-RAY EXAM CHEST 1 VIEW: CPT

## 2024-01-01 RX ORDER — INSULIN REGULAR, HUMAN 100/ML (3)
5 INSULIN PEN (ML) SUBCUTANEOUS ONCE
Refills: 0 | Status: COMPLETED | OUTPATIENT
Start: 2024-01-01 | End: 2024-01-01

## 2024-01-01 RX ORDER — MEROPENEM 500 MG/10ML
500 INJECTION, POWDER, FOR SOLUTION INTRAVENOUS ONCE
Refills: 0 | Status: COMPLETED | OUTPATIENT
Start: 2024-01-01 | End: 2024-01-01

## 2024-01-01 RX ORDER — DEXTROSE 15 G/33 G
50 GEL IN PACKET (GRAM) ORAL ONCE
Refills: 0 | Status: COMPLETED | OUTPATIENT
Start: 2024-01-01 | End: 2024-01-01

## 2024-01-01 RX ORDER — DEXTROSE 15 G/33 G
100 GEL IN PACKET (GRAM) ORAL ONCE
Refills: 0 | Status: COMPLETED | OUTPATIENT
Start: 2024-01-01 | End: 2024-01-01

## 2024-01-01 RX ORDER — IPRATROPIUM BROMIDE AND ALBUTEROL SULFATE .5; 3 MG/3ML; MG/3ML
3 SOLUTION RESPIRATORY (INHALATION)
Refills: 0 | Status: COMPLETED | OUTPATIENT
Start: 2024-01-01 | End: 2024-01-01

## 2024-01-01 RX ORDER — CALCIUM GLUCONATE 61(648) MG
2 TABLET ORAL ONCE
Refills: 0 | Status: COMPLETED | OUTPATIENT
Start: 2024-01-01 | End: 2024-01-01

## 2024-01-01 RX ORDER — HEPARIN SODIUM,BOVINE 1000/ML
5000 VIAL (ML) INJECTION ONCE
Refills: 0 | Status: COMPLETED | OUTPATIENT
Start: 2024-01-01 | End: 2024-01-01

## 2024-01-01 RX ORDER — MEROPENEM 500 MG/10ML
1000 INJECTION, POWDER, FOR SOLUTION INTRAVENOUS EVERY 8 HOURS
Refills: 0 | Status: DISCONTINUED | OUTPATIENT
Start: 2024-01-01 | End: 2024-01-01

## 2024-01-01 RX ORDER — IPRATROPIUM BROMIDE AND ALBUTEROL SULFATE .5; 3 MG/3ML; MG/3ML
3 SOLUTION RESPIRATORY (INHALATION) EVERY 6 HOURS
Refills: 0 | Status: DISCONTINUED | OUTPATIENT
Start: 2024-01-01 | End: 2024-01-01

## 2024-01-01 RX ORDER — INSULIN REGULAR, HUMAN 100/ML (3)
6 INSULIN PEN (ML) SUBCUTANEOUS
Qty: 100 | Refills: 0 | Status: DISCONTINUED | OUTPATIENT
Start: 2024-01-01 | End: 2024-01-01

## 2024-01-01 RX ORDER — SODIUM BICARBONATE 84 MG/ML
0.71 INJECTION, SOLUTION INTRAVENOUS
Qty: 150 | Refills: 0 | Status: DISCONTINUED | OUTPATIENT
Start: 2024-01-01 | End: 2024-01-01

## 2024-01-01 RX ORDER — ROSUVASTATIN CALCIUM 10 MG/1
10 TABLET ORAL AT BEDTIME
Refills: 0 | Status: DISCONTINUED | OUTPATIENT
Start: 2024-01-01 | End: 2024-01-01

## 2024-01-01 RX ORDER — SODIUM CHLORIDE 9 MG/ML
10 INJECTION INTRAMUSCULAR; INTRAVENOUS; SUBCUTANEOUS
Refills: 0 | Status: DISCONTINUED | OUTPATIENT
Start: 2024-01-01 | End: 2024-01-01

## 2024-01-01 RX ORDER — SODIUM BICARBONATE 84 MG/ML
50 INJECTION, SOLUTION INTRAVENOUS ONCE
Refills: 0 | Status: COMPLETED | OUTPATIENT
Start: 2024-01-01 | End: 2024-01-01

## 2024-01-01 RX ORDER — VANCOMYCIN/0.9 % SOD CHLORIDE 1.75G/25
1000 PLASTIC BAG, INJECTION (ML) INTRAVENOUS ONCE
Refills: 0 | Status: COMPLETED | OUTPATIENT
Start: 2024-01-01 | End: 2024-01-01

## 2024-01-01 RX ORDER — SODIUM BICARBONATE 84 MG/ML
150 INJECTION, SOLUTION INTRAVENOUS ONCE
Refills: 0 | Status: COMPLETED | OUTPATIENT
Start: 2024-01-01 | End: 2024-01-01

## 2024-01-01 RX ORDER — PAROXETINE 10 MG/1
30 TABLET, FILM COATED ORAL DAILY
Refills: 0 | Status: DISCONTINUED | OUTPATIENT
Start: 2024-01-01 | End: 2024-01-01

## 2024-01-01 RX ORDER — INSULIN REGULAR, HUMAN 100/ML (3)
10 INSULIN PEN (ML) SUBCUTANEOUS ONCE
Refills: 0 | Status: COMPLETED | OUTPATIENT
Start: 2024-01-01 | End: 2024-01-01

## 2024-01-01 RX ORDER — DEXTROSE 15 G/33 G
50 GEL IN PACKET (GRAM) ORAL
Refills: 0 | Status: DISCONTINUED | OUTPATIENT
Start: 2024-01-01 | End: 2024-01-01

## 2024-01-01 RX ORDER — DEXTROSE 15 G/33 G
25 GEL IN PACKET (GRAM) ORAL ONCE
Refills: 0 | Status: DISCONTINUED | OUTPATIENT
Start: 2024-01-01 | End: 2024-01-01

## 2024-01-01 RX ORDER — CEFEPIME 2 G/1
2000 INJECTION, POWDER, FOR SOLUTION INTRAVENOUS ONCE
Refills: 0 | Status: COMPLETED | OUTPATIENT
Start: 2024-01-01 | End: 2024-01-01

## 2024-01-01 RX ORDER — HEPARIN SODIUM,BOVINE 1000/ML
500 VIAL (ML) INJECTION
Qty: 25000 | Refills: 0 | Status: DISCONTINUED | OUTPATIENT
Start: 2024-01-01 | End: 2024-01-01

## 2024-01-01 RX ORDER — CHLORHEXIDINE GLUCONATE 40 MG/ML
1 SOLUTION TOPICAL
Refills: 0 | Status: DISCONTINUED | OUTPATIENT
Start: 2024-01-01 | End: 2024-01-01

## 2024-01-01 RX ORDER — ROPIVACAINE IN 0.9% SOD CHL/PF 0.1 %
0.05 PLASTIC BAG, INJECTION (ML) EPIDURAL
Qty: 16 | Refills: 0 | Status: DISCONTINUED | OUTPATIENT
Start: 2024-01-01 | End: 2024-01-01

## 2024-01-01 RX ORDER — MEROPENEM 500 MG/10ML
1000 INJECTION, POWDER, FOR SOLUTION INTRAVENOUS ONCE
Refills: 0 | Status: DISCONTINUED | OUTPATIENT
Start: 2024-01-01 | End: 2024-01-01

## 2024-01-01 RX ORDER — THIAMINE HCL 250 MG
500 TABLET ORAL EVERY 8 HOURS
Refills: 0 | Status: DISCONTINUED | OUTPATIENT
Start: 2024-01-01 | End: 2024-01-01

## 2024-01-01 RX ORDER — PANTOPRAZOLE SODIUM 40 MG
40 TABLET, DELAYED RELEASE (ENTERIC COATED) ORAL DAILY
Refills: 0 | Status: DISCONTINUED | OUTPATIENT
Start: 2024-01-01 | End: 2024-01-01

## 2024-01-01 RX ORDER — HEPARIN SODIUM,BOVINE 1000/ML
400 VIAL (ML) INJECTION
Qty: 25000 | Refills: 0 | Status: DISCONTINUED | OUTPATIENT
Start: 2024-01-01 | End: 2024-01-01

## 2024-01-01 RX ORDER — HEPARIN SODIUM,BOVINE 1000/ML
5000 VIAL (ML) INJECTION EVERY 12 HOURS
Refills: 0 | Status: DISCONTINUED | OUTPATIENT
Start: 2024-01-01 | End: 2024-01-01

## 2024-01-01 RX ORDER — ROPIVACAINE IN 0.9% SOD CHL/PF 0.1 %
0.05 PLASTIC BAG, INJECTION (ML) EPIDURAL
Qty: 8 | Refills: 0 | Status: DISCONTINUED | OUTPATIENT
Start: 2024-01-01 | End: 2024-01-01

## 2024-01-01 RX ORDER — GENTAMICIN 40 MG/ML
160 INJECTION, SOLUTION INTRAMUSCULAR; INTRAVENOUS ONCE
Refills: 0 | Status: COMPLETED | OUTPATIENT
Start: 2024-01-01 | End: 2024-01-01

## 2024-01-01 RX ORDER — MEMANTINE 7 MG/1
10 CAPSULE, EXTENDED RELEASE ORAL
Refills: 0 | Status: DISCONTINUED | OUTPATIENT
Start: 2024-01-01 | End: 2024-01-01

## 2024-01-01 RX ORDER — METHYLPREDNISOLONE 4 MG
125 TABLET ORAL ONCE
Refills: 0 | Status: COMPLETED | OUTPATIENT
Start: 2024-01-01 | End: 2024-01-01

## 2024-01-01 RX ORDER — VASOPRESSIN 20 [USP'U]/ML
0.04 INJECTION INTRAVENOUS
Qty: 40 | Refills: 0 | Status: DISCONTINUED | OUTPATIENT
Start: 2024-01-01 | End: 2024-01-01

## 2024-01-01 RX ADMIN — Medication 1000 MILLILITER(S): at 17:18

## 2024-01-01 RX ADMIN — Medication 105 MILLIGRAM(S): at 21:39

## 2024-01-01 RX ADMIN — Medication 400 UNIT(S)/HR: at 09:03

## 2024-01-01 RX ADMIN — Medication 125 MILLIGRAM(S): at 17:18

## 2024-01-01 RX ADMIN — Medication 100 GRAM(S): at 18:18

## 2024-01-01 RX ADMIN — VASOPRESSIN 6 UNIT(S)/MIN: 20 INJECTION INTRAVENOUS at 09:51

## 2024-01-01 RX ADMIN — CHLORHEXIDINE GLUCONATE 1 APPLICATION(S): 40 SOLUTION TOPICAL at 06:14

## 2024-01-01 RX ADMIN — SODIUM BICARBONATE 150 MILLIEQUIVALENT(S): 84 INJECTION, SOLUTION INTRAVENOUS at 19:29

## 2024-01-01 RX ADMIN — Medication 105 MILLIGRAM(S): at 18:51

## 2024-01-01 RX ADMIN — SODIUM BICARBONATE 50 MILLIEQUIVALENT(S): 84 INJECTION, SOLUTION INTRAVENOUS at 02:15

## 2024-01-01 RX ADMIN — Medication 5 UNIT(S): at 17:43

## 2024-01-01 RX ADMIN — Medication 2.92 MICROGRAM(S)/KG/MIN: at 09:47

## 2024-01-01 RX ADMIN — Medication 5000 UNIT(S): at 02:45

## 2024-01-01 RX ADMIN — Medication 1000 MILLILITER(S): at 17:48

## 2024-01-01 RX ADMIN — CEFEPIME 100 MILLIGRAM(S): 2 INJECTION, POWDER, FOR SOLUTION INTRAVENOUS at 20:38

## 2024-01-01 RX ADMIN — Medication 250 MILLIGRAM(S): at 18:52

## 2024-01-01 RX ADMIN — Medication 6 UNIT(S)/HR: at 02:19

## 2024-01-01 RX ADMIN — IPRATROPIUM BROMIDE AND ALBUTEROL SULFATE 3 MILLILITER(S): .5; 3 SOLUTION RESPIRATORY (INHALATION) at 18:02

## 2024-01-01 RX ADMIN — GENTAMICIN 208 MILLIGRAM(S): 40 INJECTION, SOLUTION INTRAMUSCULAR; INTRAVENOUS at 16:17

## 2024-01-01 RX ADMIN — Medication 100 GRAM(S): at 19:41

## 2024-01-01 RX ADMIN — SODIUM BICARBONATE 150 MEQ/KG/HR: 84 INJECTION, SOLUTION INTRAVENOUS at 20:38

## 2024-01-01 RX ADMIN — Medication 5 UNIT(S)/HR: at 08:50

## 2024-01-01 RX ADMIN — CHLORHEXIDINE GLUCONATE 1 APPLICATION(S): 40 SOLUTION TOPICAL at 05:08

## 2024-01-01 RX ADMIN — Medication 500 UNIT(S)/HR: at 07:43

## 2024-01-01 RX ADMIN — Medication 10 UNIT(S): at 21:15

## 2024-01-01 RX ADMIN — Medication 100 GRAM(S): at 19:39

## 2024-01-01 RX ADMIN — Medication 1500 MILLILITER(S): at 21:17

## 2024-01-01 RX ADMIN — MEROPENEM 100 MILLIGRAM(S): 500 INJECTION, POWDER, FOR SOLUTION INTRAVENOUS at 21:39

## 2024-01-01 RX ADMIN — MEROPENEM 100 MILLIGRAM(S): 500 INJECTION, POWDER, FOR SOLUTION INTRAVENOUS at 02:20

## 2024-01-01 RX ADMIN — SODIUM BICARBONATE 150 MILLIEQUIVALENT(S): 84 INJECTION, SOLUTION INTRAVENOUS at 21:40

## 2024-01-01 RX ADMIN — SODIUM BICARBONATE 150 MILLIEQUIVALENT(S): 84 INJECTION, SOLUTION INTRAVENOUS at 17:39

## 2024-01-01 RX ADMIN — SODIUM BICARBONATE 150 MEQ/KG/HR: 84 INJECTION, SOLUTION INTRAVENOUS at 06:46

## 2024-01-01 RX ADMIN — Medication 2.92 MICROGRAM(S)/KG/MIN: at 11:08

## 2024-01-01 RX ADMIN — Medication 2.98 MICROGRAM(S)/KG/MIN: at 17:00

## 2024-01-01 RX ADMIN — Medication 50 MILLILITER(S): at 13:33

## 2024-01-01 RX ADMIN — Medication 1500 MILLILITER(S): at 19:32

## 2024-01-01 RX ADMIN — Medication 400 UNIT(S)/HR: at 23:25

## 2024-01-01 RX ADMIN — Medication 100 GRAM(S): at 17:35

## 2024-01-01 RX ADMIN — Medication 40 MILLIGRAM(S): at 11:17

## 2024-01-01 RX ADMIN — MEROPENEM 100 MILLIGRAM(S): 500 INJECTION, POWDER, FOR SOLUTION INTRAVENOUS at 08:50

## 2024-01-01 RX ADMIN — Medication 25 GRAM(S): at 03:03

## 2024-01-01 RX ADMIN — Medication 105 MILLIGRAM(S): at 05:07

## 2024-01-01 RX ADMIN — Medication 40 MILLIGRAM(S): at 11:09

## 2024-01-01 RX ADMIN — Medication 2.5 MILLIGRAM(S): at 18:19

## 2024-01-01 RX ADMIN — SODIUM BICARBONATE 50 MILLIEQUIVALENT(S): 84 INJECTION, SOLUTION INTRAVENOUS at 02:18

## 2024-01-01 RX ADMIN — IPRATROPIUM BROMIDE AND ALBUTEROL SULFATE 3 MILLILITER(S): .5; 3 SOLUTION RESPIRATORY (INHALATION) at 17:18

## 2024-01-01 RX ADMIN — Medication 10 UNIT(S): at 01:33

## 2024-01-01 RX ADMIN — SODIUM BICARBONATE 50 MILLIEQUIVALENT(S): 84 INJECTION, SOLUTION INTRAVENOUS at 02:20

## 2024-01-01 RX ADMIN — SODIUM BICARBONATE 150 MEQ/KG/HR: 84 INJECTION, SOLUTION INTRAVENOUS at 21:40

## 2024-01-01 RX ADMIN — Medication 2.92 MICROGRAM(S)/KG/MIN: at 00:18

## 2024-01-01 RX ADMIN — Medication 2.5 MILLIGRAM(S): at 19:59

## 2024-01-01 RX ADMIN — Medication 5 UNIT(S): at 19:38

## 2024-01-01 RX ADMIN — MEROPENEM 100 MILLIGRAM(S): 500 INJECTION, POWDER, FOR SOLUTION INTRAVENOUS at 05:07

## 2024-01-01 RX ADMIN — Medication 5 UNIT(S): at 20:02

## 2024-01-01 RX ADMIN — Medication 2.92 MICROGRAM(S)/KG/MIN: at 05:08

## 2024-01-01 RX ADMIN — Medication 5 UNIT(S)/HR: at 02:45

## 2024-01-01 RX ADMIN — IPRATROPIUM BROMIDE AND ALBUTEROL SULFATE 3 MILLILITER(S): .5; 3 SOLUTION RESPIRATORY (INHALATION) at 17:47

## 2024-01-02 ENCOUNTER — APPOINTMENT (OUTPATIENT)
Dept: NEUROLOGY | Facility: CLINIC | Age: 84
End: 2024-01-02
Payer: MEDICARE

## 2024-01-02 DIAGNOSIS — F02.80 ALZHEIMER'S DISEASE, UNSPECIFIED: ICD-10-CM

## 2024-01-02 DIAGNOSIS — G30.9 ALZHEIMER'S DISEASE, UNSPECIFIED: ICD-10-CM

## 2024-01-02 DIAGNOSIS — G47.9 SLEEP DISORDER, UNSPECIFIED: ICD-10-CM

## 2024-01-02 PROCEDURE — 99442: CPT | Mod: 93

## 2024-01-02 RX ORDER — GABAPENTIN 100 MG/1
100 CAPSULE ORAL AT BEDTIME
Qty: 180 | Refills: 1 | Status: ACTIVE | COMMUNITY
Start: 2023-10-18

## 2024-01-02 RX ORDER — MEMANTINE HYDROCHLORIDE 28 MG/1
28 CAPSULE, EXTENDED RELEASE ORAL
Qty: 90 | Refills: 1 | Status: ACTIVE | COMMUNITY
Start: 2022-11-22

## 2024-05-01 ENCOUNTER — INPATIENT (INPATIENT)
Facility: HOSPITAL | Age: 84
LOS: 1 days | Discharge: HOME HEALTH | DRG: 871 | End: 2024-05-03
Attending: STUDENT IN AN ORGANIZED HEALTH CARE EDUCATION/TRAINING PROGRAM | Admitting: HOSPITALIST
Payer: MEDICARE

## 2024-05-01 VITALS
WEIGHT: 179.9 LBS | OXYGEN SATURATION: 89 % | DIASTOLIC BLOOD PRESSURE: 68 MMHG | HEIGHT: 63 IN | HEART RATE: 128 BPM | TEMPERATURE: 100 F | RESPIRATION RATE: 20 BRPM | SYSTOLIC BLOOD PRESSURE: 145 MMHG

## 2024-05-01 DIAGNOSIS — J18.9 PNEUMONIA, UNSPECIFIED ORGANISM: ICD-10-CM

## 2024-05-01 LAB
ALBUMIN SERPL ELPH-MCNC: 3.3 G/DL — LOW (ref 3.5–5.2)
ALP SERPL-CCNC: 77 U/L — SIGNIFICANT CHANGE UP (ref 30–115)
ALT FLD-CCNC: 21 U/L — SIGNIFICANT CHANGE UP (ref 0–41)
ANION GAP SERPL CALC-SCNC: 18 MMOL/L — HIGH (ref 7–14)
ANISOCYTOSIS BLD QL: SLIGHT — SIGNIFICANT CHANGE UP
APPEARANCE UR: ABNORMAL
APTT BLD: 27.4 SEC — SIGNIFICANT CHANGE UP (ref 27–39.2)
AST SERPL-CCNC: 34 U/L — SIGNIFICANT CHANGE UP (ref 0–41)
BACTERIA # UR AUTO: ABNORMAL /HPF
BASOPHILS # BLD AUTO: 0.08 K/UL — SIGNIFICANT CHANGE UP (ref 0–0.2)
BASOPHILS NFR BLD AUTO: 1 % — SIGNIFICANT CHANGE UP (ref 0–1)
BILIRUB SERPL-MCNC: 0.3 MG/DL — SIGNIFICANT CHANGE UP (ref 0.2–1.2)
BILIRUB UR-MCNC: NEGATIVE — SIGNIFICANT CHANGE UP
BUN SERPL-MCNC: 31 MG/DL — HIGH (ref 10–20)
CALCIUM SERPL-MCNC: 9.1 MG/DL — SIGNIFICANT CHANGE UP (ref 8.4–10.5)
CHLORIDE SERPL-SCNC: 109 MMOL/L — SIGNIFICANT CHANGE UP (ref 98–110)
CO2 SERPL-SCNC: 16 MMOL/L — LOW (ref 17–32)
COLOR SPEC: YELLOW — SIGNIFICANT CHANGE UP
CREAT SERPL-MCNC: 1.8 MG/DL — HIGH (ref 0.7–1.5)
DIFF PNL FLD: ABNORMAL
EGFR: 27 ML/MIN/1.73M2 — LOW
EOSINOPHIL NFR BLD AUTO: 0 % — SIGNIFICANT CHANGE UP (ref 0–8)
EPI CELLS # UR: PRESENT
FLUAV AG NPH QL: SIGNIFICANT CHANGE UP
FLUBV AG NPH QL: SIGNIFICANT CHANGE UP
GLUCOSE SERPL-MCNC: 169 MG/DL — HIGH (ref 70–99)
GLUCOSE UR QL: NEGATIVE MG/DL — SIGNIFICANT CHANGE UP
HCT VFR BLD CALC: 29.6 % — LOW (ref 37–47)
HGB BLD-MCNC: 9.3 G/DL — LOW (ref 12–16)
HYPOCHROMIA BLD QL: SLIGHT — SIGNIFICANT CHANGE UP
INR BLD: 1.12 RATIO — SIGNIFICANT CHANGE UP (ref 0.65–1.3)
KETONES UR-MCNC: NEGATIVE MG/DL — SIGNIFICANT CHANGE UP
LACTATE SERPL-SCNC: 1.5 MMOL/L — SIGNIFICANT CHANGE UP (ref 0.7–2)
LEUKOCYTE ESTERASE UR-ACNC: ABNORMAL
LYMPHOCYTES # BLD AUTO: 0.24 K/UL — LOW (ref 1.2–3.4)
LYMPHOCYTES # BLD AUTO: 3 % — LOW (ref 20.5–51.1)
MANUAL SMEAR VERIFICATION: SIGNIFICANT CHANGE UP
MCHC RBC-ENTMCNC: 25.2 PG — LOW (ref 27–31)
MCHC RBC-ENTMCNC: 31.4 G/DL — LOW (ref 32–37)
MCV RBC AUTO: 80.2 FL — LOW (ref 81–99)
MONOCYTES # BLD AUTO: 0.16 K/UL — SIGNIFICANT CHANGE UP (ref 0.1–0.6)
MONOCYTES NFR BLD AUTO: 2 % — SIGNIFICANT CHANGE UP (ref 1.7–9.3)
NEUTROPHILS # BLD AUTO: 7.37 K/UL — HIGH (ref 1.4–6.5)
NEUTROPHILS NFR BLD AUTO: 91 % — HIGH (ref 42.2–75.2)
NEUTS BAND # BLD: 3 % — SIGNIFICANT CHANGE UP (ref 0–6)
NITRITE UR-MCNC: POSITIVE
NRBC # BLD: 0 /100 WBCS — SIGNIFICANT CHANGE UP (ref 0–0)
NRBC # BLD: SIGNIFICANT CHANGE UP /100 WBCS (ref 0–0)
PH UR: 6 — SIGNIFICANT CHANGE UP (ref 5–8)
PLAT MORPH BLD: NORMAL — SIGNIFICANT CHANGE UP
PLATELET # BLD AUTO: 366 K/UL — SIGNIFICANT CHANGE UP (ref 130–400)
PLATELET COUNT - ESTIMATE: NORMAL — SIGNIFICANT CHANGE UP
PMV BLD: 9.5 FL — SIGNIFICANT CHANGE UP (ref 7.4–10.4)
POTASSIUM SERPL-MCNC: 4.2 MMOL/L — SIGNIFICANT CHANGE UP (ref 3.5–5)
POTASSIUM SERPL-SCNC: 4.2 MMOL/L — SIGNIFICANT CHANGE UP (ref 3.5–5)
PROT SERPL-MCNC: 6.8 G/DL — SIGNIFICANT CHANGE UP (ref 6–8)
PROT UR-MCNC: 100 MG/DL
PROTHROM AB SERPL-ACNC: 12.8 SEC — SIGNIFICANT CHANGE UP (ref 9.95–12.87)
RBC # BLD: 3.69 M/UL — LOW (ref 4.2–5.4)
RBC # FLD: 15.9 % — HIGH (ref 11.5–14.5)
RBC BLD AUTO: ABNORMAL
RBC CASTS # UR COMP ASSIST: 10 /HPF — HIGH (ref 0–4)
RSV RNA NPH QL NAA+NON-PROBE: SIGNIFICANT CHANGE UP
SARS-COV-2 RNA SPEC QL NAA+PROBE: SIGNIFICANT CHANGE UP
SODIUM SERPL-SCNC: 143 MMOL/L — SIGNIFICANT CHANGE UP (ref 135–146)
SP GR SPEC: 1.01 — SIGNIFICANT CHANGE UP (ref 1–1.03)
SQUAMOUS # UR AUTO: 2 /HPF — SIGNIFICANT CHANGE UP (ref 0–5)
UROBILINOGEN FLD QL: 0.2 MG/DL — SIGNIFICANT CHANGE UP (ref 0.2–1)
WBC # BLD: 7.84 K/UL — SIGNIFICANT CHANGE UP (ref 4.8–10.8)
WBC # FLD AUTO: 7.84 K/UL — SIGNIFICANT CHANGE UP (ref 4.8–10.8)
WBC UR QL: 47 /HPF — HIGH (ref 0–5)

## 2024-05-01 PROCEDURE — 85025 COMPLETE CBC W/AUTO DIFF WBC: CPT

## 2024-05-01 PROCEDURE — 71045 X-RAY EXAM CHEST 1 VIEW: CPT | Mod: 26

## 2024-05-01 PROCEDURE — 99285 EMERGENCY DEPT VISIT HI MDM: CPT

## 2024-05-01 PROCEDURE — 36415 COLL VENOUS BLD VENIPUNCTURE: CPT

## 2024-05-01 PROCEDURE — 87640 STAPH A DNA AMP PROBE: CPT

## 2024-05-01 PROCEDURE — 93010 ELECTROCARDIOGRAM REPORT: CPT

## 2024-05-01 PROCEDURE — 99222 1ST HOSP IP/OBS MODERATE 55: CPT

## 2024-05-01 PROCEDURE — 97162 PT EVAL MOD COMPLEX 30 MIN: CPT | Mod: GP

## 2024-05-01 PROCEDURE — 83735 ASSAY OF MAGNESIUM: CPT

## 2024-05-01 PROCEDURE — 87641 MR-STAPH DNA AMP PROBE: CPT

## 2024-05-01 PROCEDURE — 80053 COMPREHEN METABOLIC PANEL: CPT

## 2024-05-01 PROCEDURE — 82962 GLUCOSE BLOOD TEST: CPT

## 2024-05-01 PROCEDURE — 84145 PROCALCITONIN (PCT): CPT

## 2024-05-01 RX ORDER — ATORVASTATIN CALCIUM 80 MG/1
40 TABLET, FILM COATED ORAL AT BEDTIME
Refills: 0 | Status: DISCONTINUED | OUTPATIENT
Start: 2024-05-01 | End: 2024-05-03

## 2024-05-01 RX ORDER — DEXTROSE 50 % IN WATER 50 %
25 SYRINGE (ML) INTRAVENOUS ONCE
Refills: 0 | Status: DISCONTINUED | OUTPATIENT
Start: 2024-05-01 | End: 2024-05-03

## 2024-05-01 RX ORDER — CEFTRIAXONE 500 MG/1
1000 INJECTION, POWDER, FOR SOLUTION INTRAMUSCULAR; INTRAVENOUS ONCE
Refills: 0 | Status: COMPLETED | OUTPATIENT
Start: 2024-05-01 | End: 2024-05-01

## 2024-05-01 RX ORDER — HEPARIN SODIUM 5000 [USP'U]/ML
5000 INJECTION INTRAVENOUS; SUBCUTANEOUS EVERY 8 HOURS
Refills: 0 | Status: DISCONTINUED | OUTPATIENT
Start: 2024-05-01 | End: 2024-05-03

## 2024-05-01 RX ORDER — SODIUM CHLORIDE 9 MG/ML
1000 INJECTION, SOLUTION INTRAVENOUS
Refills: 0 | Status: DISCONTINUED | OUTPATIENT
Start: 2024-05-01 | End: 2024-05-03

## 2024-05-01 RX ORDER — DEXTROSE 50 % IN WATER 50 %
15 SYRINGE (ML) INTRAVENOUS ONCE
Refills: 0 | Status: DISCONTINUED | OUTPATIENT
Start: 2024-05-01 | End: 2024-05-03

## 2024-05-01 RX ORDER — INSULIN LISPRO 100/ML
VIAL (ML) SUBCUTANEOUS
Refills: 0 | Status: DISCONTINUED | OUTPATIENT
Start: 2024-05-01 | End: 2024-05-03

## 2024-05-01 RX ORDER — GLUCAGON INJECTION, SOLUTION 0.5 MG/.1ML
1 INJECTION, SOLUTION SUBCUTANEOUS ONCE
Refills: 0 | Status: DISCONTINUED | OUTPATIENT
Start: 2024-05-01 | End: 2024-05-03

## 2024-05-01 RX ORDER — ALBUTEROL 90 UG/1
2 AEROSOL, METERED ORAL EVERY 6 HOURS
Refills: 0 | Status: DISCONTINUED | OUTPATIENT
Start: 2024-05-01 | End: 2024-05-03

## 2024-05-01 RX ORDER — ACETAMINOPHEN 500 MG
975 TABLET ORAL ONCE
Refills: 0 | Status: COMPLETED | OUTPATIENT
Start: 2024-05-01 | End: 2024-05-01

## 2024-05-01 RX ORDER — DOCUSATE SODIUM 100 MG
0 CAPSULE ORAL
Qty: 0 | Refills: 0 | DISCHARGE

## 2024-05-01 RX ORDER — IPRATROPIUM/ALBUTEROL SULFATE 18-103MCG
3 AEROSOL WITH ADAPTER (GRAM) INHALATION
Refills: 0 | Status: DISCONTINUED | OUTPATIENT
Start: 2024-05-01 | End: 2024-05-03

## 2024-05-01 RX ORDER — SENNA PLUS 8.6 MG/1
2 TABLET ORAL AT BEDTIME
Refills: 0 | Status: DISCONTINUED | OUTPATIENT
Start: 2024-05-01 | End: 2024-05-03

## 2024-05-01 RX ORDER — DEXTROSE 10 % IN WATER 10 %
125 INTRAVENOUS SOLUTION INTRAVENOUS ONCE
Refills: 0 | Status: DISCONTINUED | OUTPATIENT
Start: 2024-05-01 | End: 2024-05-03

## 2024-05-01 RX ORDER — ASPIRIN/CALCIUM CARB/MAGNESIUM 324 MG
1 TABLET ORAL
Qty: 0 | Refills: 0 | DISCHARGE

## 2024-05-01 RX ORDER — NIFEDIPINE 30 MG
60 TABLET, EXTENDED RELEASE 24 HR ORAL DAILY
Refills: 0 | Status: DISCONTINUED | OUTPATIENT
Start: 2024-05-02 | End: 2024-05-03

## 2024-05-01 RX ORDER — GABAPENTIN 400 MG/1
0 CAPSULE ORAL
Qty: 0 | Refills: 0 | DISCHARGE

## 2024-05-01 RX ORDER — MEMANTINE HYDROCHLORIDE 10 MG/1
10 TABLET ORAL
Refills: 0 | Status: DISCONTINUED | OUTPATIENT
Start: 2024-05-01 | End: 2024-05-03

## 2024-05-01 RX ORDER — METFORMIN HYDROCHLORIDE 850 MG/1
1 TABLET ORAL
Qty: 0 | Refills: 0 | DISCHARGE

## 2024-05-01 RX ORDER — BUDESONIDE AND FORMOTEROL FUMARATE DIHYDRATE 160; 4.5 UG/1; UG/1
2 AEROSOL RESPIRATORY (INHALATION)
Refills: 0 | Status: DISCONTINUED | OUTPATIENT
Start: 2024-05-01 | End: 2024-05-03

## 2024-05-01 RX ORDER — MEMANTINE HYDROCHLORIDE 10 MG/1
1 TABLET ORAL
Qty: 0 | Refills: 0 | DISCHARGE

## 2024-05-01 RX ORDER — AZITHROMYCIN 500 MG/1
500 TABLET, FILM COATED ORAL ONCE
Refills: 0 | Status: COMPLETED | OUTPATIENT
Start: 2024-05-01 | End: 2024-05-01

## 2024-05-01 RX ORDER — GABAPENTIN 400 MG/1
100 CAPSULE ORAL AT BEDTIME
Refills: 0 | Status: DISCONTINUED | OUTPATIENT
Start: 2024-05-01 | End: 2024-05-03

## 2024-05-01 RX ORDER — ROSUVASTATIN CALCIUM 5 MG/1
1 TABLET ORAL
Qty: 0 | Refills: 0 | DISCHARGE

## 2024-05-01 RX ORDER — AZITHROMYCIN 500 MG/1
500 TABLET, FILM COATED ORAL EVERY 24 HOURS
Refills: 0 | Status: DISCONTINUED | OUTPATIENT
Start: 2024-05-02 | End: 2024-05-03

## 2024-05-01 RX ORDER — DEXTROSE 50 % IN WATER 50 %
12.5 SYRINGE (ML) INTRAVENOUS ONCE
Refills: 0 | Status: DISCONTINUED | OUTPATIENT
Start: 2024-05-01 | End: 2024-05-03

## 2024-05-01 RX ORDER — ACETAMINOPHEN 500 MG
650 TABLET ORAL EVERY 6 HOURS
Refills: 0 | Status: DISCONTINUED | OUTPATIENT
Start: 2024-05-01 | End: 2024-05-03

## 2024-05-01 RX ORDER — FLUTICASONE FUROATE AND VILANTEROL TRIFENATATE 100; 25 UG/1; UG/1
1 POWDER RESPIRATORY (INHALATION)
Qty: 0 | Refills: 0 | DISCHARGE

## 2024-05-01 RX ORDER — SODIUM CHLORIDE 9 MG/ML
2500 INJECTION INTRAMUSCULAR; INTRAVENOUS; SUBCUTANEOUS ONCE
Refills: 0 | Status: COMPLETED | OUTPATIENT
Start: 2024-05-01 | End: 2024-05-01

## 2024-05-01 RX ORDER — IPRATROPIUM/ALBUTEROL SULFATE 18-103MCG
3 AEROSOL WITH ADAPTER (GRAM) INHALATION EVERY 6 HOURS
Refills: 0 | Status: DISCONTINUED | OUTPATIENT
Start: 2024-05-01 | End: 2024-05-03

## 2024-05-01 RX ORDER — CEFTRIAXONE 500 MG/1
1000 INJECTION, POWDER, FOR SOLUTION INTRAMUSCULAR; INTRAVENOUS EVERY 24 HOURS
Refills: 0 | Status: DISCONTINUED | OUTPATIENT
Start: 2024-05-02 | End: 2024-05-03

## 2024-05-01 RX ADMIN — Medication 125 MILLIGRAM(S): at 10:48

## 2024-05-01 RX ADMIN — SODIUM CHLORIDE 100 MILLILITER(S): 9 INJECTION, SOLUTION INTRAVENOUS at 17:16

## 2024-05-01 RX ADMIN — HEPARIN SODIUM 5000 UNIT(S): 5000 INJECTION INTRAVENOUS; SUBCUTANEOUS at 21:04

## 2024-05-01 RX ADMIN — GABAPENTIN 100 MILLIGRAM(S): 400 CAPSULE ORAL at 21:04

## 2024-05-01 RX ADMIN — ATORVASTATIN CALCIUM 40 MILLIGRAM(S): 80 TABLET, FILM COATED ORAL at 21:04

## 2024-05-01 RX ADMIN — AZITHROMYCIN 255 MILLIGRAM(S): 500 TABLET, FILM COATED ORAL at 10:48

## 2024-05-01 RX ADMIN — Medication 3: at 17:15

## 2024-05-01 RX ADMIN — Medication 975 MILLIGRAM(S): at 09:51

## 2024-05-01 RX ADMIN — Medication 3 MILLILITER(S): at 10:48

## 2024-05-01 RX ADMIN — Medication 30 MILLIGRAM(S): at 18:07

## 2024-05-01 RX ADMIN — Medication 3 MILLILITER(S): at 09:30

## 2024-05-01 RX ADMIN — CEFTRIAXONE 100 MILLIGRAM(S): 500 INJECTION, POWDER, FOR SOLUTION INTRAMUSCULAR; INTRAVENOUS at 10:24

## 2024-05-01 RX ADMIN — SODIUM CHLORIDE 2500 MILLILITER(S): 9 INJECTION INTRAMUSCULAR; INTRAVENOUS; SUBCUTANEOUS at 10:24

## 2024-05-01 NOTE — PATIENT PROFILE ADULT - FALL HARM RISK - HARM RISK INTERVENTIONS
Assistance with ambulation/Assistance OOB with selected safe patient handling equipment/Communicate Risk of Fall with Harm to all staff/Discuss with provider need for PT consult/Monitor gait and stability/Provide patient with walking aids - walker, cane, crutches/Reinforce activity limits and safety measures with patient and family/Tailored Fall Risk Interventions/Use of alarms - bed, chair and/or voice tab/Visual Cue: Yellow wristband and red socks/Bed in lowest position, wheels locked, appropriate side rails in place/Call bell, personal items and telephone in reach/Instruct patient to call for assistance before getting out of bed or chair/Non-slip footwear when patient is out of bed/[place] to call system/Physically safe environment - no spills, clutter or unnecessary equipment/Purposeful Proactive Rounding/Room/bathroom lighting operational, light cord in reach

## 2024-05-01 NOTE — ED ADULT TRIAGE NOTE - CHIEF COMPLAINT QUOTE
pt BIBA for shortness of breath and fever Ftsg Text: The defect edges were debeveled with a #15 scalpel blade.  Given the location of the defect, shape of the defect and the proximity to free margins a full thickness skin graft was deemed most appropriate.  Using a sterile surgical marker, the primary defect shape was transferred to the donor site. The area thus outlined was incised deep to adipose tissue with a #15 scalpel blade.  The harvested graft was then trimmed of adipose tissue until only dermis and epidermis was left.  The skin margins of the secondary defect were undermined to an appropriate distance in all directions utilizing iris scissors.  The secondary defect was closed with interrupted buried subcutaneous sutures.  The skin edges were then re-apposed with running  sutures.  The skin graft was then placed in the primary defect and oriented appropriately.

## 2024-05-01 NOTE — PATIENT PROFILE ADULT - NSPRESCRUSEDDRG_GEN_A_NUR
Noted provider reviewed/doned encounter. Patient attended appt today; appears no DEXA ordered at this time.  Will send letter.    =======================================================   For Pharmacy Admin Tracking Only    Program: [address] in place:  No  Recommendation Provided To: Provider: 1 via Note to Provider  Intervention Detail: Lab(s) Ordered  Intervention Accepted By: Provider: 0  Gap Closed?: No   Time Spent (min): 15
DVT ppx -Lovenox sc
DVT ppx -Lovenox sc
No

## 2024-05-01 NOTE — ED PROVIDER NOTE - PHYSICAL EXAMINATION
CONSTITUTIONAL: in no apparent distress.   HEAD: Normocephalic; atraumatic.   RESPIRATORY: No signs of respiratory distress. Decreased lung sound noticed in all lobes.  CARDIOVASCULAR: Regular rate and rhythm.   GI: Abdomen is soft, non-tender, and without distention. Bowel sounds are present and normoactive in all four quadrants. No masses are noted.   MS: Normal appearance and ROM in all four extremities. No tenderness to palpation and distal pulses are normal. Sensation to the upper and lower extremities is normal bilaterally. ***Steady gait noted.  NEURO: A & O x 2

## 2024-05-01 NOTE — ED PROVIDER NOTE - CLINICAL SUMMARY MEDICAL DECISION MAKING FREE TEXT BOX
On physical exam patient is normocephalic atraumatic pupils equal round reactive light accommodation extraocular muscles intact oropharynx clear chest clear to auscultation bilaterally abdomen soft nontender nondistended bowel sounds positive no guarding or rebound extremities full range of motion pedal pulse 2+ radial pulse 2+ patient at baseline mental status per daughter    Assessment plan patient presents for evaluation of fever given history of frequent urinary tract infections the fact that the patient is incontinent most likely UTI given IV antibiotics IV fluids furthermore we obtained x-ray per my independent evaluation is consistent with opacity we obtain EKG per minute and evaluation consistent with sinus tachycardia not consistent with STEMI QT prolongation or arrhythmia

## 2024-05-01 NOTE — H&P ADULT - ASSESSMENT
A 83 year-old female with a past medical history of hyperlipidemia, diabetes, dementia, COPD on 2 L  oxygen, and hypertension who was brought in by daughter for fever and shortness of breath.     #acute respiratory failure 2/2 PNA  #sepsis POA  #COPD exacerbation , on 2 L at home  cxr Left basilar/retrocardiac haziness, cannot exclude opacity.  s/p 4 L bolus, cw IV hydration   -IV abx   -s/p solumedrol   -follow up bcx  -pulse ox   -cw oxygen   -procalcitonin   -duonebs  -cw home inhalers   -MRSA  -urine strep, legionella  -pulmonology consult     #UTI  UA positive  follow up ucx  IV abx   monitor for fever      #KORI  cr 1.8 , baseline 1.2  -gentle IV hydration   -avoid nephrotoxic meds    #Anemia likely dilutional   no bleeding   hgb 9.3 (before 11)     #hx DM   -hold PO meds  -ISS  -monitor FS    #HTN  cw nifedipine    #HLD  cw statin     #dementia  cw memantine     DVT prophylaxis -HSQ  GI prophylaxis        A 83 year-old female with a past medical history of hyperlipidemia, diabetes, dementia, COPD on 2 L  oxygen, and hypertension who was brought in by daughter for fever and shortness of breath.     #acute respiratory failure 2/2 PNA  #sepsis POA  #hx COPD exacerbation , on 2 L at home  cxr Left basilar/retrocardiac haziness, cannot exclude opacity.  s/p 2.5 L bolus, cw IV hydration 100 LR  -IV abx   -s/p solumedrol 125mg  -follow up bcx  -pulse ox   -cw oxygen   -procalcitonin   -duonebs  -cw home inhalers   -MRSA  -urine strep, legionella      #UTI  UA positive  follow up ucx  IV abx   monitor for fever      #KORI  cr 1.8 , baseline 1.2  -gentle IV hydration   -avoid nephrotoxic meds    #Anemia likely dilutional   no bleeding   hgb 9.3 (before 11)     #hx DM   -hold PO meds  -ISS  -monitor FS    #HTN  cw nifedipine    #HLD  cw statin     #dementia  cw memantine     DVT prophylaxis -HSQ  CODE status FULL

## 2024-05-01 NOTE — ED PROVIDER NOTE - ATTENDING APP SHARED VISIT CONTRIBUTION OF CARE
I have personally performed a history and physical exam on this patient and personally directed the management of the patient.  patient is an 83-year-old female presents for evaluation of worsening shortness of breath past medical history of hyperlipidemia diabetes dementia COPD on oxygen as well as hypertension brought in for further evaluation patient cannot contribute to exam history is corroborated by the patient's daughter of note patient has fever history of prior UTIs as well    On physical exam patient is normocephalic atraumatic pupils equal round reactive light accommodation extraocular muscles intact oropharynx clear chest clear to auscultation bilaterally abdomen soft nontender nondistended bowel sounds positive no guarding or rebound extremities full range of motion pedal pulse 2+ radial pulse 2+ patient at baseline mental status per daughter    Assessment plan patient presents for evaluation of fever given history of frequent urinary tract infections the fact that the patient is incontinent most likely UTI given IV antibiotics IV fluids furthermore we obtained x-ray per my independent evaluation is consistent with opacity we obtain EKG per minute and evaluation consistent with sinus tachycardia not consistent with STEMI QT prolongation or arrhythmia

## 2024-05-01 NOTE — ED PROVIDER NOTE - PROGRESS NOTE DETAILS
Patient is admitted for PNA and UTI and hypoxia. Pt is aware of the plan and agrees. Pt has been endorsed to hospitalist.

## 2024-05-01 NOTE — H&P ADULT - HISTORY OF PRESENT ILLNESS
A 83 year-old female with a past medical history of hyperlipidemia, diabetes, dementia, COPD on 2 L  oxygen, and hypertension who was brought in by daughter for fever and shortness of breath. Pt poor historian due to dementia,  Per daughter, patient has been having shortness of breath and a fever since last nigh Tmax  100.5, Daughter denies cough or wheezing.  NO coughing with eating. Decreased PO intake for past few days.  No chest pain. Per daughter no urinary symptoms. Pt with minimal ambulation at baseline, out of bed to chair. No sick contact. NO recent traveling.      A 83 year-old female with a past medical history of hyperlipidemia, diabetes, dementia usual a&ox1, COPD on 2 L  oxygen, and hypertension who was brought in by daughter for fever and shortness of breath. Pt poor historian due to dementia,  Per daughter, patient has been having shortness of breath and a fever since last nigh Tmax  100.5, Daughter denies cough or wheezing.  NO coughing with eating. Decreased PO intake for past few days.  No chest pain. Per daughter no urinary symptoms. Pt with minimal ambulation at baseline, out of bed to chair. No sick contact. NO recent traveling.

## 2024-05-01 NOTE — ED PROVIDER NOTE - OBJECTIVE STATEMENT
83-year-old female with a past medical history of hyperlipidemia, diabetes, dementia, COPD on oxygen, and hypertension who was brought in by daughter for evaluation.  Per daughter, patient has been having shortness of breath and a fever since last night and the symptoms did not improve, so patient was brought to the ED for evaluation.  When EMS was called on scene, they also found that patient was hypoxic.  Denies chest pain, nausea, vomiting, abdominal pain, urinary symptoms, and change with bowel movement.

## 2024-05-01 NOTE — H&P ADULT - NSHPLABSRESULTS_GEN_ALL_CORE
9.3    7.84  )-----------( 366      ( 01 May 2024 10:09 )             29.6           143  |  109  |  31<H>  ----------------------------<  169<H>  4.2   |  16<L>  |  1.8<H>    Ca    9.1      01 May 2024 10:09    TPro  6.8  /  Alb  3.3<L>  /  TBili  0.3  /  DBili  x   /  AST  34  /  ALT  21  /  AlkPhos  77                    Urinalysis Basic - ( 01 May 2024 10:40 )    Color: Yellow / Appearance: Turbid / S.013 / pH: x  Gluc: x / Ketone: Negative mg/dL  / Bili: Negative / Urobili: 0.2 mg/dL   Blood: x / Protein: 100 mg/dL / Nitrite: Positive   Leuk Esterase: Large / RBC: 10 /HPF / WBC 47 /HPF   Sq Epi: x / Non Sq Epi: 2 /HPF / Bacteria: Moderate /HPF        PT/INR - ( 01 May 2024 10:09 )   PT: 12.80 sec;   INR: 1.12 ratio         PTT - ( 01 May 2024 10:09 )  PTT:27.4 sec    Lactate Trend   @ 10:09 Lactate:1.5     < from: Xray Chest 1 View- PORTABLE-Urgent (24 @ 10:32) >    Impression:    Left basilar/retrocardiac haziness, cannot exclude opacity.    --- End of Report ---      LILLI SCHOFIELD MD; Attending Radiologist  This document has been electronically signed. May  1 2024 11:39AM    < end of copied text >

## 2024-05-01 NOTE — ED ADULT NURSE NOTE - NSFALLRISKINTERV_ED_ALL_ED
Assistance OOB with selected safe patient handling equipment if applicable/Assistance with ambulation/Communicate fall risk and risk factors to all staff, patient, and family/Monitor gait and stability/Provide visual cue: yellow wristband, yellow gown, etc/Reinforce activity limits and safety measures with patient and family/Call bell, personal items and telephone in reach/Instruct patient to call for assistance before getting out of bed/chair/stretcher/Non-slip footwear applied when patient is off stretcher/[place] to call system/Physically safe environment - no spills, clutter or unnecessary equipment/Purposeful Proactive Rounding/Room/bathroom lighting operational, light cord in reach

## 2024-05-01 NOTE — H&P ADULT - NS ATTEND AMEND GEN_ALL_CORE FT
A 83 year-old female with a past medical history of hyperlipidemia, diabetes, dementia, COPD on 2 L  oxygen, and hypertension who was brought in by daughter for fever and shortness of breath.     #Shortness of breath 2/2 PNA-community acquired   #sepsis POA  #hx COPD exacerbation , on 2 L at home  cxr Left basilar/retrocardiac haziness, cannot exclude opacity.  s/p 2.5 L bolus, cw IV hydration 100 LR  -IV abx   -s/p solumedrol 125mg  -follow up bcx  -pulse ox   -cw oxygen   -procalcitonin   -duonebs  -cw home inhalers   -MRSA  -urine strep, legionella      #UTI  UA positive  follow up ucx  IV abx   monitor for fever      #KORI  cr 1.8 , baseline 1.2  -gentle IV hydration   -avoid nephrotoxic meds    #Anemia likely dilutional   no bleeding   hgb 9.3 (before 11)     #hx DM   -hold PO meds  -ISS  -monitor FS    #HTN  cw nifedipine    #HLD  cw statin     #dementia  cw memantine     DVT prophylaxis -HSQ  CODE status FULL  Family: daughter updated at bedside - she will further consider dnr/dni status  Pending: procal, blood cultures, urine culture, leg, mrsa strep, oxygen requirement

## 2024-05-01 NOTE — H&P ADULT - NSHPSOCIALHISTORY_GEN_ALL_CORE
denies smoking or drinking stopped smoking >15yrs ago  rare alcohol    Mother  in her 40s of stomach cancer  Father  in his 80s unknown cause

## 2024-05-01 NOTE — H&P ADULT - NSHPPHYSICALEXAM_GEN_ALL_CORE
VITALS:   ICU Vital Signs Last 24 Hrs  T(C): 37.2 (01 May 2024 12:33), Max: 39.6 (01 May 2024 09:39)  T(F): 98.9 (01 May 2024 12:33), Max: 103.2 (01 May 2024 09:39)  HR: 104 (01 May 2024 12:33) (104 - 128)  BP: 124/58 (01 May 2024 12:33) (124/58 - 145/68)  RR: 18 (01 May 2024 12:33) (18 - 20)  SpO2: 94% (01 May 2024 12:33) (89% - 94%)    O2 Parameters below as of 01 May 2024 12:33  Patient On (Oxygen Delivery Method): nasal cannula  O2 Flow (L/min): 3      GENERAL: NAD, lying in bed comfortably  HEAD:  Atraumatic, Normocephalic  EYES: EOMI, PERRLA, conjunctiva and sclera clear  ENT: Moist mucous membranes  NECK: Supple, No JVD  CHEST/LUNG: Breath sounds present bilaterally ,  No rales, wheezing, or rubs. Unlabored respirations, saturating 97 on 3 L NC  HEART: Regular rate and rhythm HR 97; No murmurs, rubs, or gallops  ABDOMEN: obese, Soft, Nontender, Nondistended. No hepatomegally  EXTREMITIES:  no edema or tenderness   NERVOUS SYSTEM:  Awake, Alert  x 1, able to follow simple commands.   MSK:  limited range of motion of LE   SKIN: No rashes or lesions

## 2024-05-01 NOTE — ED PROVIDER NOTE - CARE PLAN
Principal Discharge DX:	PNA (pneumonia)  Secondary Diagnosis:	Hypoxia  Secondary Diagnosis:	Acute UTI   1

## 2024-05-02 LAB
ALBUMIN SERPL ELPH-MCNC: 2.9 G/DL — LOW (ref 3.5–5.2)
ALP SERPL-CCNC: 72 U/L — SIGNIFICANT CHANGE UP (ref 30–115)
ALT FLD-CCNC: 26 U/L — SIGNIFICANT CHANGE UP (ref 0–41)
ANION GAP SERPL CALC-SCNC: 14 MMOL/L — SIGNIFICANT CHANGE UP (ref 7–14)
AST SERPL-CCNC: 34 U/L — SIGNIFICANT CHANGE UP (ref 0–41)
BASOPHILS # BLD AUTO: 0.01 K/UL — SIGNIFICANT CHANGE UP (ref 0–0.2)
BASOPHILS NFR BLD AUTO: 0.1 % — SIGNIFICANT CHANGE UP (ref 0–1)
BILIRUB SERPL-MCNC: <0.2 MG/DL — SIGNIFICANT CHANGE UP (ref 0.2–1.2)
BUN SERPL-MCNC: 27 MG/DL — HIGH (ref 10–20)
CALCIUM SERPL-MCNC: 8.5 MG/DL — SIGNIFICANT CHANGE UP (ref 8.4–10.5)
CHLORIDE SERPL-SCNC: 108 MMOL/L — SIGNIFICANT CHANGE UP (ref 98–110)
CO2 SERPL-SCNC: 19 MMOL/L — SIGNIFICANT CHANGE UP (ref 17–32)
CREAT SERPL-MCNC: 1.3 MG/DL — SIGNIFICANT CHANGE UP (ref 0.7–1.5)
EGFR: 40 ML/MIN/1.73M2 — LOW
EOSINOPHIL # BLD AUTO: 0 K/UL — SIGNIFICANT CHANGE UP (ref 0–0.7)
EOSINOPHIL NFR BLD AUTO: 0 % — SIGNIFICANT CHANGE UP (ref 0–8)
GLUCOSE BLDC GLUCOMTR-MCNC: 168 MG/DL — HIGH (ref 70–99)
GLUCOSE BLDC GLUCOMTR-MCNC: 176 MG/DL — HIGH (ref 70–99)
GLUCOSE SERPL-MCNC: 196 MG/DL — HIGH (ref 70–99)
HCT VFR BLD CALC: 26.7 % — LOW (ref 37–47)
HGB BLD-MCNC: 8.2 G/DL — LOW (ref 12–16)
IMM GRANULOCYTES NFR BLD AUTO: 0.5 % — HIGH (ref 0.1–0.3)
LYMPHOCYTES # BLD AUTO: 0.59 K/UL — LOW (ref 1.2–3.4)
LYMPHOCYTES # BLD AUTO: 5.4 % — LOW (ref 20.5–51.1)
MCHC RBC-ENTMCNC: 25.4 PG — LOW (ref 27–31)
MCHC RBC-ENTMCNC: 30.7 G/DL — LOW (ref 32–37)
MCV RBC AUTO: 82.7 FL — SIGNIFICANT CHANGE UP (ref 81–99)
MONOCYTES # BLD AUTO: 0.52 K/UL — SIGNIFICANT CHANGE UP (ref 0.1–0.6)
MONOCYTES NFR BLD AUTO: 4.8 % — SIGNIFICANT CHANGE UP (ref 1.7–9.3)
NEUTROPHILS # BLD AUTO: 9.67 K/UL — HIGH (ref 1.4–6.5)
NEUTROPHILS NFR BLD AUTO: 89.2 % — HIGH (ref 42.2–75.2)
NRBC # BLD: 0 /100 WBCS — SIGNIFICANT CHANGE UP (ref 0–0)
PLATELET # BLD AUTO: 359 K/UL — SIGNIFICANT CHANGE UP (ref 130–400)
PMV BLD: 9.9 FL — SIGNIFICANT CHANGE UP (ref 7.4–10.4)
POTASSIUM SERPL-MCNC: 4 MMOL/L — SIGNIFICANT CHANGE UP (ref 3.5–5)
POTASSIUM SERPL-SCNC: 4 MMOL/L — SIGNIFICANT CHANGE UP (ref 3.5–5)
PROCALCITONIN SERPL-MCNC: 8.56 NG/ML — HIGH (ref 0.02–0.1)
PROT SERPL-MCNC: 6.2 G/DL — SIGNIFICANT CHANGE UP (ref 6–8)
RBC # BLD: 3.23 M/UL — LOW (ref 4.2–5.4)
RBC # FLD: 15.9 % — HIGH (ref 11.5–14.5)
SODIUM SERPL-SCNC: 141 MMOL/L — SIGNIFICANT CHANGE UP (ref 135–146)
WBC # BLD: 10.84 K/UL — HIGH (ref 4.8–10.8)
WBC # FLD AUTO: 10.84 K/UL — HIGH (ref 4.8–10.8)

## 2024-05-02 PROCEDURE — 99232 SBSQ HOSP IP/OBS MODERATE 35: CPT

## 2024-05-02 RX ADMIN — MEMANTINE HYDROCHLORIDE 10 MILLIGRAM(S): 10 TABLET ORAL at 05:36

## 2024-05-02 RX ADMIN — SODIUM CHLORIDE 100 MILLILITER(S): 9 INJECTION, SOLUTION INTRAVENOUS at 21:13

## 2024-05-02 RX ADMIN — Medication 30 MILLIGRAM(S): at 11:35

## 2024-05-02 RX ADMIN — CEFTRIAXONE 100 MILLIGRAM(S): 500 INJECTION, POWDER, FOR SOLUTION INTRAMUSCULAR; INTRAVENOUS at 11:32

## 2024-05-02 RX ADMIN — Medication 1: at 08:35

## 2024-05-02 RX ADMIN — HEPARIN SODIUM 5000 UNIT(S): 5000 INJECTION INTRAVENOUS; SUBCUTANEOUS at 14:26

## 2024-05-02 RX ADMIN — MEMANTINE HYDROCHLORIDE 10 MILLIGRAM(S): 10 TABLET ORAL at 17:44

## 2024-05-02 RX ADMIN — BUDESONIDE AND FORMOTEROL FUMARATE DIHYDRATE 2 PUFF(S): 160; 4.5 AEROSOL RESPIRATORY (INHALATION) at 20:33

## 2024-05-02 RX ADMIN — BUDESONIDE AND FORMOTEROL FUMARATE DIHYDRATE 2 PUFF(S): 160; 4.5 AEROSOL RESPIRATORY (INHALATION) at 08:56

## 2024-05-02 RX ADMIN — ATORVASTATIN CALCIUM 40 MILLIGRAM(S): 80 TABLET, FILM COATED ORAL at 21:13

## 2024-05-02 RX ADMIN — AZITHROMYCIN 255 MILLIGRAM(S): 500 TABLET, FILM COATED ORAL at 11:33

## 2024-05-02 RX ADMIN — Medication 1: at 16:55

## 2024-05-02 RX ADMIN — Medication 60 MILLIGRAM(S): at 05:37

## 2024-05-02 RX ADMIN — HEPARIN SODIUM 5000 UNIT(S): 5000 INJECTION INTRAVENOUS; SUBCUTANEOUS at 05:36

## 2024-05-02 RX ADMIN — GABAPENTIN 100 MILLIGRAM(S): 400 CAPSULE ORAL at 21:13

## 2024-05-02 RX ADMIN — HEPARIN SODIUM 5000 UNIT(S): 5000 INJECTION INTRAVENOUS; SUBCUTANEOUS at 21:13

## 2024-05-02 NOTE — PHYSICAL THERAPY INITIAL EVALUATION ADULT - ADDITIONAL COMMENTS
as per grand daughter at Bedside PT lives W/ Daughter and grand daughter  W/ No Steps to enter, 5 steps to living W/ RT HR and 5  Steps to bedroom W/ LT HR , s per Grand daughter pt requires physical assistance W/ All functional activity PTA  depend on day, doesn't use AD at home

## 2024-05-02 NOTE — PHYSICAL THERAPY INITIAL EVALUATION ADULT - GENERAL OBSERVATIONS, REHAB EVAL
10:05 - 10:30 Chart reviewed. Order received.  Patient is ok to be  seen for Pt, confirmed with RN. pt encountered  Semi owens in the bed   Non  Verbal  indicator of pain is absent , and agrees to participate in session, +  Heplock , +  Prima fit  / O2 2.5 LPM via NC  ,NAD.  Rn aware , Grand daughter at bedside

## 2024-05-02 NOTE — PHYSICAL THERAPY INITIAL EVALUATION ADULT - PERTINENT HX OF CURRENT PROBLEM, REHAB EVAL
83 year-old female with a past medical history of hyperlipidemia, diabetes, dementia usual a&ox1, COPD on 2 L  oxygen, and hypertension who was brought in by daughter for fever and shortness of breath. Pt poor historian due to dementia,  Per daughter, patient has been having shortness of breath and a fever since last nigh Tmax  100.5, Daughter denies cough or wheezing.  NO coughing with eating. Decreased PO intake for past few days.  No chest pain. Per daughter no urinary symptoms. Pt with minimal ambulation at baseline, out of bed to chair. No sick contact. NO recent traveling.

## 2024-05-02 NOTE — PROGRESS NOTE ADULT - ASSESSMENT
83 year-old female with a past medical history of hyperlipidemia, diabetes, dementia, COPD on 2 L  oxygen, and hypertension who was brought in by daughter for fever and shortness of breath.     #Shortness of breath 2/2 PNA-community acquired   #sepsis POA  #hx COPD exacerbation , on 2 L at home  -on ceftriaxone and azithro IV - plan to switch to po 5/3 if clinically indicated   -follow up bcx  -titrate down O2 as appropriate   -procalcitonin - 8.56 on admission, will trend   -duonebs  -cw home inhalers   -MRSA  -urine strep, legionella      #UTI  UA positive  follow up ucx  continue antibiotics   monitor for fever      #KORI-resolved   cr 1.8-->1.3 , baseline 1.2  -avoid nephrotoxic meds    #Anemia likely dilutional   no bleeding   hgb 9.3 (before 11)     #hx DM   -hold PO meds  -ISS  -monitor FS    #HTN  cw nifedipine    #HLD  cw statin     #dementia  cw memantine     DVT prophylaxis -HSQ  CODE status FULL  Family: grand daughter updated at bedside 5/2  Pending: procal, blood cultures, urine culture, leg, mrsa strep, oxygen requirement.

## 2024-05-02 NOTE — PROGRESS NOTE ADULT - SUBJECTIVE AND OBJECTIVE BOX
Patient is a 83y old  Female who presents with a chief complaint of hypoxia, fever, PNA, UTI, KORI (01 May 2024 13:50)      Patient seen and examined at bedside.  Patient denies any pain in her body   ALLERGIES:  No Known Allergies    MEDICATIONS:  acetaminophen     Tablet .. 650 milliGRAM(s) Oral every 6 hours PRN  albuterol    90 MICROgram(s) HFA Inhaler 2 Puff(s) Inhalation every 6 hours PRN  albuterol/ipratropium for Nebulization 3 milliLiter(s) Nebulizer every 6 hours PRN  albuterol/ipratropium for Nebulization.. 3 milliLiter(s) Nebulizer every 20 minutes  atorvastatin 40 milliGRAM(s) Oral at bedtime  azithromycin  IVPB 500 milliGRAM(s) IV Intermittent every 24 hours  budesonide 160 MICROgram(s)/formoterol 4.5 MICROgram(s) Inhaler 2 Puff(s) Inhalation two times a day  cefTRIAXone   IVPB 1000 milliGRAM(s) IV Intermittent every 24 hours  dextrose 10% Bolus 125 milliLiter(s) IV Bolus once  dextrose 5%. 1000 milliLiter(s) IV Continuous <Continuous>  dextrose 5%. 1000 milliLiter(s) IV Continuous <Continuous>  dextrose 50% Injectable 25 Gram(s) IV Push once  dextrose 50% Injectable 12.5 Gram(s) IV Push once  dextrose Oral Gel 15 Gram(s) Oral once PRN  gabapentin 100 milliGRAM(s) Oral at bedtime  glucagon  Injectable 1 milliGRAM(s) IntraMuscular once  heparin   Injectable 5000 Unit(s) SubCutaneous every 8 hours  insulin lispro (ADMELOG) corrective regimen sliding scale   SubCutaneous three times a day before meals  lactated ringers. 1000 milliLiter(s) IV Continuous <Continuous>  memantine 10 milliGRAM(s) Oral two times a day  NIFEdipine XL 60 milliGRAM(s) Oral daily  PARoxetine 30 milliGRAM(s) Oral daily  senna 2 Tablet(s) Oral at bedtime PRN    Vital Signs Last 24 Hrs  T(F): 96.8 (02 May 2024 15:10), Max: 97.4 (02 May 2024 05:02)  HR: 75 (02 May 2024 15:10) (74 - 75)  BP: 116/76 (02 May 2024 15:10) (116/76 - 135/60)  RR: 18 (02 May 2024 08:16) (18 - 18)  SpO2: 97% (02 May 2024 15:10) (92% - 97%)  I&O's Summary    01 May 2024 07:01  -  02 May 2024 07:00  --------------------------------------------------------  IN: 0 mL / OUT: 350 mL / NET: -350 mL        PHYSICAL EXAM:  General: NAD, Alert, refused to answer any questions today   ENT: MMM  Neck: Supple, No JVD  Lungs: diminished throughout   Cardio: RRR, S1/S2, 2/6 systolic murmur   Abdomen: Soft, Nontender, Nondistended; Bowel sounds present  Extremities: No cyanosis, No edema    LABS:                        8.2    10.84 )-----------( 359      ( 02 May 2024 06:12 )             26.7     05-02    141  |  108  |  27  ----------------------------<  196  4.0   |  19  |  1.3    Ca    8.5      02 May 2024 06:12    TPro  6.2  /  Alb  2.9  /  TBili  <0.2  /  DBili  x   /  AST  34  /  ALT  26  /  AlkPhos  72  05-02      PT/INR - ( 01 May 2024 10:09 )   PT: 12.80 sec;   INR: 1.12 ratio         PTT - ( 01 May 2024 10:09 )  PTT:27.4 sec  Lactate, Blood: 1.5 mmol/L (05-01 @ 10:09)                      POCT Blood Glucose.: 176 mg/dL (02 May 2024 16:31)  POCT Blood Glucose.: 200 mg/dL (02 May 2024 11:53)  POCT Blood Glucose.: 175 mg/dL (02 May 2024 07:55)      Urinalysis Basic - ( 02 May 2024 06:12 )    Color: x / Appearance: x / SG: x / pH: x  Gluc: 196 mg/dL / Ketone: x  / Bili: x / Urobili: x   Blood: x / Protein: x / Nitrite: x   Leuk Esterase: x / RBC: x / WBC x   Sq Epi: x / Non Sq Epi: x / Bacteria: x            RADIOLOGY & ADDITIONAL TESTS:    Care Discussed with Consultants/Other Providers:

## 2024-05-03 ENCOUNTER — TRANSCRIPTION ENCOUNTER (OUTPATIENT)
Age: 84
End: 2024-05-03

## 2024-05-03 VITALS
TEMPERATURE: 97 F | RESPIRATION RATE: 16 BRPM | OXYGEN SATURATION: 96 % | DIASTOLIC BLOOD PRESSURE: 72 MMHG | SYSTOLIC BLOOD PRESSURE: 151 MMHG | HEART RATE: 90 BPM

## 2024-05-03 LAB
ALBUMIN SERPL ELPH-MCNC: 3.1 G/DL — LOW (ref 3.5–5.2)
ALP SERPL-CCNC: 76 U/L — SIGNIFICANT CHANGE UP (ref 30–115)
ALT FLD-CCNC: 35 U/L — SIGNIFICANT CHANGE UP (ref 0–41)
ANION GAP SERPL CALC-SCNC: 14 MMOL/L — SIGNIFICANT CHANGE UP (ref 7–14)
AST SERPL-CCNC: 33 U/L — SIGNIFICANT CHANGE UP (ref 0–41)
BASOPHILS # BLD AUTO: 0.02 K/UL — SIGNIFICANT CHANGE UP (ref 0–0.2)
BASOPHILS NFR BLD AUTO: 0.2 % — SIGNIFICANT CHANGE UP (ref 0–1)
BILIRUB SERPL-MCNC: <0.2 MG/DL — SIGNIFICANT CHANGE UP (ref 0.2–1.2)
BUN SERPL-MCNC: 25 MG/DL — HIGH (ref 10–20)
CALCIUM SERPL-MCNC: 9 MG/DL — SIGNIFICANT CHANGE UP (ref 8.4–10.5)
CHLORIDE SERPL-SCNC: 109 MMOL/L — SIGNIFICANT CHANGE UP (ref 98–110)
CO2 SERPL-SCNC: 20 MMOL/L — SIGNIFICANT CHANGE UP (ref 17–32)
CREAT SERPL-MCNC: 1.2 MG/DL — SIGNIFICANT CHANGE UP (ref 0.7–1.5)
CULTURE RESULTS: SIGNIFICANT CHANGE UP
EGFR: 44 ML/MIN/1.73M2 — LOW
EOSINOPHIL # BLD AUTO: 0.03 K/UL — SIGNIFICANT CHANGE UP (ref 0–0.7)
EOSINOPHIL NFR BLD AUTO: 0.3 % — SIGNIFICANT CHANGE UP (ref 0–8)
GLUCOSE BLDC GLUCOMTR-MCNC: 145 MG/DL — HIGH (ref 70–99)
GLUCOSE BLDC GLUCOMTR-MCNC: 163 MG/DL — HIGH (ref 70–99)
GLUCOSE BLDC GLUCOMTR-MCNC: 165 MG/DL — HIGH (ref 70–99)
GLUCOSE SERPL-MCNC: 159 MG/DL — HIGH (ref 70–99)
HCT VFR BLD CALC: 28 % — LOW (ref 37–47)
HGB BLD-MCNC: 9 G/DL — LOW (ref 12–16)
IMM GRANULOCYTES NFR BLD AUTO: 0.5 % — HIGH (ref 0.1–0.3)
LYMPHOCYTES # BLD AUTO: 0.64 K/UL — LOW (ref 1.2–3.4)
LYMPHOCYTES # BLD AUTO: 5.8 % — LOW (ref 20.5–51.1)
MAGNESIUM SERPL-MCNC: 1.6 MG/DL — LOW (ref 1.8–2.4)
MCHC RBC-ENTMCNC: 25.6 PG — LOW (ref 27–31)
MCHC RBC-ENTMCNC: 32.1 G/DL — SIGNIFICANT CHANGE UP (ref 32–37)
MCV RBC AUTO: 79.5 FL — LOW (ref 81–99)
MONOCYTES # BLD AUTO: 0.73 K/UL — HIGH (ref 0.1–0.6)
MONOCYTES NFR BLD AUTO: 6.6 % — SIGNIFICANT CHANGE UP (ref 1.7–9.3)
MRSA PCR RESULT.: NEGATIVE — SIGNIFICANT CHANGE UP
NEUTROPHILS # BLD AUTO: 9.61 K/UL — HIGH (ref 1.4–6.5)
NEUTROPHILS NFR BLD AUTO: 86.6 % — HIGH (ref 42.2–75.2)
NRBC # BLD: 0 /100 WBCS — SIGNIFICANT CHANGE UP (ref 0–0)
PLATELET # BLD AUTO: 425 K/UL — HIGH (ref 130–400)
PMV BLD: 9.8 FL — SIGNIFICANT CHANGE UP (ref 7.4–10.4)
POTASSIUM SERPL-MCNC: 3.6 MMOL/L — SIGNIFICANT CHANGE UP (ref 3.5–5)
POTASSIUM SERPL-SCNC: 3.6 MMOL/L — SIGNIFICANT CHANGE UP (ref 3.5–5)
PROCALCITONIN SERPL-MCNC: 41.9 NG/ML — HIGH (ref 0.02–0.1)
PROT SERPL-MCNC: 6.3 G/DL — SIGNIFICANT CHANGE UP (ref 6–8)
RBC # BLD: 3.52 M/UL — LOW (ref 4.2–5.4)
RBC # FLD: 15.9 % — HIGH (ref 11.5–14.5)
SODIUM SERPL-SCNC: 143 MMOL/L — SIGNIFICANT CHANGE UP (ref 135–146)
SPECIMEN SOURCE: SIGNIFICANT CHANGE UP
WBC # BLD: 11.09 K/UL — HIGH (ref 4.8–10.8)
WBC # FLD AUTO: 11.09 K/UL — HIGH (ref 4.8–10.8)

## 2024-05-03 PROCEDURE — 99239 HOSP IP/OBS DSCHRG MGMT >30: CPT

## 2024-05-03 RX ADMIN — Medication 30 MILLIGRAM(S): at 12:26

## 2024-05-03 RX ADMIN — Medication 1: at 12:25

## 2024-05-03 RX ADMIN — CEFTRIAXONE 100 MILLIGRAM(S): 500 INJECTION, POWDER, FOR SOLUTION INTRAMUSCULAR; INTRAVENOUS at 12:25

## 2024-05-03 RX ADMIN — Medication 60 MILLIGRAM(S): at 05:15

## 2024-05-03 RX ADMIN — HEPARIN SODIUM 5000 UNIT(S): 5000 INJECTION INTRAVENOUS; SUBCUTANEOUS at 05:15

## 2024-05-03 RX ADMIN — MEMANTINE HYDROCHLORIDE 10 MILLIGRAM(S): 10 TABLET ORAL at 05:15

## 2024-05-03 RX ADMIN — AZITHROMYCIN 255 MILLIGRAM(S): 500 TABLET, FILM COATED ORAL at 11:21

## 2024-05-03 NOTE — DISCHARGE NOTE NURSING/CASE MANAGEMENT/SOCIAL WORK - NSDCPEFALRISK_GEN_ALL_CORE
For information on Fall & Injury Prevention, visit: [email or web address] OR  [email or web address] OR  https://www.cdc.gov/steadi/patient.html

## 2024-05-03 NOTE — DISCHARGE NOTE PROVIDER - HOSPITAL COURSE
83 year-old female with a past medical history of hyperlipidemia, diabetes, dementia, COPD on 2 L  oxygen, and hypertension who was brought in by daughter for fever and shortness of breath.     #Shortness of breath 2/2 PNA-community acquired   #sepsis POA  #hx COPD exacerbation , on 2 L at home  - started on ceftriaxone and azithro IV - plan to switch to po 5/3 if clinically indicated   - bcx ngtd  -titrated down O2 as appropriate   -procalcitonin - 8.56 on admission  -duonebs  -cw home inhalers   -MRSA negative  -urine strep, legionella negative      #UTI  UA positive  ucx positive for E. Coli and enterococcus, pt completed abx course    #KORI-resolved   cr 1.8-->1.3 --> 1.2 , baseline 1.2 resolved on day of discharge  - avoided nephrotoxic meds    #Anemia likely dilutional   no bleeding   hgb 9.3, stable during hospital course    #hx DM   -held PO meds  -ISS  -monitored FS    #HTN  cw nifedipine    #HLD  cw statin     #dementia  cw memantine     Patient medically cleared for discharge, per medical attending.      83 year-old female with a past medical history of hyperlipidemia, diabetes, dementia, COPD on 2 L  oxygen, and hypertension who was brought in by daughter for fever and shortness of breath.     #Shortness of breath 2/2 PNA-community acquired   #sepsis POA  #hx COPD exacerbation , on 2 L at home  - started on ceftriaxone and azithro IV - plan to switch to po azithro to complete 5 day   - bcx ngtd  -titrated down O2 as appropriate   -procalcitonin - 8.56 on admission  -duonebs  -cw home inhalers   -MRSA negative  -urine strep, legionella negative      #UTI  UA positive  ucx positive for E. Coli and enterococcus, pt completed abx course    #KORI-resolved   cr 1.8-->1.3 --> 1.2 , baseline 1.2 resolved on day of discharge  - avoided nephrotoxic meds    #Anemia likely dilutional   no bleeding   hgb 9.3, stable during hospital course    #hx DM   -held PO meds  -ISS  -monitored FS    #HTN  cw nifedipine    #HLD  cw statin     #dementia  cw memantine     Patient medically cleared for discharge with home care

## 2024-05-03 NOTE — DISCHARGE NOTE PROVIDER - NSDCCPCAREPLAN_GEN_ALL_CORE_FT
PRINCIPAL DISCHARGE DIAGNOSIS  Diagnosis: PNA (pneumonia)  Assessment and Plan of Treatment: Be sure to continue taking all antibiotics as prescribed. You should also take a probiotic every day that you are on antibiotics to help prevent getting an infectious type of diarrhea. If you do notice that you are getting diarrhea, more than 4-5 episodes a day, or if you are having worsening symptoms of pneumonia, be sure to call your primary care doctor or to come back to the ER.      SECONDARY DISCHARGE DIAGNOSES  Diagnosis: Hypoxia  Assessment and Plan of Treatment:     Diagnosis: Acute UTI  Assessment and Plan of Treatment:

## 2024-05-03 NOTE — DISCHARGE NOTE PROVIDER - NSDCHHHOMEBOUNDOTHER_GEN_ALL_CORE_FT
impairment in aerobic capacity/endurance, gait, locomotion, and balance; muscle strength as noted by physical Thearpy during assessment

## 2024-05-03 NOTE — DISCHARGE NOTE PROVIDER - ATTENDING DISCHARGE PHYSICAL EXAMINATION:
Vital Signs Last 24 Hrs  T(F): 96.7 (03 May 2024 13:10), Max: 97.2 (03 May 2024 05:34)  HR: 90 (03 May 2024 13:10) (71 - 90)  BP: 151/72 (03 May 2024 13:10) (116/76 - 151/72)  RR: 16 (03 May 2024 13:10) (16 - 18)  SpO2: 96% (03 May 2024 13:10) (94% - 97%)    PHYSICAL EXAM:  GENERAL: NAD, well-groomed, well-developed  HEAD:  Atraumatic, Normocephalic  EYES: EOMI, conjunctiva and sclera clear  ENMT: Moist mucous membranes, Good dentition, no thrush  NECK: Supple, No JVD  CHEST/LUNG: Clear to auscultation bilaterally, diminished air entry, non-labored breathing  HEART: RRR; S1/S2, 2/6 systolic murmur   ABDOMEN: Soft, Nontender, Nondistended; Bowel sounds present  VASCULAR: Normal pulses, Normal capillary refill  EXTREMITIES: No calf tenderness, No cyanosis, No edema  LYMPH: Normal; No lymphadenopathy noted  SKIN: Warm, Intact  PSYCH: Normal mood, Normal affect  NERVOUS SYSTEM:  A/O x0, Good concentration

## 2024-05-03 NOTE — DISCHARGE NOTE PROVIDER - NSDCMRMEDTOKEN_GEN_ALL_CORE_FT
albuterol:   Breo Ellipta 200 mcg-25 mcg/inh inhalation powder: 1 puff(s) inhaled once a day  gabapentin 100 mg oral capsule: orally once a day (at bedtime)  memantine 10 mg oral tablet: 1 tab(s) orally 2 times a day  metFORMIN 500 mg oral tablet: 1 tab(s) orally 2 times a day  NIFEdipine 60 mg oral tablet, extended release: 1 tab(s) orally once a day  PARoxetine 30 mg oral tablet: 1 tab(s) orally once a day  rosuvastatin 10 mg oral tablet: 1 tab(s) orally once a day   albuterol: 1 puff(s) inhaled every 8 hours as needed for  cough  Breo Ellipta 200 mcg-25 mcg/inh inhalation powder: 1 puff(s) inhaled once a day  gabapentin 100 mg oral capsule: orally once a day (at bedtime)  memantine 10 mg oral tablet: 1 tab(s) orally 2 times a day  metFORMIN 500 mg oral tablet: 1 tab(s) orally 2 times a day  NIFEdipine 60 mg oral tablet, extended release: 1 tab(s) orally once a day  PARoxetine 30 mg oral tablet: 1 tab(s) orally once a day  rosuvastatin 10 mg oral tablet: 1 tab(s) orally once a day  Zithromax 500 mg oral tablet: 1 tab(s) orally once a day

## 2024-05-03 NOTE — DISCHARGE NOTE NURSING/CASE MANAGEMENT/SOCIAL WORK - PATIENT PORTAL LINK FT
You can access the FollowMyHealth Patient Portal offered by [facility] by registering at the following website: [email or web address]. By joining [employer]’s FollowMyHealth portal, you will also be able to view your health information using other applications (apps) compatible with our system.

## 2024-05-04 RX ORDER — AZITHROMYCIN 500 MG/1
1 TABLET, FILM COATED ORAL
Qty: 3 | Refills: 0
Start: 2024-05-04 | End: 2024-05-06

## 2024-05-06 LAB
CULTURE RESULTS: SIGNIFICANT CHANGE UP
CULTURE RESULTS: SIGNIFICANT CHANGE UP
SPECIMEN SOURCE: SIGNIFICANT CHANGE UP
SPECIMEN SOURCE: SIGNIFICANT CHANGE UP

## 2024-05-08 DIAGNOSIS — Z87.891 PERSONAL HISTORY OF NICOTINE DEPENDENCE: ICD-10-CM

## 2024-05-08 DIAGNOSIS — J18.9 PNEUMONIA, UNSPECIFIED ORGANISM: ICD-10-CM

## 2024-05-08 DIAGNOSIS — F03.90 UNSPECIFIED DEMENTIA WITHOUT BEHAVIORAL DISTURBANCE: ICD-10-CM

## 2024-05-08 DIAGNOSIS — E11.9 TYPE 2 DIABETES MELLITUS WITHOUT COMPLICATIONS: ICD-10-CM

## 2024-05-08 DIAGNOSIS — A41.9 SEPSIS, UNSPECIFIED ORGANISM: ICD-10-CM

## 2024-05-08 DIAGNOSIS — B95.2 ENTEROCOCCUS AS THE CAUSE OF DISEASES CLASSIFIED ELSEWHERE: ICD-10-CM

## 2024-05-08 DIAGNOSIS — Z87.440 PERSONAL HISTORY OF URINARY (TRACT) INFECTIONS: ICD-10-CM

## 2024-05-08 DIAGNOSIS — N17.9 ACUTE KIDNEY FAILURE, UNSPECIFIED: ICD-10-CM

## 2024-05-08 DIAGNOSIS — Z79.82 LONG TERM (CURRENT) USE OF ASPIRIN: ICD-10-CM

## 2024-05-08 DIAGNOSIS — J44.9 CHRONIC OBSTRUCTIVE PULMONARY DISEASE, UNSPECIFIED: ICD-10-CM

## 2024-05-08 DIAGNOSIS — Z79.84 LONG TERM (CURRENT) USE OF ORAL HYPOGLYCEMIC DRUGS: ICD-10-CM

## 2024-05-08 DIAGNOSIS — N39.0 URINARY TRACT INFECTION, SITE NOT SPECIFIED: ICD-10-CM

## 2024-05-08 DIAGNOSIS — F32.A DEPRESSION, UNSPECIFIED: ICD-10-CM

## 2024-05-08 DIAGNOSIS — Z79.52 LONG TERM (CURRENT) USE OF SYSTEMIC STEROIDS: ICD-10-CM

## 2024-05-08 DIAGNOSIS — B96.20 UNSPECIFIED ESCHERICHIA COLI [E. COLI] AS THE CAUSE OF DISEASES CLASSIFIED ELSEWHERE: ICD-10-CM

## 2024-05-08 DIAGNOSIS — E78.5 HYPERLIPIDEMIA, UNSPECIFIED: ICD-10-CM

## 2024-05-08 DIAGNOSIS — Z79.51 LONG TERM (CURRENT) USE OF INHALED STEROIDS: ICD-10-CM

## 2024-05-08 DIAGNOSIS — D64.9 ANEMIA, UNSPECIFIED: ICD-10-CM

## 2024-05-08 DIAGNOSIS — I10 ESSENTIAL (PRIMARY) HYPERTENSION: ICD-10-CM

## 2024-05-08 DIAGNOSIS — J96.01 ACUTE RESPIRATORY FAILURE WITH HYPOXIA: ICD-10-CM

## 2024-05-08 DIAGNOSIS — Z79.899 OTHER LONG TERM (CURRENT) DRUG THERAPY: ICD-10-CM

## 2024-05-08 DIAGNOSIS — Z99.81 DEPENDENCE ON SUPPLEMENTAL OXYGEN: ICD-10-CM

## 2024-05-12 ENCOUNTER — INPATIENT (INPATIENT)
Facility: HOSPITAL | Age: 84
LOS: 6 days | Discharge: HOME | DRG: 871 | End: 2024-05-19
Attending: INTERNAL MEDICINE | Admitting: INTERNAL MEDICINE
Payer: MEDICARE

## 2024-05-12 VITALS
DIASTOLIC BLOOD PRESSURE: 67 MMHG | OXYGEN SATURATION: 93 % | RESPIRATION RATE: 19 BRPM | TEMPERATURE: 103 F | HEART RATE: 116 BPM | SYSTOLIC BLOOD PRESSURE: 148 MMHG | HEIGHT: 62 IN | WEIGHT: 160.06 LBS

## 2024-05-12 PROCEDURE — 99291 CRITICAL CARE FIRST HOUR: CPT

## 2024-05-13 DIAGNOSIS — J44.9 CHRONIC OBSTRUCTIVE PULMONARY DISEASE, UNSPECIFIED: ICD-10-CM

## 2024-05-13 LAB
A1C WITH ESTIMATED AVERAGE GLUCOSE RESULT: 7.7 % — HIGH (ref 4–5.6)
ALBUMIN SERPL ELPH-MCNC: 3.6 G/DL — SIGNIFICANT CHANGE UP (ref 3.5–5.2)
ALP SERPL-CCNC: 74 U/L — SIGNIFICANT CHANGE UP (ref 30–115)
ALT FLD-CCNC: 13 U/L — SIGNIFICANT CHANGE UP (ref 0–41)
ANION GAP SERPL CALC-SCNC: 11 MMOL/L — SIGNIFICANT CHANGE UP (ref 7–14)
ANION GAP SERPL CALC-SCNC: 16 MMOL/L — HIGH (ref 7–14)
ANISOCYTOSIS BLD QL: SLIGHT — SIGNIFICANT CHANGE UP
APPEARANCE UR: ABNORMAL
AST SERPL-CCNC: 13 U/L — SIGNIFICANT CHANGE UP (ref 0–41)
BACTERIA # UR AUTO: ABNORMAL /HPF
BASE EXCESS BLDV CALC-SCNC: -1.6 MMOL/L — SIGNIFICANT CHANGE UP (ref -2–3)
BASOPHILS # BLD AUTO: 0.19 K/UL — SIGNIFICANT CHANGE UP (ref 0–0.2)
BASOPHILS NFR BLD AUTO: 1 % — SIGNIFICANT CHANGE UP (ref 0–1)
BILIRUB SERPL-MCNC: 0.3 MG/DL — SIGNIFICANT CHANGE UP (ref 0.2–1.2)
BILIRUB UR-MCNC: NEGATIVE — SIGNIFICANT CHANGE UP
BUN SERPL-MCNC: 24 MG/DL — HIGH (ref 10–20)
BUN SERPL-MCNC: 25 MG/DL — HIGH (ref 10–20)
CA-I SERPL-SCNC: 1.32 MMOL/L — SIGNIFICANT CHANGE UP (ref 1.15–1.33)
CALCIUM SERPL-MCNC: 9.3 MG/DL — SIGNIFICANT CHANGE UP (ref 8.4–10.5)
CALCIUM SERPL-MCNC: 9.8 MG/DL — SIGNIFICANT CHANGE UP (ref 8.4–10.5)
CHLORIDE SERPL-SCNC: 105 MMOL/L — SIGNIFICANT CHANGE UP (ref 98–110)
CHLORIDE SERPL-SCNC: 107 MMOL/L — SIGNIFICANT CHANGE UP (ref 98–110)
CO2 SERPL-SCNC: 20 MMOL/L — SIGNIFICANT CHANGE UP (ref 17–32)
CO2 SERPL-SCNC: 23 MMOL/L — SIGNIFICANT CHANGE UP (ref 17–32)
COLOR SPEC: YELLOW — SIGNIFICANT CHANGE UP
CREAT SERPL-MCNC: 1.4 MG/DL — SIGNIFICANT CHANGE UP (ref 0.7–1.5)
CREAT SERPL-MCNC: 1.5 MG/DL — SIGNIFICANT CHANGE UP (ref 0.7–1.5)
DIFF PNL FLD: ABNORMAL
EGFR: 34 ML/MIN/1.73M2 — LOW
EGFR: 37 ML/MIN/1.73M2 — LOW
ELLIPTOCYTES BLD QL SMEAR: SLIGHT — SIGNIFICANT CHANGE UP
EOSINOPHIL NFR BLD AUTO: 0 % — SIGNIFICANT CHANGE UP (ref 0–8)
EPI CELLS # UR: PRESENT
ESTIMATED AVERAGE GLUCOSE: 174 MG/DL — HIGH (ref 68–114)
FLUAV AG NPH QL: SIGNIFICANT CHANGE UP
FLUBV AG NPH QL: SIGNIFICANT CHANGE UP
GAS PNL BLDV: 138 MMOL/L — SIGNIFICANT CHANGE UP (ref 136–145)
GAS PNL BLDV: SIGNIFICANT CHANGE UP
GIANT PLATELETS BLD QL SMEAR: PRESENT — SIGNIFICANT CHANGE UP
GLUCOSE BLDC GLUCOMTR-MCNC: 163 MG/DL — HIGH (ref 70–99)
GLUCOSE BLDC GLUCOMTR-MCNC: 183 MG/DL — HIGH (ref 70–99)
GLUCOSE BLDC GLUCOMTR-MCNC: 193 MG/DL — HIGH (ref 70–99)
GLUCOSE BLDC GLUCOMTR-MCNC: 228 MG/DL — HIGH (ref 70–99)
GLUCOSE SERPL-MCNC: 147 MG/DL — HIGH (ref 70–99)
GLUCOSE SERPL-MCNC: 226 MG/DL — HIGH (ref 70–99)
GLUCOSE UR QL: NEGATIVE MG/DL — SIGNIFICANT CHANGE UP
HCO3 BLDV-SCNC: 23 MMOL/L — SIGNIFICANT CHANGE UP (ref 22–29)
HCT VFR BLD CALC: 25.1 % — LOW (ref 37–47)
HCT VFR BLD CALC: 30 % — LOW (ref 37–47)
HCT VFR BLDA CALC: 31 % — LOW (ref 34.5–46.5)
HGB BLD CALC-MCNC: 10.2 G/DL — LOW (ref 11.7–16.1)
HGB BLD-MCNC: 7.9 G/DL — LOW (ref 12–16)
HGB BLD-MCNC: 9.2 G/DL — LOW (ref 12–16)
KETONES UR-MCNC: ABNORMAL MG/DL
LACTATE BLDV-MCNC: 1.9 MMOL/L — SIGNIFICANT CHANGE UP (ref 0.5–2)
LEUKOCYTE ESTERASE UR-ACNC: ABNORMAL
LYMPHOCYTES # BLD AUTO: 0.37 K/UL — LOW (ref 1.2–3.4)
LYMPHOCYTES # BLD AUTO: 2 % — LOW (ref 20.5–51.1)
MANUAL SMEAR VERIFICATION: SIGNIFICANT CHANGE UP
MCHC RBC-ENTMCNC: 25.3 PG — LOW (ref 27–31)
MCHC RBC-ENTMCNC: 25.7 PG — LOW (ref 27–31)
MCHC RBC-ENTMCNC: 30.7 G/DL — LOW (ref 32–37)
MCHC RBC-ENTMCNC: 31.5 G/DL — LOW (ref 32–37)
MCV RBC AUTO: 81.8 FL — SIGNIFICANT CHANGE UP (ref 81–99)
MCV RBC AUTO: 82.6 FL — SIGNIFICANT CHANGE UP (ref 81–99)
MONOCYTES # BLD AUTO: 0.19 K/UL — SIGNIFICANT CHANGE UP (ref 0.1–0.6)
MONOCYTES NFR BLD AUTO: 1 % — LOW (ref 1.7–9.3)
NEUTROPHILS # BLD AUTO: 17.78 K/UL — HIGH (ref 1.4–6.5)
NEUTROPHILS NFR BLD AUTO: 84 % — HIGH (ref 42.2–75.2)
NEUTS BAND # BLD: 12 % — HIGH (ref 0–6)
NITRITE UR-MCNC: NEGATIVE — SIGNIFICANT CHANGE UP
NRBC # BLD: 0 /100 WBCS — SIGNIFICANT CHANGE UP (ref 0–0)
NRBC # BLD: 0 /100 WBCS — SIGNIFICANT CHANGE UP (ref 0–0)
NRBC # BLD: SIGNIFICANT CHANGE UP /100 WBCS (ref 0–0)
NT-PROBNP SERPL-SCNC: 1119 PG/ML — HIGH (ref 0–300)
PCO2 BLDV: 37 MMHG — LOW (ref 39–42)
PH BLDV: 7.4 — SIGNIFICANT CHANGE UP (ref 7.32–7.43)
PH UR: 6.5 — SIGNIFICANT CHANGE UP (ref 5–8)
PLAT MORPH BLD: NORMAL — SIGNIFICANT CHANGE UP
PLATELET # BLD AUTO: 439 K/UL — HIGH (ref 130–400)
PLATELET # BLD AUTO: 529 K/UL — HIGH (ref 130–400)
PLATELET COUNT - ESTIMATE: NORMAL — SIGNIFICANT CHANGE UP
PMV BLD: 9.3 FL — SIGNIFICANT CHANGE UP (ref 7.4–10.4)
PMV BLD: 9.5 FL — SIGNIFICANT CHANGE UP (ref 7.4–10.4)
PO2 BLDV: 43 MMHG — SIGNIFICANT CHANGE UP (ref 25–45)
POLYCHROMASIA BLD QL SMEAR: SLIGHT — SIGNIFICANT CHANGE UP
POTASSIUM BLDV-SCNC: 4.2 MMOL/L — SIGNIFICANT CHANGE UP (ref 3.5–5.1)
POTASSIUM SERPL-MCNC: 4.4 MMOL/L — SIGNIFICANT CHANGE UP (ref 3.5–5)
POTASSIUM SERPL-MCNC: 4.8 MMOL/L — SIGNIFICANT CHANGE UP (ref 3.5–5)
POTASSIUM SERPL-SCNC: 4.4 MMOL/L — SIGNIFICANT CHANGE UP (ref 3.5–5)
POTASSIUM SERPL-SCNC: 4.8 MMOL/L — SIGNIFICANT CHANGE UP (ref 3.5–5)
PROCALCITONIN SERPL-MCNC: 7.9 NG/ML — HIGH (ref 0.02–0.1)
PROT SERPL-MCNC: 6.6 G/DL — SIGNIFICANT CHANGE UP (ref 6–8)
PROT UR-MCNC: 30 MG/DL
RBC # BLD: 3.07 M/UL — LOW (ref 4.2–5.4)
RBC # BLD: 3.63 M/UL — LOW (ref 4.2–5.4)
RBC # FLD: 16.7 % — HIGH (ref 11.5–14.5)
RBC # FLD: 16.7 % — HIGH (ref 11.5–14.5)
RBC BLD AUTO: ABNORMAL
RBC CASTS # UR COMP ASSIST: 3 /HPF — SIGNIFICANT CHANGE UP (ref 0–4)
RSV RNA NPH QL NAA+NON-PROBE: SIGNIFICANT CHANGE UP
SAO2 % BLDV: 71.6 % — SIGNIFICANT CHANGE UP (ref 67–88)
SARS-COV-2 RNA SPEC QL NAA+PROBE: SIGNIFICANT CHANGE UP
SODIUM SERPL-SCNC: 141 MMOL/L — SIGNIFICANT CHANGE UP (ref 135–146)
SODIUM SERPL-SCNC: 141 MMOL/L — SIGNIFICANT CHANGE UP (ref 135–146)
SP GR SPEC: 1.01 — SIGNIFICANT CHANGE UP (ref 1–1.03)
SQUAMOUS # UR AUTO: 2 /HPF — SIGNIFICANT CHANGE UP (ref 0–5)
TROPONIN SAMPLING TIME: SIGNIFICANT CHANGE UP
TROPONIN T, HIGH SENSITIVITY RESULT: 37 NG/L — HIGH (ref 6–13)
UROBILINOGEN FLD QL: 0.2 MG/DL — SIGNIFICANT CHANGE UP (ref 0.2–1)
WBC # BLD: 14.16 K/UL — HIGH (ref 4.8–10.8)
WBC # BLD: 18.52 K/UL — HIGH (ref 4.8–10.8)
WBC # FLD AUTO: 14.16 K/UL — HIGH (ref 4.8–10.8)
WBC # FLD AUTO: 18.52 K/UL — HIGH (ref 4.8–10.8)
WBC UR QL: 40 /HPF — HIGH (ref 0–5)

## 2024-05-13 PROCEDURE — 97162 PT EVAL MOD COMPLEX 30 MIN: CPT | Mod: GP

## 2024-05-13 PROCEDURE — 80053 COMPREHEN METABOLIC PANEL: CPT

## 2024-05-13 PROCEDURE — 87641 MR-STAPH DNA AMP PROBE: CPT

## 2024-05-13 PROCEDURE — 80048 BASIC METABOLIC PNL TOTAL CA: CPT

## 2024-05-13 PROCEDURE — 87449 NOS EACH ORGANISM AG IA: CPT

## 2024-05-13 PROCEDURE — 81001 URINALYSIS AUTO W/SCOPE: CPT

## 2024-05-13 PROCEDURE — 84484 ASSAY OF TROPONIN QUANT: CPT

## 2024-05-13 PROCEDURE — 93010 ELECTROCARDIOGRAM REPORT: CPT

## 2024-05-13 PROCEDURE — 83735 ASSAY OF MAGNESIUM: CPT

## 2024-05-13 PROCEDURE — 87086 URINE CULTURE/COLONY COUNT: CPT

## 2024-05-13 PROCEDURE — 71045 X-RAY EXAM CHEST 1 VIEW: CPT | Mod: 26

## 2024-05-13 PROCEDURE — 85025 COMPLETE CBC W/AUTO DIFF WBC: CPT

## 2024-05-13 PROCEDURE — 36415 COLL VENOUS BLD VENIPUNCTURE: CPT

## 2024-05-13 PROCEDURE — 87899 AGENT NOS ASSAY W/OPTIC: CPT

## 2024-05-13 PROCEDURE — 87640 STAPH A DNA AMP PROBE: CPT

## 2024-05-13 PROCEDURE — 82962 GLUCOSE BLOOD TEST: CPT

## 2024-05-13 PROCEDURE — 99222 1ST HOSP IP/OBS MODERATE 55: CPT

## 2024-05-13 PROCEDURE — 92526 ORAL FUNCTION THERAPY: CPT | Mod: GN

## 2024-05-13 PROCEDURE — 83036 HEMOGLOBIN GLYCOSYLATED A1C: CPT

## 2024-05-13 PROCEDURE — 94660 CPAP INITIATION&MGMT: CPT

## 2024-05-13 PROCEDURE — 87186 SC STD MICRODIL/AGAR DIL: CPT

## 2024-05-13 PROCEDURE — 92610 EVALUATE SWALLOWING FUNCTION: CPT | Mod: GN

## 2024-05-13 PROCEDURE — 84145 PROCALCITONIN (PCT): CPT

## 2024-05-13 PROCEDURE — 85027 COMPLETE CBC AUTOMATED: CPT

## 2024-05-13 RX ORDER — NIFEDIPINE 30 MG
60 TABLET, EXTENDED RELEASE 24 HR ORAL DAILY
Refills: 0 | Status: DISCONTINUED | OUTPATIENT
Start: 2024-05-13 | End: 2024-05-16

## 2024-05-13 RX ORDER — GABAPENTIN 400 MG/1
100 CAPSULE ORAL AT BEDTIME
Refills: 0 | Status: DISCONTINUED | OUTPATIENT
Start: 2024-05-13 | End: 2024-05-19

## 2024-05-13 RX ORDER — SODIUM CHLORIDE 9 MG/ML
1000 INJECTION, SOLUTION INTRAVENOUS
Refills: 0 | Status: DISCONTINUED | OUTPATIENT
Start: 2024-05-13 | End: 2024-05-19

## 2024-05-13 RX ORDER — SODIUM CHLORIDE 9 MG/ML
2000 INJECTION, SOLUTION INTRAVENOUS ONCE
Refills: 0 | Status: COMPLETED | OUTPATIENT
Start: 2024-05-13 | End: 2024-05-13

## 2024-05-13 RX ORDER — ONDANSETRON 8 MG/1
4 TABLET, FILM COATED ORAL EVERY 6 HOURS
Refills: 0 | Status: DISCONTINUED | OUTPATIENT
Start: 2024-05-13 | End: 2024-05-19

## 2024-05-13 RX ORDER — CEFEPIME 1 G/1
2000 INJECTION, POWDER, FOR SOLUTION INTRAMUSCULAR; INTRAVENOUS EVERY 12 HOURS
Refills: 0 | Status: DISCONTINUED | OUTPATIENT
Start: 2024-05-13 | End: 2024-05-16

## 2024-05-13 RX ORDER — MAGNESIUM SULFATE 500 MG/ML
2 VIAL (ML) INJECTION ONCE
Refills: 0 | Status: COMPLETED | OUTPATIENT
Start: 2024-05-13 | End: 2024-05-13

## 2024-05-13 RX ORDER — ACETAMINOPHEN 500 MG
650 TABLET ORAL EVERY 6 HOURS
Refills: 0 | Status: DISCONTINUED | OUTPATIENT
Start: 2024-05-13 | End: 2024-05-19

## 2024-05-13 RX ORDER — IBUPROFEN 200 MG
600 TABLET ORAL ONCE
Refills: 0 | Status: COMPLETED | OUTPATIENT
Start: 2024-05-13 | End: 2024-05-13

## 2024-05-13 RX ORDER — CEFEPIME 1 G/1
250 INJECTION, POWDER, FOR SOLUTION INTRAMUSCULAR; INTRAVENOUS EVERY 12 HOURS
Refills: 0 | Status: DISCONTINUED | OUTPATIENT
Start: 2024-05-13 | End: 2024-05-13

## 2024-05-13 RX ORDER — SODIUM CHLORIDE 9 MG/ML
1000 INJECTION INTRAMUSCULAR; INTRAVENOUS; SUBCUTANEOUS
Refills: 0 | Status: DISCONTINUED | OUTPATIENT
Start: 2024-05-13 | End: 2024-05-15

## 2024-05-13 RX ORDER — DEXTROSE 10 % IN WATER 10 %
125 INTRAVENOUS SOLUTION INTRAVENOUS ONCE
Refills: 0 | Status: DISCONTINUED | OUTPATIENT
Start: 2024-05-13 | End: 2024-05-19

## 2024-05-13 RX ORDER — ATORVASTATIN CALCIUM 80 MG/1
40 TABLET, FILM COATED ORAL AT BEDTIME
Refills: 0 | Status: DISCONTINUED | OUTPATIENT
Start: 2024-05-13 | End: 2024-05-19

## 2024-05-13 RX ORDER — IPRATROPIUM/ALBUTEROL SULFATE 18-103MCG
3 AEROSOL WITH ADAPTER (GRAM) INHALATION EVERY 6 HOURS
Refills: 0 | Status: DISCONTINUED | OUTPATIENT
Start: 2024-05-13 | End: 2024-05-19

## 2024-05-13 RX ORDER — IPRATROPIUM/ALBUTEROL SULFATE 18-103MCG
3 AEROSOL WITH ADAPTER (GRAM) INHALATION EVERY 6 HOURS
Refills: 0 | Status: DISCONTINUED | OUTPATIENT
Start: 2024-05-13 | End: 2024-05-13

## 2024-05-13 RX ORDER — GLUCAGON INJECTION, SOLUTION 0.5 MG/.1ML
1 INJECTION, SOLUTION SUBCUTANEOUS ONCE
Refills: 0 | Status: DISCONTINUED | OUTPATIENT
Start: 2024-05-13 | End: 2024-05-19

## 2024-05-13 RX ORDER — DEXTROSE 50 % IN WATER 50 %
15 SYRINGE (ML) INTRAVENOUS ONCE
Refills: 0 | Status: DISCONTINUED | OUTPATIENT
Start: 2024-05-13 | End: 2024-05-19

## 2024-05-13 RX ORDER — IPRATROPIUM/ALBUTEROL SULFATE 18-103MCG
3 AEROSOL WITH ADAPTER (GRAM) INHALATION ONCE
Refills: 0 | Status: COMPLETED | OUTPATIENT
Start: 2024-05-13 | End: 2024-05-13

## 2024-05-13 RX ORDER — CEFEPIME 1 G/1
2000 INJECTION, POWDER, FOR SOLUTION INTRAMUSCULAR; INTRAVENOUS EVERY 12 HOURS
Refills: 0 | Status: DISCONTINUED | OUTPATIENT
Start: 2024-05-13 | End: 2024-05-13

## 2024-05-13 RX ORDER — MEMANTINE HYDROCHLORIDE 10 MG/1
10 TABLET ORAL
Refills: 0 | Status: DISCONTINUED | OUTPATIENT
Start: 2024-05-13 | End: 2024-05-19

## 2024-05-13 RX ORDER — DEXTROSE 50 % IN WATER 50 %
12.5 SYRINGE (ML) INTRAVENOUS ONCE
Refills: 0 | Status: DISCONTINUED | OUTPATIENT
Start: 2024-05-13 | End: 2024-05-19

## 2024-05-13 RX ORDER — DEXTROSE 50 % IN WATER 50 %
25 SYRINGE (ML) INTRAVENOUS ONCE
Refills: 0 | Status: DISCONTINUED | OUTPATIENT
Start: 2024-05-13 | End: 2024-05-19

## 2024-05-13 RX ORDER — BUDESONIDE AND FORMOTEROL FUMARATE DIHYDRATE 160; 4.5 UG/1; UG/1
2 AEROSOL RESPIRATORY (INHALATION)
Refills: 0 | Status: DISCONTINUED | OUTPATIENT
Start: 2024-05-13 | End: 2024-05-19

## 2024-05-13 RX ORDER — INSULIN LISPRO 100/ML
VIAL (ML) SUBCUTANEOUS
Refills: 0 | Status: DISCONTINUED | OUTPATIENT
Start: 2024-05-13 | End: 2024-05-19

## 2024-05-13 RX ORDER — HEPARIN SODIUM 5000 [USP'U]/ML
5000 INJECTION INTRAVENOUS; SUBCUTANEOUS EVERY 12 HOURS
Refills: 0 | Status: DISCONTINUED | OUTPATIENT
Start: 2024-05-13 | End: 2024-05-13

## 2024-05-13 RX ORDER — HEPARIN SODIUM 5000 [USP'U]/ML
5000 INJECTION INTRAVENOUS; SUBCUTANEOUS EVERY 8 HOURS
Refills: 0 | Status: DISCONTINUED | OUTPATIENT
Start: 2024-05-13 | End: 2024-05-19

## 2024-05-13 RX ORDER — CEFEPIME 1 G/1
2000 INJECTION, POWDER, FOR SOLUTION INTRAMUSCULAR; INTRAVENOUS ONCE
Refills: 0 | Status: COMPLETED | OUTPATIENT
Start: 2024-05-13 | End: 2024-05-13

## 2024-05-13 RX ADMIN — CEFEPIME 100 MILLIGRAM(S): 1 INJECTION, POWDER, FOR SOLUTION INTRAMUSCULAR; INTRAVENOUS at 12:05

## 2024-05-13 RX ADMIN — SODIUM CHLORIDE 2000 MILLILITER(S): 9 INJECTION, SOLUTION INTRAVENOUS at 01:37

## 2024-05-13 RX ADMIN — SODIUM CHLORIDE 30 MILLILITER(S): 9 INJECTION INTRAMUSCULAR; INTRAVENOUS; SUBCUTANEOUS at 17:33

## 2024-05-13 RX ADMIN — MEMANTINE HYDROCHLORIDE 10 MILLIGRAM(S): 10 TABLET ORAL at 17:33

## 2024-05-13 RX ADMIN — HEPARIN SODIUM 5000 UNIT(S): 5000 INJECTION INTRAVENOUS; SUBCUTANEOUS at 17:21

## 2024-05-13 RX ADMIN — HEPARIN SODIUM 5000 UNIT(S): 5000 INJECTION INTRAVENOUS; SUBCUTANEOUS at 21:36

## 2024-05-13 RX ADMIN — ATORVASTATIN CALCIUM 40 MILLIGRAM(S): 80 TABLET, FILM COATED ORAL at 21:36

## 2024-05-13 RX ADMIN — Medication 3 MILLILITER(S): at 00:53

## 2024-05-13 RX ADMIN — CEFEPIME 100 MILLIGRAM(S): 1 INJECTION, POWDER, FOR SOLUTION INTRAMUSCULAR; INTRAVENOUS at 23:05

## 2024-05-13 RX ADMIN — Medication 40 MILLIGRAM(S): at 17:20

## 2024-05-13 RX ADMIN — Medication 2: at 17:18

## 2024-05-13 RX ADMIN — SODIUM CHLORIDE 30 MILLILITER(S): 9 INJECTION INTRAMUSCULAR; INTRAVENOUS; SUBCUTANEOUS at 12:14

## 2024-05-13 RX ADMIN — HEPARIN SODIUM 5000 UNIT(S): 5000 INJECTION INTRAVENOUS; SUBCUTANEOUS at 05:22

## 2024-05-13 RX ADMIN — Medication 60 MILLIGRAM(S): at 05:23

## 2024-05-13 RX ADMIN — GABAPENTIN 100 MILLIGRAM(S): 400 CAPSULE ORAL at 21:36

## 2024-05-13 RX ADMIN — Medication 150 GRAM(S): at 00:53

## 2024-05-13 RX ADMIN — CEFEPIME 100 MILLIGRAM(S): 1 INJECTION, POWDER, FOR SOLUTION INTRAMUSCULAR; INTRAVENOUS at 01:28

## 2024-05-13 RX ADMIN — Medication 125 MILLIGRAM(S): at 00:53

## 2024-05-13 NOTE — H&P ADULT - NSHPPHYSICALEXAM_GEN_ALL_CORE
GENERAL:  82y/o Female NAD, resting comfortably.  HEAD:  Atraumatic, Normocephalic  EYES: EOMI, PERRLA, conjunctiva and sclera clear  NECK: Supple, No JVD, no cervical lymphadenopathy, non-tender  CHEST/LUNG: shallow breathing  bilaterally; + wheeze,+ rhonchi, no  rales  HEART: Regular rate and rhythm; S1&S2  ABDOMEN: Soft, Nontender, Nondistended x 4 quadrants; Bowel sounds present  EXTREMITIES:   Peripheral Pulses Present, No clubbing, no cyanosis, or no edema, no calf tenderness  PSYCH:  demented  NEUROLOGY: at base line  SKIN: WNL

## 2024-05-13 NOTE — CONSULT NOTE ADULT - SUBJECTIVE AND OBJECTIVE BOX
SABRINA DOMINGUEZ  83y, Female  Allergies    No Known Allergies    Intolerances      LOS      HPI  HPI:  83 year-old female came accompanied by her daughter on admission. As per daughter  pt had  shortness of breath. Pt poor historian due to dementia,   with a past medical history of hyperlipidemia, diabetes, dementia usual a&ox1, COPD on 2 L  oxygen, and hypertension Per daughter, patient has been having shortness of breath and a fever since last nigh Tmax  100.5, Daughter denies cough or wheezing.  NO coughing with eating. Decreased PO intake for past few days (13 May 2024 01:49)      INFECTIOUS DISEASE HISTORY:  Hospital course-  ID consulted for antimicrobial recommendations.     Prior hospital charts reviewed [Yes]  Primary team notes reviewed [Yes]  Other consultant notes reviewed [Yes]    REVIEW OF SYSTEMS:  CONSTITUTIONAL: No fever or chills  HEENT: No sore throat  RESPIRATORY: No cough, no shortness of breath  CARDIOVASCULAR: No chest pain or palpitations  GASTROINTESTINAL: No abdominal or epigastric pain  GENITOURINARY: No dysuria  NEUROLOGICAL: No headache/dizziness  MSK: No joint pain, erythema, or swelling; no back pain  SKIN: No itching, rashes  All other ROS negative except noted above    PAST MEDICAL & SURGICAL HISTORY:  COPD (chronic obstructive pulmonary disease)      Diabetes mellitus, type 2      High blood cholesterol      Essential hypertension      Dementia      Depression      No significant past surgical history      SOCIAL HISTORY:  - No recent travel    FAMILY HISTORY:  No pertinent family history in first degree relatives    ANTIMICROBIALS:  cefepime   IVPB 2000 every 12 hours      ANTIMICROBIALS (past 90 days):  MEDICATIONS  (STANDING):    cefepime   IVPB   100 mL/Hr IV Intermittent (05-13-24 @ 12:05)    cefepime   IVPB   100 mL/Hr IV Intermittent (05-13-24 @ 01:28)    levoFLOXacin IVPB   100 mL/Hr IV Intermittent (05-13-24 @ 01:37)        OTHER MEDS:   MEDICATIONS  (STANDING):  acetaminophen     Tablet .. 650 every 6 hours PRN  albuterol/ipratropium for Nebulization 3 every 6 hours PRN  aluminum hydroxide/magnesium hydroxide/simethicone Suspension 30 every 4 hours PRN  atorvastatin 40 at bedtime  budesonide 160 MICROgram(s)/formoterol 4.5 MICROgram(s) Inhaler 2 two times a day  dextrose 50% Injectable 25 once  dextrose 50% Injectable 12.5 once  dextrose Oral Gel 15 once PRN  gabapentin 100 at bedtime  glucagon  Injectable 1 once  heparin   Injectable 5000 every 8 hours  insulin lispro (ADMELOG) corrective regimen sliding scale  three times a day before meals  memantine 10 two times a day  methylPREDNISolone sodium succinate Injectable 40 every 12 hours  NIFEdipine XL 60 daily  ondansetron Injectable 4 every 6 hours PRN  PARoxetine 30 daily      VITALS:  Vital Signs Last 24 Hrs  T(F): 97.8 (05-13-24 @ 14:25), Max: 102.6 (05-12-24 @ 23:59)    Vital Signs Last 24 Hrs  HR: 69 (05-13-24 @ 14:25) (68 - 116)  BP: 148/70 (05-13-24 @ 14:25) (108/55 - 160/72)  RR: 20 (05-13-24 @ 14:25)  SpO2: 97% (05-13-24 @ 14:47) (93% - 100%)  Wt(kg): --    EXAM:  GENERAL: On Bipap, Frail looking.   HEAD: No head lesions  NECK: Supple  CHEST/LUNG: Shallow breath sounds.   HEART: S1 S2  ABDOMEN: Soft, nontender  EXTREMITIES: No clubbing, cyanosis, or petal edema  NERVOUS SYSTEM: Alert and oriented to person  MSK: No joint erythema, swelling or pain  SKIN: No rashes or lesions, no superficial thrombophlebitis    Labs:                        7.9    14.16 )-----------( 439      ( 13 May 2024 06:54 )             25.1     05-13    141  |  107  |  24<H>  ----------------------------<  226<H>  4.8   |  23  |  1.4    Ca    9.3      13 May 2024 06:54    TPro  6.6  /  Alb  3.6  /  TBili  0.3  /  DBili  x   /  AST  13  /  ALT  13  /  AlkPhos  74  05-13      WBC Trend:  WBC Count: 14.16 (05-13-24 @ 06:54)  WBC Count: 18.52 (05-13-24 @ 00:43)      Auto Neutrophil #: 17.78 K/uL (05-13-24 @ 00:43)  Band Neutrophils %: 12.0 % (05-13-24 @ 00:43)  Auto Neutrophil #: 9.61 K/uL (05-03-24 @ 07:13)  Auto Neutrophil #: 9.67 K/uL (05-02-24 @ 06:12)  Auto Neutrophil #: 7.37 K/uL (05-01-24 @ 10:09)      Creatine Trend:  Creatinine: 1.4 (05-13)  Creatinine: 1.5 (05-13)      Liver Biochemical Testing Trend:  Alanine Aminotransferase (ALT/SGPT): 13 (05-13)  Alanine Aminotransferase (ALT/SGPT): 35 (05-03)  Alanine Aminotransferase (ALT/SGPT): 26 (05-02)  Alanine Aminotransferase (ALT/SGPT): 21 (05-01)  Alanine Aminotransferase (ALT/SGPT): 14 (05-25)  Aspartate Aminotransferase (AST/SGOT): 13 (05-13-24 @ 00:43)  Aspartate Aminotransferase (AST/SGOT): 33 (05-03-24 @ 07:13)  Aspartate Aminotransferase (AST/SGOT): 34 (05-02-24 @ 06:12)  Aspartate Aminotransferase (AST/SGOT): 34 (05-01-24 @ 10:09)  Aspartate Aminotransferase (AST/SGOT): 13 (05-25-23 @ 13:15)  Bilirubin Total: 0.3 (05-13)  Bilirubin Total: <0.2 (05-03)  Bilirubin Total: <0.2 (05-02)  Bilirubin Total: 0.3 (05-01)  Bilirubin Total, Serum: 0.3 (05-25)      Trend LDH      Auto Eosinophil %: 0.0 % (05-13-24 @ 00:43)      Urinalysis Basic - ( 13 May 2024 06:54 )    Color: x / Appearance: x / SG: x / pH: x  Gluc: 226 mg/dL / Ketone: x  / Bili: x / Urobili: x   Blood: x / Protein: x / Nitrite: x   Leuk Esterase: x / RBC: x / WBC x   Sq Epi: x / Non Sq Epi: x / Bacteria: x        MICROBIOLOGY:    Female    Urinalysis with Rflx Culture (collected 13 May 2024 06:23)    Troponin T, High Sensitivity Result: 37 (05-13)    Blood Gas Venous - Lactate: 1.9 (05-13 @ 00:16)        INFLAMMATORY MARKERS      RADIOLOGY & ADDITIONAL TESTS:  I have personally reviewed the imagings.  CXR      CT    < from: Xray Chest 1 View-PORTABLE IMMEDIATE (05.13.24 @ 01:33) >    INTERPRETATION:  Clinical History / Reason for exam: Shortness of breath    Comparison : Chest radiograph May 1, 2024.    Technique/Positioning: AP chest.    Findings:    Support devices: None.    Cardiac/mediastinum/hilum: Unremarkable.    Lung parenchyma/Pleura: Left lower lobe infiltrate.    Skeleton/soft tissues: Degenerative changes.    Impression:    Left lower lobe infiltrate.        --- End of Report ---      < end of copied text >  < from: Xray Chest 1 View- PORTABLE-Urgent (05.01.24 @ 10:32) >    INTERPRETATION:  Clinical History / Reason for exam: Sepsis    Comparison : Chest radiograph 5/25/2023.    Technique/Positioning: Frontal chest radiograph.    Findings:    Support devices: None.    Cardiac/mediastinum/hilum: Atherosclerotic aorta.    Lung parenchyma/Pleura: Left basilar/retrocardiac haziness, cannot   exclude opacity. No pneumothorax.    Skeleton/soft tissues: Elevated right hemidiaphragm. Degenerative changes.    Impression:    Left basilar/retrocardiac haziness, cannot exclude opacity.        --- End of Report ---    < end of copied text >    CARDIOLOGY TESTING  12 Lead ECG:   Ventricular Rate 108 BPM    Atrial Rate 108 BPM    P-R Interval 158 ms    QRS Duration 64 ms    Q-T Interval 330 ms    QTC Calculation(Bazett) 442 ms    P Axis 40 degrees    R Axis 17 degrees    T Axis 37 degrees    Diagnosis Line Sinus tachycardia  Anterior infarct , age undetermined  Abnormal ECG    Confirmed by Blaise Ellis ([id number]) on 5/13/2024 9:04:50 AM (05-13-24 @ 00:29)  12 Lead ECG:   Ventricular Rate 125 BPM    Atrial Rate 125 BPM    P-R Interval 162 ms    QRS Duration 74 ms    Q-T Interval 282 ms    QTC Calculation(Bazett) 407 ms    P Axis 61 degrees    R Axis -2 degrees    T Axis 76 degrees    Diagnosis Line Sinus tachycardia  Cannot rule out Anterior infarct , age undetermined  Abnormal ECG    Confirmed by DENIS ANAND MD ([id number]) on 5/1/2024 2:47:58 PM (05-01-24 @ 09:27)

## 2024-05-13 NOTE — H&P ADULT - HISTORY OF PRESENT ILLNESS
83 year-old female came accompanied by her daughter on admission. As per daughter  pt had  shortness of breath. Pt poor historian due to dementia,   with a past medical history of hyperlipidemia, diabetes, dementia usual a&ox1, COPD on 2 L  oxygen, and hypertension Per daughter, patient has been having shortness of breath and a fever since last nigh Tmax  100.5, Daughter denies cough or wheezing.  NO coughing with eating. Decreased PO intake for past few days

## 2024-05-13 NOTE — ED PROVIDER NOTE - OBJECTIVE STATEMENT
83-year-old female with a past medical history of hyperlipidemia, diabetes, dementia, COPD on 2.5 L of oxygen, and hypertension presents to the ED brought in by her daughter for evaluation of shortness of breath that began at 11 PM when the patient called her daughter into her room because she was having difficulty breathing.  Patient's daughter reports her oxygen is usually 93%.  Patient follows pulmonologist Dr. Cody.  Patient was recently admitted from May 1 to May 3 for community-acquired pneumonia and completed a course of ceftriaxone and azithromycin.  As per patient's daughter patient denies cough, chest pain, abdominal pain, nausea, vomiting, diarrhea, constipation, rashes, leg pain, leg swelling, recent travel, history of blood clots, or weakness.

## 2024-05-13 NOTE — PATIENT PROFILE ADULT - NSTRANSFERBELONGINGSRESP_GEN_A_NUR
SUBJECTIVE  Patient status post repair of right inguinal hernia with mesh.  Currently progressing well complains of mild swelling but otherwise denies any problems with the wound at this time.         Active Problems  Patient Active Problem List   Diagnosis   • Primary localized osteoarthritis of both knees   • Mild cognitive impairment   • Personal history of malignant neoplasm of prostate   • Pure hypercholesterolemia   • Solitary lung nodule   • Right groin pain   • Small bowel obstruction (CMS/HCC)   • Asthma   • Right inguinal hernia         Review  Past medical history, problem list, family medical history, surgical history and social history reviewed.       Current Meds  Current Outpatient Medications   Medication Sig Dispense Refill   • budesonide-formoterol (SYMBICORT) 80-4.5 MCG/ACT inhaler Inhale 2 puffs into the lungs 2 times daily. 10.2 g 12   • albuterol 108 (90 Base) MCG/ACT inhaler Inhale 2 puffs into the lungs every 4 hours as needed for Shortness of Breath or Wheezing. 1 Inhaler 5   • Alphagan P 0.1 % ophthalmic solution Place 1 drop into both eyes 2 times daily. 5 mL    • latanoprost (XALATAN) 0.005 % ophthalmic solution Place 1 drop into both eyes nightly. 2.5 mL    • aspirin 81 MG tablet Take 81 mg by mouth daily.       No current facility-administered medications for this visit.       Current Allergies    ALLERGIES:  No Known Allergies    Vitals  Visit Vitals  /72   Pulse 70   Ht 5' 6\" (1.676 m)   Wt 83.9 kg (185 lb)   BMI 29.86 kg/m²             OBJECTIVE  Exam reveals Steri-Strips intact right groin there is mild swelling present genitalia are all normal there is no scrotal seroma or hematoma.  Patient has a mild inguinal hernia on the left side which is stable does not require treatment at this time.  Stable large ventral hernia          Plan  Mario Alberto Brooks is a 82 year old male status post repair of right inguinal hernia with mesh.  Patient is healing well and will remove  Steri-Strips in 1 week.  Recommend follow-up in 3 months to evaluate ventral which is stable at this time.  Ventral hernia is very extensive and large and most likely require TAR and prolonged surgery not indicated at this time since patient has had the hernia for several years and has been stable with no obstructive symptoms.        Medical compliance with plan discussed and risks of non-compliance reviewed.    Patient education completed on disease process, etiology & prognosis.    Patient expresses understanding of the plan.    Return to clinic as clinically indicated as discussed with patient who verbalized understanding of & agreement with the plan.     Signatures  Pierce Rosales MD     yes

## 2024-05-13 NOTE — H&P ADULT - ASSESSMENT
acute respiratory failure  sepsis pnneumonia      ID consult    2 L bolus, cw IV hydration 30ml/h  -IV abx   -follow up bcx  -pulse ox   -cw oxygen   -procalcitonin   -duonebs  -cw home inhalers   -MRSA  -urine strep, legionella      #r/o UTI  UA pending  consider ucx  monitor for fever      #KORI  cr 1.5 , baseline 1.2  -gentle IV hydration 30  @l/h  -avoid nephrotoxic meds    #Anemia likely dilutional   no bleeding   hgb 9.2 (base line 11)     #hx DM   -hold PO meds  -ISS  -monitor FS    #HTN  cw nifedipine    #HLD  cw statin     #dementia  cw memantine     DVT prophylaxis -HSQ  CODE status FULL

## 2024-05-13 NOTE — ED PROVIDER NOTE - CRITICAL CARE ATTENDING CONTRIBUTION TO CARE
83-year-old female, history of COPD on home O2, dementia, recently admitted for pneumonia/UTI, brought in by EMS for shortness of breath and low pulse ox.  Exam shows alert febrile tachypneic patient, HEENT NCAT PERRL, neck supple, lungs decreased breath sounds, RR S1S2, abdomen soft NT +BS, no CCE.

## 2024-05-13 NOTE — ED PROVIDER NOTE - CARE PLAN
1 Principal Discharge DX:	COPD exacerbation  Secondary Diagnosis:	Pneumonia  Secondary Diagnosis:	Hypoxia  Secondary Diagnosis:	Elevated troponin

## 2024-05-13 NOTE — ED PROVIDER NOTE - PHYSICAL EXAMINATION
Physical Exam    Vital Signs: I have reviewed the initial vital signs.  Constitutional: well-nourished, appears stated age, no acute distress  Eyes: Conjunctiva pink, Sclera clear  Cardiovascular: S1 and S2, regular rate, regular rhythm, well-perfused extremities, radial pulses equal and 2+ b/l.   Respiratory: (+) tachypneic, clear to auscultation bilaterally no wheezing, rales and rhonchi. no accessory muscle use.   Gastrointestinal: soft, non-tender, nondistended abdomen, no pulsatile mass, no rebound, no guarding  Musculoskeletal: FROM of b/l upper and lower extremities, no lower extremity edema, no calf tenderness  Integumentary: warm, dry, no rash  Neurologic: pt is awake  Psychiatric: appropriate mood, appropriate affect

## 2024-05-13 NOTE — ED PROVIDER NOTE - CLINICAL SUMMARY MEDICAL DECISION MAKING FREE TEXT BOX
83-year-old female, history of COPD on home O2, dementia, recently admitted for pneumonia/UTI, brought in by EMS for shortness of breath and low pulse ox.  Placed on BiPAP in ED.  Labs noted for WBC 18, hemoglobin 9, BUN 25, creatinine 1.5, lactate 1.9, troponin 37, BNP 1119.  Nasal swab negative.  Chest x-ray left lower lobe opacity.  EKG sinus tach no acute changes.  Given nebs, Solu-Medrol, magnesium, cefepime and levofloxacin.  Will admit to telemetry.

## 2024-05-13 NOTE — H&P ADULT - NSHPLABSRESULTS_GEN_ALL_CORE
9.2    18.52 )-----------( 529      ( 13 May 2024 00:43 )             30.0       05-13    141  |  105  |  25<H>  ----------------------------<  147<H>  4.4   |  20  |  1.5    Ca    9.8      13 May 2024 00:43    TPro  6.6  /  Alb  3.6  /  TBili  0.3  /  DBili  x   /  AST  13  /  ALT  13  /  AlkPhos  74  05-13              Urinalysis Basic - ( 13 May 2024 00:43 )    Color: x / Appearance: x / SG: x / pH: x  Gluc: 147 mg/dL / Ketone: x  / Bili: x / Urobili: x   Blood: x / Protein: x / Nitrite: x   Leuk Esterase: x / RBC: x / WBC x   Sq Epi: x / Non Sq Epi: x / Bacteria: x        Lactate Trend        CAPILLARY BLOOD GLUCOSE            Culture Results:   >=3 organisms. Probable collection contamination. (05-01 @ 10:40)  Culture Results:   No growth at 5 days (05-01 @ 10:09)  Culture Results:   No growth at 5 days (05-01 @ 10:09)

## 2024-05-14 LAB
ALBUMIN SERPL ELPH-MCNC: 3.4 G/DL — LOW (ref 3.5–5.2)
ALP SERPL-CCNC: 70 U/L — SIGNIFICANT CHANGE UP (ref 30–115)
ALT FLD-CCNC: 14 U/L — SIGNIFICANT CHANGE UP (ref 0–41)
ANION GAP SERPL CALC-SCNC: 10 MMOL/L — SIGNIFICANT CHANGE UP (ref 7–14)
AST SERPL-CCNC: 14 U/L — SIGNIFICANT CHANGE UP (ref 0–41)
BASOPHILS # BLD AUTO: 0.02 K/UL — SIGNIFICANT CHANGE UP (ref 0–0.2)
BASOPHILS NFR BLD AUTO: 0.1 % — SIGNIFICANT CHANGE UP (ref 0–1)
BILIRUB SERPL-MCNC: 0.2 MG/DL — SIGNIFICANT CHANGE UP (ref 0.2–1.2)
BUN SERPL-MCNC: 27 MG/DL — HIGH (ref 10–20)
CALCIUM SERPL-MCNC: 9.6 MG/DL — SIGNIFICANT CHANGE UP (ref 8.4–10.5)
CHLORIDE SERPL-SCNC: 107 MMOL/L — SIGNIFICANT CHANGE UP (ref 98–110)
CO2 SERPL-SCNC: 24 MMOL/L — SIGNIFICANT CHANGE UP (ref 17–32)
CREAT SERPL-MCNC: 1.3 MG/DL — SIGNIFICANT CHANGE UP (ref 0.7–1.5)
EGFR: 40 ML/MIN/1.73M2 — LOW
EOSINOPHIL # BLD AUTO: 0 K/UL — SIGNIFICANT CHANGE UP (ref 0–0.7)
EOSINOPHIL NFR BLD AUTO: 0 % — SIGNIFICANT CHANGE UP (ref 0–8)
GLUCOSE BLDC GLUCOMTR-MCNC: 147 MG/DL — HIGH (ref 70–99)
GLUCOSE BLDC GLUCOMTR-MCNC: 176 MG/DL — HIGH (ref 70–99)
GLUCOSE BLDC GLUCOMTR-MCNC: 200 MG/DL — HIGH (ref 70–99)
GLUCOSE BLDC GLUCOMTR-MCNC: 307 MG/DL — HIGH (ref 70–99)
GLUCOSE SERPL-MCNC: 161 MG/DL — HIGH (ref 70–99)
HCT VFR BLD CALC: 27.7 % — LOW (ref 37–47)
HGB BLD-MCNC: 8.7 G/DL — LOW (ref 12–16)
IMM GRANULOCYTES NFR BLD AUTO: 0.5 % — HIGH (ref 0.1–0.3)
LEGIONELLA AG UR QL: NEGATIVE — SIGNIFICANT CHANGE UP
LYMPHOCYTES # BLD AUTO: 0.78 K/UL — LOW (ref 1.2–3.4)
LYMPHOCYTES # BLD AUTO: 4.7 % — LOW (ref 20.5–51.1)
MAGNESIUM SERPL-MCNC: 2.1 MG/DL — SIGNIFICANT CHANGE UP (ref 1.8–2.4)
MCHC RBC-ENTMCNC: 25.8 PG — LOW (ref 27–31)
MCHC RBC-ENTMCNC: 31.4 G/DL — LOW (ref 32–37)
MCV RBC AUTO: 82.2 FL — SIGNIFICANT CHANGE UP (ref 81–99)
MONOCYTES # BLD AUTO: 0.89 K/UL — HIGH (ref 0.1–0.6)
MONOCYTES NFR BLD AUTO: 5.4 % — SIGNIFICANT CHANGE UP (ref 1.7–9.3)
MRSA PCR RESULT.: NEGATIVE — SIGNIFICANT CHANGE UP
NEUTROPHILS # BLD AUTO: 14.78 K/UL — HIGH (ref 1.4–6.5)
NEUTROPHILS NFR BLD AUTO: 89.3 % — HIGH (ref 42.2–75.2)
NRBC # BLD: 0 /100 WBCS — SIGNIFICANT CHANGE UP (ref 0–0)
PLATELET # BLD AUTO: 451 K/UL — HIGH (ref 130–400)
PMV BLD: 9.4 FL — SIGNIFICANT CHANGE UP (ref 7.4–10.4)
POTASSIUM SERPL-MCNC: 3.8 MMOL/L — SIGNIFICANT CHANGE UP (ref 3.5–5)
POTASSIUM SERPL-SCNC: 3.8 MMOL/L — SIGNIFICANT CHANGE UP (ref 3.5–5)
PROT SERPL-MCNC: 6.2 G/DL — SIGNIFICANT CHANGE UP (ref 6–8)
RBC # BLD: 3.37 M/UL — LOW (ref 4.2–5.4)
RBC # FLD: 16.4 % — HIGH (ref 11.5–14.5)
S PNEUM AG UR QL: NEGATIVE — SIGNIFICANT CHANGE UP
SODIUM SERPL-SCNC: 141 MMOL/L — SIGNIFICANT CHANGE UP (ref 135–146)
WBC # BLD: 16.56 K/UL — HIGH (ref 4.8–10.8)
WBC # FLD AUTO: 16.56 K/UL — HIGH (ref 4.8–10.8)

## 2024-05-14 PROCEDURE — 99232 SBSQ HOSP IP/OBS MODERATE 35: CPT

## 2024-05-14 RX ADMIN — Medication 30 MILLIGRAM(S): at 12:26

## 2024-05-14 RX ADMIN — GABAPENTIN 100 MILLIGRAM(S): 400 CAPSULE ORAL at 22:14

## 2024-05-14 RX ADMIN — MEMANTINE HYDROCHLORIDE 10 MILLIGRAM(S): 10 TABLET ORAL at 17:46

## 2024-05-14 RX ADMIN — Medication 2: at 08:22

## 2024-05-14 RX ADMIN — HEPARIN SODIUM 5000 UNIT(S): 5000 INJECTION INTRAVENOUS; SUBCUTANEOUS at 22:15

## 2024-05-14 RX ADMIN — HEPARIN SODIUM 5000 UNIT(S): 5000 INJECTION INTRAVENOUS; SUBCUTANEOUS at 05:24

## 2024-05-14 RX ADMIN — Medication 60 MILLIGRAM(S): at 05:24

## 2024-05-14 RX ADMIN — MEMANTINE HYDROCHLORIDE 10 MILLIGRAM(S): 10 TABLET ORAL at 05:24

## 2024-05-14 RX ADMIN — Medication 8: at 12:26

## 2024-05-14 RX ADMIN — Medication 40 MILLIGRAM(S): at 05:25

## 2024-05-14 RX ADMIN — HEPARIN SODIUM 5000 UNIT(S): 5000 INJECTION INTRAVENOUS; SUBCUTANEOUS at 15:06

## 2024-05-14 RX ADMIN — Medication 40 MILLIGRAM(S): at 17:46

## 2024-05-14 RX ADMIN — SODIUM CHLORIDE 30 MILLILITER(S): 9 INJECTION INTRAMUSCULAR; INTRAVENOUS; SUBCUTANEOUS at 23:58

## 2024-05-14 RX ADMIN — ATORVASTATIN CALCIUM 40 MILLIGRAM(S): 80 TABLET, FILM COATED ORAL at 22:15

## 2024-05-14 RX ADMIN — CEFEPIME 100 MILLIGRAM(S): 1 INJECTION, POWDER, FOR SOLUTION INTRAMUSCULAR; INTRAVENOUS at 12:26

## 2024-05-14 NOTE — PROGRESS NOTE ADULT - ASSESSMENT
This is a 83 year-old female with PMH of dementia, hyperlipidemia, diabetes, COPD on 2 L  oxygen, and hypertension is here for shortness of breath and a fever. Admitted to Medicine for management of acute respiratory failure.    Problem List:     #Acute on chronic hypoxic respiratory failure  #SOB/fever possibly 2/2 pneumonia  #COPD exacerbation (home 2LNC)    #HTN/HLD  #T2DM  #KORI on CKD 3   #AOCKD   #Obesity (BMI 33)  #Dementia        Plan:  2 L bolus given in the ED  CXR: Left lower lobe infiltrate   cr 1.5 , baseline 1.2  ID recs appreciated   -c/w Cefepime 2g IV q12h  -c/w IVF   -follow up BCx, UCx  -MRSA recent negative   -f/u atypical PNA labs   -supplemental oxygen to maintain PO2 88-92%; wean to home 2LNC if tolerated   -Duonebs q6h PRN  -c/w home inhaler  (symbicort 2 puffs BID)  -Methylpred 40mg q12 for now; titrate to Pred 40mg within 2 days   -avoid nephrotoxic meds / renally dose medications / monitor renal fxn daily   -hold PO T2DM home meds  -ISS for glucose >200 / monitor FS TIDAC/HS   -c/w nifedipine  -c/w statin   -c/w memantine       DVT prophylaxis: HSQ  CODE status FULL

## 2024-05-14 NOTE — PROGRESS NOTE ADULT - SUBJECTIVE AND OBJECTIVE BOX
SUBJECTIVE:    Patient is a 83y old Female who presents with a chief complaint of shortness of breath (13 May 2024 15:02)    Currently admitted to medicine with the primary diagnosis of COPD exacerbation       Today is hospital day 1d. This morning she is resting comfortably in bed and reports no new issues or overnight events. Endorsing continued SOB. No other acute complaints.    ROS:   CONSTITUTIONAL: No weakness, fevers or chills   EYES/ENT: No visual changes; No vertigo or throat pain   NECK: No pain or stiffness   RESPIRATORY: No cough, wheezing, hemoptysis; No shortness of breath   CARDIOVASCULAR: No chest pain or palpitations   GASTROINTESTINAL: No abdominal or epigastric pain. No nausea, vomiting, or hematemesis; No diarrhea or constipation. No melena or hematochezia.  GENITOURINARY: No dysuria, frequency or hematuria  NEUROLOGICAL: No numbness or weakness  SKIN: No itching, rashes      PAST MEDICAL & SURGICAL HISTORY  COPD (chronic obstructive pulmonary disease)    Diabetes mellitus, type 2    High blood cholesterol    Essential hypertension    Dementia    Depression    No significant past surgical history        SOCIAL HISTORY:  Negative for smoking/alcohol/drug use.     ALLERGIES:  No Known Allergies      MEDICATIONS:  STANDING MEDICATIONS  atorvastatin 40 milliGRAM(s) Oral at bedtime  budesonide 160 MICROgram(s)/formoterol 4.5 MICROgram(s) Inhaler 2 Puff(s) Inhalation two times a day  cefepime   IVPB 2000 milliGRAM(s) IV Intermittent every 12 hours  dextrose 10% Bolus 125 milliLiter(s) IV Bolus once  dextrose 5%. 1000 milliLiter(s) IV Continuous <Continuous>  dextrose 5%. 1000 milliLiter(s) IV Continuous <Continuous>  dextrose 50% Injectable 12.5 Gram(s) IV Push once  dextrose 50% Injectable 25 Gram(s) IV Push once  gabapentin 100 milliGRAM(s) Oral at bedtime  glucagon  Injectable 1 milliGRAM(s) IntraMuscular once  heparin   Injectable 5000 Unit(s) SubCutaneous every 8 hours  insulin lispro (ADMELOG) corrective regimen sliding scale   SubCutaneous three times a day before meals  memantine 10 milliGRAM(s) Oral two times a day  methylPREDNISolone sodium succinate Injectable 40 milliGRAM(s) IV Push every 12 hours  NIFEdipine XL 60 milliGRAM(s) Oral daily  PARoxetine 30 milliGRAM(s) Oral daily  sodium chloride 0.9%. 1000 milliLiter(s) IV Continuous <Continuous>    PRN MEDICATIONS  acetaminophen     Tablet .. 650 milliGRAM(s) Oral every 6 hours PRN  albuterol/ipratropium for Nebulization 3 milliLiter(s) Nebulizer every 6 hours PRN  aluminum hydroxide/magnesium hydroxide/simethicone Suspension 30 milliLiter(s) Oral every 4 hours PRN  dextrose Oral Gel 15 Gram(s) Oral once PRN  ondansetron Injectable 4 milliGRAM(s) IV Push every 6 hours PRN    VITALS:   T(F): 98.2  HR: 71  BP: 159/76  RR: 20  SpO2: 98%    LABS:                          8.7    16.56 )-----------( 451      ( 14 May 2024 06:25 )             27.7     05-14    141  |  107  |  27<H>  ----------------------------<  161<H>  3.8   |  24  |  1.3    Ca    9.6      14 May 2024 06:25  Mg     2.1     05-14    TPro  6.2  /  Alb  3.4<L>  /  TBili  0.2  /  DBili  x   /  AST  14  /  ALT  14  /  AlkPhos  70  05-14      Urinalysis Basic - ( 14 May 2024 06:25 )    Color: x / Appearance: x / SG: x / pH: x  Gluc: 161 mg/dL / Ketone: x  / Bili: x / Urobili: x   Blood: x / Protein: x / Nitrite: x   Leuk Esterase: x / RBC: x / WBC x   Sq Epi: x / Non Sq Epi: x / Bacteria: x    Urinalysis with Rflx Culture (collected 13 May 2024 06:23)        RADIOLOGY:    < from: Xray Chest 1 View-PORTABLE IMMEDIATE (05.13.24 @ 01:33) >  Impression:    Left lower lobe infiltrate.        --- End of Report ---    < end of copied text >      PHYSICAL EXAM:  GENERAL:  82y/o Female NAD, resting comfortably.  HEAD:  Atraumatic, Normocephalic  EYES: EOMI, PERRLA, conjunctiva and sclera clear  NECK: Supple, No JVD, no cervical lymphadenopathy, non-tender  CHEST/LUNG: shallow breathing  bilaterally; + wheeze,+ rhonchi, no  rales  HEART: Regular rate and rhythm; S1&S2  ABDOMEN: Soft, Nontender, Nondistended x 4 quadrants; Bowel sounds present  EXTREMITIES:   Peripheral Pulses Present, No clubbing, no cyanosis, or no edema, no calf tenderness  PSYCH:  demented  NEUROLOGY: at base line  SKIN:       ASSESSMENT AND PLAN:

## 2024-05-14 NOTE — PROGRESS NOTE ADULT - SUBJECTIVE AND OBJECTIVE BOX
SUBJECTIVE:    Patient is a 83y old Female who presents with a chief complaint of shortness of breath (13 May 2024 15:02)    Currently admitted to medicine with the primary diagnosis of COPD exacerbation       Today is hospital day 1d. This morning she is resting comfortably in bed and reports no new issues or overnight events. Daughter at bedside and plan of care discussed extensively. Stating she is feeling a lot better. No other acute complaints.    ROS:   CONSTITUTIONAL: No weakness, fevers or chills   EYES/ENT: No visual changes; No vertigo or throat pain   NECK: No pain or stiffness   RESPIRATORY: No cough, wheezing, hemoptysis; No shortness of breath   CARDIOVASCULAR: No chest pain or palpitations   GASTROINTESTINAL: No abdominal or epigastric pain. No nausea, vomiting, or hematemesis; No diarrhea or constipation. No melena or hematochezia.  GENITOURINARY: No dysuria, frequency or hematuria  NEUROLOGICAL: No numbness or weakness  SKIN: No itching, rashes      PAST MEDICAL & SURGICAL HISTORY  COPD (chronic obstructive pulmonary disease)    Diabetes mellitus, type 2    High blood cholesterol    Essential hypertension    Dementia    Depression    No significant past surgical history        SOCIAL HISTORY:  Negative for smoking/alcohol/drug use.     ALLERGIES:  No Known Allergies      MEDICATIONS:  STANDING MEDICATIONS  atorvastatin 40 milliGRAM(s) Oral at bedtime  budesonide 160 MICROgram(s)/formoterol 4.5 MICROgram(s) Inhaler 2 Puff(s) Inhalation two times a day  cefepime   IVPB 2000 milliGRAM(s) IV Intermittent every 12 hours  dextrose 10% Bolus 125 milliLiter(s) IV Bolus once  dextrose 5%. 1000 milliLiter(s) IV Continuous <Continuous>  dextrose 5%. 1000 milliLiter(s) IV Continuous <Continuous>  dextrose 50% Injectable 12.5 Gram(s) IV Push once  dextrose 50% Injectable 25 Gram(s) IV Push once  gabapentin 100 milliGRAM(s) Oral at bedtime  glucagon  Injectable 1 milliGRAM(s) IntraMuscular once  heparin   Injectable 5000 Unit(s) SubCutaneous every 8 hours  insulin lispro (ADMELOG) corrective regimen sliding scale   SubCutaneous three times a day before meals  memantine 10 milliGRAM(s) Oral two times a day  methylPREDNISolone sodium succinate Injectable 40 milliGRAM(s) IV Push every 12 hours  NIFEdipine XL 60 milliGRAM(s) Oral daily  PARoxetine 30 milliGRAM(s) Oral daily  sodium chloride 0.9%. 1000 milliLiter(s) IV Continuous <Continuous>    PRN MEDICATIONS  acetaminophen     Tablet .. 650 milliGRAM(s) Oral every 6 hours PRN  albuterol/ipratropium for Nebulization 3 milliLiter(s) Nebulizer every 6 hours PRN  aluminum hydroxide/magnesium hydroxide/simethicone Suspension 30 milliLiter(s) Oral every 4 hours PRN  dextrose Oral Gel 15 Gram(s) Oral once PRN  ondansetron Injectable 4 milliGRAM(s) IV Push every 6 hours PRN    VITALS:   Vital Signs Last 24 Hrs  T(C): 36.6 (14 May 2024 14:34), Max: 36.8 (14 May 2024 04:41)  T(F): 97.8 (14 May 2024 14:34), Max: 98.2 (14 May 2024 04:41)  HR: 84 (14 May 2024 14:34) (71 - 84)  BP: 117/66 (14 May 2024 14:34) (117/66 - 159/76)  BP(mean): --  RR: 20 (14 May 2024 04:41) (20 - 20)  SpO2: 94% (14 May 2024 14:34) (94% - 98%)    Parameters below as of 14 May 2024 14:34  Patient On (Oxygen Delivery Method): nasal cannula  O2 Flow (L/min): 2      LABS:                          8.7    16.56 )-----------( 451      ( 14 May 2024 06:25 )             27.7     05-14    141  |  107  |  27<H>  ----------------------------<  161<H>  3.8   |  24  |  1.3    Ca    9.6      14 May 2024 06:25  Mg     2.1     05-14    TPro  6.2  /  Alb  3.4<L>  /  TBili  0.2  /  DBili  x   /  AST  14  /  ALT  14  /  AlkPhos  70  05-14      Urinalysis Basic - ( 14 May 2024 06:25 )    Color: x / Appearance: x / SG: x / pH: x  Gluc: 161 mg/dL / Ketone: x  / Bili: x / Urobili: x   Blood: x / Protein: x / Nitrite: x   Leuk Esterase: x / RBC: x / WBC x   Sq Epi: x / Non Sq Epi: x / Bacteria: x    Urinalysis with Rflx Culture (collected 13 May 2024 06:23)        RADIOLOGY:    < from: Xray Chest 1 View-PORTABLE IMMEDIATE (05.13.24 @ 01:33) >  Impression:    Left lower lobe infiltrate.        --- End of Report ---    < end of copied text >      PHYSICAL EXAM:  GENERAL:  84y/o Female NAD, resting comfortably.  HEAD:  Atraumatic, Normocephalic  EYES: EOMI, PERRLA, conjunctiva and sclera clear  NECK: Supple, No JVD, no cervical lymphadenopathy, non-tender  CHEST/LUNG: shallow breathing  bilaterally; + wheeze,+ rhonchi, no  rales  HEART: Regular rate and rhythm; S1&S2  ABDOMEN: Soft, Nontender, Nondistended x 4 quadrants; Bowel sounds present  EXTREMITIES:   Peripheral Pulses Present, No clubbing, no cyanosis, or no edema, no calf tenderness  PSYCH:  demented  NEUROLOGY: at base line  SKIN:       ASSESSMENT AND PLAN:

## 2024-05-14 NOTE — PROGRESS NOTE ADULT - ASSESSMENT
This is a 83 year-old female with PMH of dementia, hyperlipidemia, diabetes, COPD on 2 L  oxygen, and hypertension is here for shortness of breath and a fever.    IMPRESSION  #Acute on chronic hypoxic respiratory failure.  #Concern for gram negative pneumonia  #COPD on home oxygen. Possible exacerbation  #Pyuria- Urine cultures pending. Doubt UTI.   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  #Recent MRSA nares negative.     RECOMMENDATIONS  -Recently managed for COPD exacerbation and possible CAP (Ceftriaxone/Azith)  -Continue with cefepime 2 gram q 12 hours for now.   -Steroids as per the primary team.  -Off loading to prevent pressure sores and preventive measures to avoid aspiration and delirium precautions.   -Patient will benefit from PCV 20 and RSV vaccines in 1-2 months outpatient.     If any questions, please send a message or call on [employer] Teams  Please continue to update ID with any pertinent new laboratory or radiographic findings.    Diony Clemente M.D  Infectious Diseases Attending/   [employer] at [facility]

## 2024-05-14 NOTE — PROGRESS NOTE ADULT - SUBJECTIVE AND OBJECTIVE BOX
SABRINA DOMINGUEZ  83y, Female    LOS  1d    INTERVAL EVENTS/HPI  - No acute events overnight  - T(F): , Max: 98.2 (05-14-24 @ 04:41)  - WBC Count: 16.56 (05-14-24 @ 06:25)  WBC Count: 14.16 (05-13-24 @ 06:54)  - Creatinine: 1.3 (05-14-24 @ 06:25)  Creatinine: 1.4 (05-13-24 @ 06:54)    REVIEW OF SYSTEMS:  CONSTITUTIONAL: No fever or chills  HEENT: No sore throat  RESPIRATORY: No cough, no shortness of breath  CARDIOVASCULAR: No chest pain or palpitations  GASTROINTESTINAL: No abdominal or epigastric pain  GENITOURINARY: No dysuria  NEUROLOGICAL: No headache/dizziness  MSK: No joint pain, erythema, or swelling; no back pain  SKIN: No itching, rashes  All other ROS negative except noted above    Prior hospital charts reviewed [Yes]  Primary team notes reviewed [Yes]  Other consultant notes reviewed [Yes]    ANTIMICROBIALS:   cefepime   IVPB 2000 every 12 hours      OTHER MEDS: MEDICATIONS  (STANDING):  acetaminophen     Tablet .. 650 every 6 hours PRN  albuterol/ipratropium for Nebulization 3 every 6 hours PRN  aluminum hydroxide/magnesium hydroxide/simethicone Suspension 30 every 4 hours PRN  atorvastatin 40 at bedtime  budesonide 160 MICROgram(s)/formoterol 4.5 MICROgram(s) Inhaler 2 two times a day  dextrose 50% Injectable 25 once  dextrose 50% Injectable 12.5 once  dextrose Oral Gel 15 once PRN  gabapentin 100 at bedtime  glucagon  Injectable 1 once  heparin   Injectable 5000 every 8 hours  insulin lispro (ADMELOG) corrective regimen sliding scale  three times a day before meals  memantine 10 two times a day  methylPREDNISolone sodium succinate Injectable 40 every 12 hours  NIFEdipine XL 60 daily  ondansetron Injectable 4 every 6 hours PRN  PARoxetine 30 daily      Vital Signs Last 24 Hrs  T(F): 98.2 (05-14-24 @ 04:41), Max: 102.6 (05-12-24 @ 23:59)    Vital Signs Last 24 Hrs  HR: 71 (05-14-24 @ 04:41) (68 - 82)  BP: 159/76 (05-14-24 @ 04:41) (135/70 - 160/72)  RR: 20 (05-14-24 @ 04:41)  SpO2: 98% (05-14-24 @ 04:41) (95% - 99%)  Wt(kg): --    EXAM:  GENERAL: On NC.   HEAD: No head lesions  NECK: Supple  CHEST/LUNG: Shallow breath sounds.   HEART: S1 S2  ABDOMEN: Soft, nontender  EXTREMITIES: No clubbing, cyanosis, or petal edema  NERVOUS SYSTEM: Alert and oriented to person  MSK: No joint erythema, swelling or pain  SKIN: No rashes or lesions, no superficial thrombophlebitis    Labs:                        8.7    16.56 )-----------( 451      ( 14 May 2024 06:25 )             27.7     05-14    141  |  107  |  27<H>  ----------------------------<  161<H>  3.8   |  24  |  1.3    Ca    9.6      14 May 2024 06:25  Mg     2.1     05-14    TPro  6.2  /  Alb  3.4<L>  /  TBili  0.2  /  DBili  x   /  AST  14  /  ALT  14  /  AlkPhos  70  05-14      WBC Trend:  WBC Count: 16.56 (05-14-24 @ 06:25)  WBC Count: 14.16 (05-13-24 @ 06:54)  WBC Count: 18.52 (05-13-24 @ 00:43)      Creatine Trend:  Creatinine: 1.3 (05-14)  Creatinine: 1.4 (05-13)  Creatinine: 1.5 (05-13)      Liver Biochemical Testing Trend:  Alanine Aminotransferase (ALT/SGPT): 14 (05-14)  Alanine Aminotransferase (ALT/SGPT): 13 (05-13)  Alanine Aminotransferase (ALT/SGPT): 35 (05-03)  Alanine Aminotransferase (ALT/SGPT): 26 (05-02)  Alanine Aminotransferase (ALT/SGPT): 21 (05-01)  Aspartate Aminotransferase (AST/SGOT): 14 (05-14-24 @ 06:25)  Aspartate Aminotransferase (AST/SGOT): 13 (05-13-24 @ 00:43)  Aspartate Aminotransferase (AST/SGOT): 33 (05-03-24 @ 07:13)  Aspartate Aminotransferase (AST/SGOT): 34 (05-02-24 @ 06:12)  Aspartate Aminotransferase (AST/SGOT): 34 (05-01-24 @ 10:09)  Bilirubin Total: 0.2 (05-14)  Bilirubin Total: 0.3 (05-13)  Bilirubin Total: <0.2 (05-03)  Bilirubin Total: <0.2 (05-02)  Bilirubin Total: 0.3 (05-01)      Trend LDH      Urinalysis Basic - ( 14 May 2024 06:25 )    Color: x / Appearance: x / SG: x / pH: x  Gluc: 161 mg/dL / Ketone: x  / Bili: x / Urobili: x   Blood: x / Protein: x / Nitrite: x   Leuk Esterase: x / RBC: x / WBC x   Sq Epi: x / Non Sq Epi: x / Bacteria: x        MICROBIOLOGY:    Female    Urinalysis with Rflx Culture (collected 13 May 2024 06:23)    Urinalysis with Rflx Culture (collected 01 May 2024 10:40)    Culture - Urine (collected 01 May 2024 10:40)  Source: Catheterized None  Final Report:    >=3 organisms. Probable collection contamination.    Culture - Blood (collected 01 May 2024 10:09)  Source: .Blood Blood-Peripheral  Final Report:    No growth at 5 days    Culture - Blood (collected 01 May 2024 10:09)  Source: .Blood Blood-Peripheral  Final Report:    No growth at 5 days    Culture - Urine (collected 19 May 2023 16:31)  Source: Clean Catch Clean Catch (Midstream)  Final Report:    No growth      Procalcitonin: 7.90 (05-13)      Troponin T, High Sensitivity Result: 37 (05-13)    Blood Gas Venous - Lactate: 1.9 (05-13 @ 00:16)        RADIOLOGY & ADDITIONAL TESTS:  I have personally reviewed the relevant images.   CXR      CT    < from: Xray Chest 1 View-PORTABLE IMMEDIATE (05.13.24 @ 01:33) >    INTERPRETATION:  Clinical History / Reason for exam: Shortness of breath    Comparison : Chest radiograph May 1, 2024.    Technique/Positioning: AP chest.    Findings:    Support devices: None.    Cardiac/mediastinum/hilum: Unremarkable.    Lung parenchyma/Pleura: Left lower lobe infiltrate.    Skeleton/soft tissues: Degenerative changes.    Impression:    Left lower lobe infiltrate.        --- End of Report ---    < end of copied text >      WEIGHT  Weight (kg): 79.1 (05-13-24 @ 14:25)  Creatinine: 1.3 mg/dL (05-14-24 @ 06:25)      All available historical records have been reviewed

## 2024-05-15 LAB
-  AMOXICILLIN/CLAVULANIC ACID: SIGNIFICANT CHANGE UP
-  AMPICILLIN/SULBACTAM: SIGNIFICANT CHANGE UP
-  AMPICILLIN: SIGNIFICANT CHANGE UP
-  AZTREONAM: SIGNIFICANT CHANGE UP
-  CEFAZOLIN: SIGNIFICANT CHANGE UP
-  CEFEPIME: SIGNIFICANT CHANGE UP
-  CEFTRIAXONE: SIGNIFICANT CHANGE UP
-  CEFUROXIME: SIGNIFICANT CHANGE UP
-  CIPROFLOXACIN: SIGNIFICANT CHANGE UP
-  ERTAPENEM: SIGNIFICANT CHANGE UP
-  GENTAMICIN: SIGNIFICANT CHANGE UP
-  IMIPENEM: SIGNIFICANT CHANGE UP
-  LEVOFLOXACIN: SIGNIFICANT CHANGE UP
-  MEROPENEM: SIGNIFICANT CHANGE UP
-  NITROFURANTOIN: SIGNIFICANT CHANGE UP
-  PIPERACILLIN/TAZOBACTAM: SIGNIFICANT CHANGE UP
-  TOBRAMYCIN: SIGNIFICANT CHANGE UP
-  TRIMETHOPRIM/SULFAMETHOXAZOLE: SIGNIFICANT CHANGE UP
CULTURE RESULTS: ABNORMAL
GLUCOSE BLDC GLUCOMTR-MCNC: 148 MG/DL — HIGH (ref 70–99)
GLUCOSE BLDC GLUCOMTR-MCNC: 186 MG/DL — HIGH (ref 70–99)
GLUCOSE BLDC GLUCOMTR-MCNC: 224 MG/DL — HIGH (ref 70–99)
GLUCOSE BLDC GLUCOMTR-MCNC: 228 MG/DL — HIGH (ref 70–99)
METHOD TYPE: SIGNIFICANT CHANGE UP
ORGANISM # SPEC MICROSCOPIC CNT: ABNORMAL
ORGANISM # SPEC MICROSCOPIC CNT: SIGNIFICANT CHANGE UP
SPECIMEN SOURCE: SIGNIFICANT CHANGE UP
TROPONIN T, HIGH SENSITIVITY RESULT: 26 NG/L — HIGH (ref 6–13)

## 2024-05-15 PROCEDURE — 99232 SBSQ HOSP IP/OBS MODERATE 35: CPT

## 2024-05-15 RX ADMIN — Medication 2: at 12:22

## 2024-05-15 RX ADMIN — Medication 40 MILLIGRAM(S): at 17:12

## 2024-05-15 RX ADMIN — CEFEPIME 100 MILLIGRAM(S): 1 INJECTION, POWDER, FOR SOLUTION INTRAMUSCULAR; INTRAVENOUS at 23:00

## 2024-05-15 RX ADMIN — HEPARIN SODIUM 5000 UNIT(S): 5000 INJECTION INTRAVENOUS; SUBCUTANEOUS at 21:18

## 2024-05-15 RX ADMIN — MEMANTINE HYDROCHLORIDE 10 MILLIGRAM(S): 10 TABLET ORAL at 05:53

## 2024-05-15 RX ADMIN — HEPARIN SODIUM 5000 UNIT(S): 5000 INJECTION INTRAVENOUS; SUBCUTANEOUS at 14:08

## 2024-05-15 RX ADMIN — Medication 60 MILLIGRAM(S): at 05:54

## 2024-05-15 RX ADMIN — CEFEPIME 100 MILLIGRAM(S): 1 INJECTION, POWDER, FOR SOLUTION INTRAMUSCULAR; INTRAVENOUS at 00:03

## 2024-05-15 RX ADMIN — Medication 4: at 17:08

## 2024-05-15 RX ADMIN — Medication 40 MILLIGRAM(S): at 05:54

## 2024-05-15 RX ADMIN — MEMANTINE HYDROCHLORIDE 10 MILLIGRAM(S): 10 TABLET ORAL at 17:09

## 2024-05-15 RX ADMIN — GABAPENTIN 100 MILLIGRAM(S): 400 CAPSULE ORAL at 21:17

## 2024-05-15 RX ADMIN — Medication 30 MILLIGRAM(S): at 12:23

## 2024-05-15 RX ADMIN — HEPARIN SODIUM 5000 UNIT(S): 5000 INJECTION INTRAVENOUS; SUBCUTANEOUS at 05:54

## 2024-05-15 RX ADMIN — ATORVASTATIN CALCIUM 40 MILLIGRAM(S): 80 TABLET, FILM COATED ORAL at 21:18

## 2024-05-15 RX ADMIN — CEFEPIME 100 MILLIGRAM(S): 1 INJECTION, POWDER, FOR SOLUTION INTRAMUSCULAR; INTRAVENOUS at 12:23

## 2024-05-15 NOTE — DIETITIAN INITIAL EVALUATION ADULT - ORAL INTAKE PTA/DIET HISTORY
unable to assess at this time    Pt is presently on a minced and moist diet as per SLP eval, tolerating well

## 2024-05-15 NOTE — PHYSICAL THERAPY INITIAL EVALUATION ADULT - GENERAL OBSERVATIONS, REHAB EVAL
13:30 -14:00 Chart reviewed. Order received.  Patient is ok to be  seen for Pt, confirmed with RN. pt encountered  Semi owens in the bed  non verbal indicator of  pain is absent , and agrees to participate in session, +  Heplock , + O2 2  LPM via NC  , + Prima fit /tele ,NAD.

## 2024-05-15 NOTE — DIETITIAN INITIAL EVALUATION ADULT - CALCULATED TO (ML/KG)
What Is The Reason For Today's Visit?: History of Non-Melanoma Skin Cancer How Many Skin Cancers Have You Had?: more than one When Was Your Last Cancer Diagnosed?: 10/19/2021 [id number]

## 2024-05-15 NOTE — PHYSICAL THERAPY INITIAL EVALUATION ADULT - ADDITIONAL COMMENTS
PT lives W/ Daughter and grand daughter  W/ No Steps to enter, 5 steps to living area W/ RT HR and 5  Steps to bedroom W/ LT HR , s per Grand daughter pt requires physical assistance W/ All functional activity PTA  depend on day, doesn't use AD at home, information obtain from the PMR

## 2024-05-15 NOTE — DIETITIAN INITIAL EVALUATION ADULT - PERTINENT LABORATORY DATA
05-14    141  |  107  |  27<H>  ----------------------------<  161<H>  3.8   |  24  |  1.3    Ca    9.6      14 May 2024 06:25  Mg     2.1     05-14    TPro  6.2  /  Alb  3.4<L>  /  TBili  0.2  /  DBili  x   /  AST  14  /  ALT  14  /  AlkPhos  70  05-14  POCT Blood Glucose.: 186 mg/dL (05-15-24 @ 12:10)  A1C with Estimated Average Glucose Result: 7.7 % (05-13-24 @ 06:54)                          8.7    16.56 )-----------( 451      ( 14 May 2024 06:25 )             27.7

## 2024-05-15 NOTE — DIETITIAN INITIAL EVALUATION ADULT - OTHER INFO
pt is 83 year old female with hx of dementia, DM. COPD, HTN, recent admission for PNA p/w SOB and temp admitted with ARF sepsis 2/2 PNA r/o UTI

## 2024-05-15 NOTE — DIETITIAN INITIAL EVALUATION ADULT - PERTINENT MEDS FT
BUE 3+/5; trunk 3-/5 and BLE 2+/5/grossly assessed due to MEDICATIONS  (STANDING):  atorvastatin 40 milliGRAM(s) Oral at bedtime  budesonide 160 MICROgram(s)/formoterol 4.5 MICROgram(s) Inhaler 2 Puff(s) Inhalation two times a day  cefepime   IVPB 2000 milliGRAM(s) IV Intermittent every 12 hours  gabapentin 100 milliGRAM(s) Oral at bedtime  insulin lispro (ADMELOG) corrective regimen sliding scale   SubCutaneous three times a day before meals  memantine 10 milliGRAM(s) Oral two times a day  methylPREDNISolone sodium succinate Injectable 40 milliGRAM(s) IV Push every 24 hours  NIFEdipine XL 60 milliGRAM(s) Oral daily  PARoxetine 30 milliGRAM(s) Oral daily    MEDICATIONS  (PRN):  acetaminophen     Tablet .. 650 milliGRAM(s) Oral every 6 hours PRN Temp greater or equal to 38C (100.4F), Mild Pain (1 - 3)  albuterol/ipratropium for Nebulization 3 milliLiter(s) Nebulizer every 6 hours PRN Shortness of Breath and/or Wheezing  aluminum hydroxide/magnesium hydroxide/simethicone Suspension 30 milliLiter(s) Oral every 4 hours PRN Dyspepsia  dextrose Oral Gel 15 Gram(s) Oral once PRN Blood Glucose LESS THAN 70 milliGRAM(s)/deciliter  ondansetron Injectable 4 milliGRAM(s) IV Push every 6 hours PRN Nausea and/or Vomiting

## 2024-05-15 NOTE — PROGRESS NOTE ADULT - SUBJECTIVE AND OBJECTIVE BOX
Progress note    INTERVAL HPI/OVERNIGHT EVENTS:    Patient seen and examined and examined at bedside. She is a poor historian and does not express any pain when asked.      REVIEW OF SYSTEMS:  All other 13 Review of systems were reviewed and are negative    FAMILY HISTORY:  No pertinent family history in first degree relatives      T(C): 36.6 (05-15-24 @ 05:09), Max: 37.1 (05-14-24 @ 21:00)  HR: 68 (05-15-24 @ 05:09) (68 - 99)  BP: 153/75 (05-15-24 @ 05:09) (117/66 - 153/75)  RR: 18 (05-15-24 @ 05:09) (18 - 20)  SpO2: 98% (05-15-24 @ 08:26) (94% - 98%)  Wt(kg): --Vital Signs Last 24 Hrs  T(C): 36.6 (15 May 2024 05:09), Max: 37.1 (14 May 2024 21:00)  T(F): 97.8 (15 May 2024 05:09), Max: 98.7 (14 May 2024 21:00)  HR: 68 (15 May 2024 05:09) (68 - 99)  BP: 153/75 (15 May 2024 05:09) (117/66 - 153/75)  BP(mean): --  RR: 18 (15 May 2024 05:09) (18 - 20)  SpO2: 98% (15 May 2024 08:26) (94% - 98%)    Parameters below as of 15 May 2024 08:26  Patient On (Oxygen Delivery Method): nasal cannula  O2 Flow (L/min): 2.5    No Known Allergies      PHYSICAL EXAM:    GENERAL:  82y/o Female NAD, resting comfortably.  HEAD:  Atraumatic, Normocephalic  EYES: EOMI, PERRLA, conjunctiva and sclera clear  NECK: Supple, No JVD, no cervical lymphadenopathy, non-tender  CHEST/LUNG: shallow breathing  bilaterally; + wheeze,+ rhonchi, no  rales  HEART: Regular rate and rhythm; S1&S2  ABDOMEN: Soft, Nontender, Nondistended x 4 quadrants; Bowel sounds present  EXTREMITIES:   Peripheral Pulses Present, No clubbing, no cyanosis, or no edema, no calf tenderness  PSYCH:  demented  NEUROLOGY: at base line    Consultant(s) Notes Reviewed:  [x ] YES  [ ] NO  Care Discussed with Consultants/Other Providers [ x] YES  [ ] NO    LABS:      RADIOLOGY & ADDITIONAL TESTS:    Imaging Personally Reviewed:  [ ] YES  [ ] NO  acetaminophen     Tablet .. 650 milliGRAM(s) Oral every 6 hours PRN  albuterol/ipratropium for Nebulization 3 milliLiter(s) Nebulizer every 6 hours PRN  aluminum hydroxide/magnesium hydroxide/simethicone Suspension 30 milliLiter(s) Oral every 4 hours PRN  atorvastatin 40 milliGRAM(s) Oral at bedtime  budesonide 160 MICROgram(s)/formoterol 4.5 MICROgram(s) Inhaler 2 Puff(s) Inhalation two times a day  cefepime   IVPB 2000 milliGRAM(s) IV Intermittent every 12 hours  dextrose 10% Bolus 125 milliLiter(s) IV Bolus once  dextrose 5%. 1000 milliLiter(s) IV Continuous <Continuous>  dextrose 5%. 1000 milliLiter(s) IV Continuous <Continuous>  dextrose 50% Injectable 25 Gram(s) IV Push once  dextrose 50% Injectable 12.5 Gram(s) IV Push once  dextrose Oral Gel 15 Gram(s) Oral once PRN  gabapentin 100 milliGRAM(s) Oral at bedtime  glucagon  Injectable 1 milliGRAM(s) IntraMuscular once  heparin   Injectable 5000 Unit(s) SubCutaneous every 8 hours  insulin lispro (ADMELOG) corrective regimen sliding scale   SubCutaneous three times a day before meals  memantine 10 milliGRAM(s) Oral two times a day  methylPREDNISolone sodium succinate Injectable 40 milliGRAM(s) IV Push every 12 hours  NIFEdipine XL 60 milliGRAM(s) Oral daily  ondansetron Injectable 4 milliGRAM(s) IV Push every 6 hours PRN  PARoxetine 30 milliGRAM(s) Oral daily  sodium chloride 0.9%. 1000 milliLiter(s) IV Continuous <Continuous>      HEALTH ISSUES - PROBLEM Dx:      This is a 83 year-old female with PMH of dementia, hyperlipidemia, diabetes, COPD on 2 L  oxygen, and hypertension is here for shortness of breath and a fever. Admitted to Medicine for management of acute respiratory failure.    Problem List:     #Acute on chronic hypoxic respiratory failure  #SOB/fever possibly 2/2 pneumonia  #COPD exacerbation (home 2LNC)    #HTN/HLD  #T2DM  #KORI on CKD 3   #AOCKD   #Obesity (BMI 33)  #Dementia      Plan:  2 L bolus given in the ED  CXR: Left lower lobe infiltrate   cr 1.5 , baseline 1.2  ID recs appreciated   -c/w Cefepime 2g IV q12h for PNA/UTI  -s/p IVF  -follow up BCx, UCx shows E. coli pending sensitivities  -MRSA recent negative   -f/u atypical PNA labs   -supplemental oxygen to maintain PO2 88-92%; wean to home 2LNC if tolerated   -Duonebs q6h PRN  -c/w home inhaler  (symbicort 2 puffs BID)  -Methylpred 40mg q12 -->qd;  -avoid nephrotoxic meds / renally dose medications / monitor renal fxn daily   -hold PO T2DM home meds  -ISS for glucose >200 / monitor FS TIDAC/HS   -c/w nifedipine  -c/w statin   -c/w memantine   -PT evaluation, OOB      DVT prophylaxis: HSQ  CODE status FULL

## 2024-05-16 DIAGNOSIS — J18.9 PNEUMONIA, UNSPECIFIED ORGANISM: ICD-10-CM

## 2024-05-16 DIAGNOSIS — J96.01 ACUTE RESPIRATORY FAILURE WITH HYPOXIA: ICD-10-CM

## 2024-05-16 DIAGNOSIS — Z51.5 ENCOUNTER FOR PALLIATIVE CARE: ICD-10-CM

## 2024-05-16 DIAGNOSIS — Z71.89 OTHER SPECIFIED COUNSELING: ICD-10-CM

## 2024-05-16 LAB
ANION GAP SERPL CALC-SCNC: 10 MMOL/L — SIGNIFICANT CHANGE UP (ref 7–14)
BUN SERPL-MCNC: 33 MG/DL — HIGH (ref 10–20)
CALCIUM SERPL-MCNC: 9.4 MG/DL — SIGNIFICANT CHANGE UP (ref 8.4–10.5)
CHLORIDE SERPL-SCNC: 106 MMOL/L — SIGNIFICANT CHANGE UP (ref 98–110)
CO2 SERPL-SCNC: 24 MMOL/L — SIGNIFICANT CHANGE UP (ref 17–32)
CREAT SERPL-MCNC: 1.2 MG/DL — SIGNIFICANT CHANGE UP (ref 0.7–1.5)
EGFR: 44 ML/MIN/1.73M2 — LOW
GLUCOSE BLDC GLUCOMTR-MCNC: 137 MG/DL — HIGH (ref 70–99)
GLUCOSE BLDC GLUCOMTR-MCNC: 168 MG/DL — HIGH (ref 70–99)
GLUCOSE BLDC GLUCOMTR-MCNC: 205 MG/DL — HIGH (ref 70–99)
GLUCOSE BLDC GLUCOMTR-MCNC: 215 MG/DL — HIGH (ref 70–99)
GLUCOSE SERPL-MCNC: 189 MG/DL — HIGH (ref 70–99)
HCT VFR BLD CALC: 30.4 % — LOW (ref 37–47)
HGB BLD-MCNC: 9.1 G/DL — LOW (ref 12–16)
MCHC RBC-ENTMCNC: 25.3 PG — LOW (ref 27–31)
MCHC RBC-ENTMCNC: 29.9 G/DL — LOW (ref 32–37)
MCV RBC AUTO: 84.4 FL — SIGNIFICANT CHANGE UP (ref 81–99)
NRBC # BLD: 0 /100 WBCS — SIGNIFICANT CHANGE UP (ref 0–0)
PLATELET # BLD AUTO: 505 K/UL — HIGH (ref 130–400)
PMV BLD: 10.2 FL — SIGNIFICANT CHANGE UP (ref 7.4–10.4)
POTASSIUM SERPL-MCNC: 5.1 MMOL/L — HIGH (ref 3.5–5)
POTASSIUM SERPL-SCNC: 5.1 MMOL/L — HIGH (ref 3.5–5)
RBC # BLD: 3.6 M/UL — LOW (ref 4.2–5.4)
RBC # FLD: 16.5 % — HIGH (ref 11.5–14.5)
SODIUM SERPL-SCNC: 140 MMOL/L — SIGNIFICANT CHANGE UP (ref 135–146)
WBC # BLD: 11.73 K/UL — HIGH (ref 4.8–10.8)
WBC # FLD AUTO: 11.73 K/UL — HIGH (ref 4.8–10.8)

## 2024-05-16 PROCEDURE — 99233 SBSQ HOSP IP/OBS HIGH 50: CPT

## 2024-05-16 PROCEDURE — 99223 1ST HOSP IP/OBS HIGH 75: CPT | Mod: 95

## 2024-05-16 PROCEDURE — 99497 ADVNCD CARE PLAN 30 MIN: CPT | Mod: 25

## 2024-05-16 RX ORDER — ERTAPENEM SODIUM 1 G/1
1000 INJECTION, POWDER, LYOPHILIZED, FOR SOLUTION INTRAMUSCULAR; INTRAVENOUS EVERY 24 HOURS
Refills: 0 | Status: COMPLETED | OUTPATIENT
Start: 2024-05-16 | End: 2024-05-18

## 2024-05-16 RX ORDER — MEROPENEM 1 G/30ML
1000 INJECTION INTRAVENOUS EVERY 12 HOURS
Refills: 0 | Status: DISCONTINUED | OUTPATIENT
Start: 2024-05-16 | End: 2024-05-16

## 2024-05-16 RX ORDER — AMLODIPINE BESYLATE 2.5 MG/1
5 TABLET ORAL ONCE
Refills: 0 | Status: COMPLETED | OUTPATIENT
Start: 2024-05-16 | End: 2024-05-16

## 2024-05-16 RX ORDER — AMLODIPINE BESYLATE 2.5 MG/1
5 TABLET ORAL DAILY
Refills: 0 | Status: DISCONTINUED | OUTPATIENT
Start: 2024-05-16 | End: 2024-05-17

## 2024-05-16 RX ADMIN — Medication 30 MILLIGRAM(S): at 12:24

## 2024-05-16 RX ADMIN — AMLODIPINE BESYLATE 5 MILLIGRAM(S): 2.5 TABLET ORAL at 06:01

## 2024-05-16 RX ADMIN — Medication 2: at 08:07

## 2024-05-16 RX ADMIN — HEPARIN SODIUM 5000 UNIT(S): 5000 INJECTION INTRAVENOUS; SUBCUTANEOUS at 21:10

## 2024-05-16 RX ADMIN — GABAPENTIN 100 MILLIGRAM(S): 400 CAPSULE ORAL at 21:10

## 2024-05-16 RX ADMIN — ATORVASTATIN CALCIUM 40 MILLIGRAM(S): 80 TABLET, FILM COATED ORAL at 21:10

## 2024-05-16 RX ADMIN — HEPARIN SODIUM 5000 UNIT(S): 5000 INJECTION INTRAVENOUS; SUBCUTANEOUS at 05:30

## 2024-05-16 RX ADMIN — MEMANTINE HYDROCHLORIDE 10 MILLIGRAM(S): 10 TABLET ORAL at 17:23

## 2024-05-16 RX ADMIN — Medication 1 PATCH: at 21:09

## 2024-05-16 RX ADMIN — ERTAPENEM SODIUM 120 MILLIGRAM(S): 1 INJECTION, POWDER, LYOPHILIZED, FOR SOLUTION INTRAMUSCULAR; INTRAVENOUS at 17:23

## 2024-05-16 RX ADMIN — HEPARIN SODIUM 5000 UNIT(S): 5000 INJECTION INTRAVENOUS; SUBCUTANEOUS at 15:34

## 2024-05-16 RX ADMIN — Medication 40 MILLIGRAM(S): at 17:23

## 2024-05-16 RX ADMIN — Medication 4: at 16:47

## 2024-05-16 RX ADMIN — MEMANTINE HYDROCHLORIDE 10 MILLIGRAM(S): 10 TABLET ORAL at 05:30

## 2024-05-16 NOTE — CDI QUERY NOTE - NSCDIOTHERTXTBX2_GEN_ALL_CORE_FT
Clinical documentation and/or evidence in the medical record indicates that this patient has sepsis.  In order to ensure accurate coding and accuracy of the clinical record, the documentation in this patient’s medical record requires additional clarification.      Please include more specific documentation as to the type/status of sepsis in your progress note and/or discharge summary.   Please clarify if the patient was found to have:    •	Sepsis ruled in   •	Sepsis ruled out after study    •	Other (specify)    Supporting documentation and/or clinical evidence:   Flow Sheet: v/s:  2024:    T=  102.6,  HR=  116, RR=19,  93% on 15L/min nonrebreather mask  Labs: WBC= 18.52,    Bands=  12.0  ,  Platelet=  529,  Procalcitonin = 7.90,  Cr= 1.5  CXR:  2024: Impression: Left lower lobe infiltrate.  Documentation:   2024:  H/P:  Attending:  HPI: patient has been having shortness of breath and a fever since last night Tmax  100.5,   Home med: Zithromax   Assessment:  Acute respiratory failure    Sepsis    Pneumonia    2024:  Progress Note:  Attendin year-old female with --- COPD on 2 L  oxygen, and hypertension is here for shortness of breath and a fever. Admitted to Medicine for management of acute respiratory failure.  Problem List:   #Acute on chronic hypoxic respiratory failure 2/2 possible PNA  #ESBL E. coli UTI  #COPD exacerbation (home 2LNC)      Meds:  Cefepime   (2024- 2024),   Ertapenem  (2024 - 2024)    Thank you. Clinical documentation and/or evidence in the medical record indicates that this patient has pneumonia.  In order to ensure accurate coding and accuracy of the clinical record, the documentation in this patient’s medical record requires additional clarification.      Please include more specific documentation as to the type of pneumonia in your progress note and/or discharge summary.    Please clarify if the patient was found to have one of the following:    •	Aspiration pneumonia  •	Gram-negative pneumonia (link to organism if known)   •	Other (specify)    Supporting documentation and/or clinical evidence:   CXR:  5/13/2024: Impression: Left lower lobe infiltrate.    Documentation:   5/13/2024: IMPRESSION:      #Acute on chronic hypoxic respiratory failure.  #Concern for gram negative pneumonia      Meds:  Cefepime   (5/13/2024- 5/16/2024),   Ertapenem  (5/16/2024 - 5/19/2024)          Thank you. Clinical documentation and/or evidence in the medical record indicates that this patient has pneumonia.  In order to ensure accurate coding and accuracy of the clinical record, the documentation in this patient’s medical record requires additional clarification.      Please include more specific documentation as to the type of pneumonia in your progress note and/or discharge summary.    Please clarify if the patient was found to have one of the following:    •	Aspiration pneumonia  •	Gram-negative pneumonia (link to organism if known)   •	Other (specify)    Supporting documentation and/or clinical evidence:   CXR:  5/13/2024: Impression: Left lower lobe infiltrate.    Documentation:   5/13/2024: Chart Note:   ID Consult:   IMPRESSION:      #Acute on chronic hypoxic respiratory failure.  #Concern for gram negative pneumonia      Meds:  Cefepime   (5/13/2024- 5/16/2024),   Ertapenem  (5/16/2024 - 5/19/2024)          Thank you.

## 2024-05-16 NOTE — CONSULT NOTE ADULT - ASSESSMENT
83F with PMH of dementia (AAO x 1), COPD on 2L O2, HTN, HLD, here with dyspnea and fever, and found to have acute respiratory failure from PNA, as well as COPD exacerbation. Started on IV abx, nebs, and steroids. Full code; palliative consulted for GOC.
ASSESSMENT  This is a 83 year-old female with PMH of dementia, hyperlipidemia, diabetes, COPD on 2 L  oxygen, and hypertension is here for shortness of breath and a fever.    IMPRESSION  #Acute on chronic hypoxic respiratory failure.  #Concern for gram negative pneumonia  #COPD on home oxygen. Possible exacerbation  #Pyuria- Urine cultures pending. Doubt UTI.   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  #Recent MRSA nares negative.     RECOMMENDATIONS  -Recently managed for COPD exacerbation and possible CAP (Ceftriaxone/Azith)  -Follow up with blood cultures. Follow up with urine cultures. Check Sputum cultures.  -Continue with cefepime 2 gram q 12 hours for now.   -Steroids as per the primary team.  -Off loading to prevent pressure sores and preventive measures to avoid aspiration and delirium precautions.     If any questions, please send a message or call on [employer] Teams  Please continue to update ID with any pertinent new laboratory or radiographic findings.    Diony Clemente M.D  Infectious Diseases Attending/   [employer] at [facility]

## 2024-05-16 NOTE — PROGRESS NOTE ADULT - SUBJECTIVE AND OBJECTIVE BOX
SABRINA DOMINGUEZ  83y, Female    LOS  3d    INTERVAL EVENTS/HPI  - T(F): , Max: 98 (05-15-24 @ 21:15)  - Denies any worsening symptoms  - Tolerating medication  - WBC Count: 11.73 (05-16-24 @ 07:00)  WBC Count: 16.56 (05-14-24 @ 06:25)  - Creatinine: 1.2 (05-16-24 @ 07:00)    REVIEW OF SYSTEMS:  CONSTITUTIONAL: No fever or chills  HEENT: No sore throat  RESPIRATORY: No cough, no shortness of breath  CARDIOVASCULAR: No chest pain or palpitations  GASTROINTESTINAL: No abdominal or epigastric pain  GENITOURINARY: No dysuria  NEUROLOGICAL: No headache/dizziness  MSK: No joint pain, erythema, or swelling; no back pain  SKIN: No itching, rashes  All other ROS negative except noted above    Prior hospital charts reviewed [Yes]  Primary team notes reviewed [Yes]  Other consultant notes reviewed [Yes]    ANTIMICROBIALS:   ertapenem  IVPB 1000 every 24 hours    MEDICATIONS  (STANDING):  cefepime   IVPB   100 mL/Hr IV Intermittent (05-15-24 @ 23:00)   100 mL/Hr IV Intermittent (05-15-24 @ 12:23)   100 mL/Hr IV Intermittent (05-15-24 @ 00:03)   100 mL/Hr IV Intermittent (05-14-24 @ 12:26)   100 mL/Hr IV Intermittent (05-13-24 @ 23:05)   100 mL/Hr IV Intermittent (05-13-24 @ 12:05)    cefepime   IVPB   100 mL/Hr IV Intermittent (05-13-24 @ 01:28)    levoFLOXacin IVPB   100 mL/Hr IV Intermittent (05-13-24 @ 01:37)      OTHER MEDS: MEDICATIONS  (STANDING):  acetaminophen     Tablet .. 650 every 6 hours PRN  albuterol/ipratropium for Nebulization 3 every 6 hours PRN  aluminum hydroxide/magnesium hydroxide/simethicone Suspension 30 every 4 hours PRN  atorvastatin 40 at bedtime  budesonide 160 MICROgram(s)/formoterol 4.5 MICROgram(s) Inhaler 2 two times a day  dextrose 50% Injectable 12.5 once  dextrose 50% Injectable 25 once  dextrose Oral Gel 15 once PRN  gabapentin 100 at bedtime  glucagon  Injectable 1 once  heparin   Injectable 5000 every 8 hours  insulin lispro (ADMELOG) corrective regimen sliding scale  three times a day before meals  memantine 10 two times a day  methylPREDNISolone sodium succinate Injectable 40 every 24 hours  NIFEdipine XL 60 daily  ondansetron Injectable 4 every 6 hours PRN  PARoxetine 30 daily      Vital Signs Last 24 Hrs  T(F): 97.9 (05-16-24 @ 13:25), Max: 102.6 (05-12-24 @ 23:59)    Vital Signs Last 24 Hrs  HR: 70 (05-16-24 @ 13:25) (65 - 70)  BP: 163/87 (05-16-24 @ 13:25) (132/74 - 181/70)  RR: 18 (05-16-24 @ 05:15)  SpO2: 96% (05-16-24 @ 13:25) (96% - 97%)  Wt(kg): --    EXAM:  GENERAL: On NC.   HEAD: No head lesions  NECK: Supple  CHEST/LUNG: Shallow breath sounds.   HEART: S1 S2  ABDOMEN: Soft, nontender  EXTREMITIES: No clubbing, cyanosis, or petal edema  NERVOUS SYSTEM: Alert and oriented to person  MSK: No joint erythema, swelling or pain  SKIN: No rashes or lesions, no superficial thrombophlebitis    Labs:                        9.1    11.73 )-----------( 505      ( 16 May 2024 07:00 )             30.4     05-16    140  |  106  |  33<H>  ----------------------------<  189<H>  5.1<H>   |  24  |  1.2    Ca    9.4      16 May 2024 07:00        WBC Trend:  WBC Count: 11.73 (05-16-24 @ 07:00)  WBC Count: 16.56 (05-14-24 @ 06:25)  WBC Count: 14.16 (05-13-24 @ 06:54)  WBC Count: 18.52 (05-13-24 @ 00:43)      Creatine Trend:  Creatinine: 1.2 (05-16)  Creatinine: 1.3 (05-14)  Creatinine: 1.4 (05-13)  Creatinine: 1.5 (05-13)      Liver Biochemical Testing Trend:  Alanine Aminotransferase (ALT/SGPT): 14 (05-14)  Alanine Aminotransferase (ALT/SGPT): 13 (05-13)  Alanine Aminotransferase (ALT/SGPT): 35 (05-03)  Alanine Aminotransferase (ALT/SGPT): 26 (05-02)  Alanine Aminotransferase (ALT/SGPT): 21 (05-01)  Aspartate Aminotransferase (AST/SGOT): 14 (05-14-24 @ 06:25)  Aspartate Aminotransferase (AST/SGOT): 13 (05-13-24 @ 00:43)  Aspartate Aminotransferase (AST/SGOT): 33 (05-03-24 @ 07:13)  Aspartate Aminotransferase (AST/SGOT): 34 (05-02-24 @ 06:12)  Aspartate Aminotransferase (AST/SGOT): 34 (05-01-24 @ 10:09)  Bilirubin Total: 0.2 (05-14)  Bilirubin Total: 0.3 (05-13)  Bilirubin Total: <0.2 (05-03)  Bilirubin Total: <0.2 (05-02)  Bilirubin Total: 0.3 (05-01)      Trend LDH      Urinalysis Basic - ( 16 May 2024 07:00 )    Color: x / Appearance: x / SG: x / pH: x  Gluc: 189 mg/dL / Ketone: x  / Bili: x / Urobili: x   Blood: x / Protein: x / Nitrite: x   Leuk Esterase: x / RBC: x / WBC x   Sq Epi: x / Non Sq Epi: x / Bacteria: x    MICROBIOLOGY:    Female    Urinalysis with Rflx Culture (collected 13 May 2024 06:23)    Culture - Urine (collected 13 May 2024 06:23)  Source: Clean Catch None  Final Report:    >100,000 CFU/ml Escherichia coli ESBL  Organism: Escherichia coli ESBL  Organism: Escherichia coli ESBL    Sensitivities:      Method Type: LEAH      -  Amoxicillin/Clavulanic Acid: I 16/8      -  Ampicillin: R >16 These ampicillin results predict results for amoxicillin      -  Ampicillin/Sulbactam: R >16/8      -  Aztreonam: R >16      -  Cefazolin: R >16 For uncomplicated UTI with K. pneumoniae, E. coli, or P. mirablis: LEAH <=16 is sensitive and LEAH >=32 is resistant. This also predicts results for oral agents cefaclor, cefdinir, cefpodoxime, cefprozil, cefuroxime axetil, cephalexin and locarbef for uncomplicated UTI. Note that some isolates may be susceptible to these agents while testing resistant to cefazolin.      -  Cefepime: R >16      -  Ceftriaxone: R >32      -  Cefuroxime: R >16      -  Ciprofloxacin: R >2      -  Ertapenem: S <=0.5      -  Gentamicin: S <=2      -  Imipenem: S <=1      -  Levofloxacin: R >4      -  Meropenem: S <=1      -  Nitrofurantoin: S <=32 Should not be used to treat pyelonephritis      -  Piperacillin/Tazobactam: S <=8      -  Tobramycin: R >8      -  Trimethoprim/Sulfamethoxazole: R >2/38    Culture - Blood (collected 13 May 2024 00:43)  Source: .Blood Blood  Preliminary Report:    No growth at 48 Hours    Culture - Blood (collected 13 May 2024 00:43)  Source: .Blood Blood  Preliminary Report:    No growth at 48 Hours    Urinalysis with Rflx Culture (collected 01 May 2024 10:40)    Culture - Urine (collected 01 May 2024 10:40)  Source: Catheterized None  Final Report:    >=3 organisms. Probable collection contamination.    Culture - Blood (collected 01 May 2024 10:09)  Source: .Blood Blood-Peripheral  Final Report:    No growth at 5 days    Culture - Blood (collected 01 May 2024 10:09)  Source: .Blood Blood-Peripheral  Final Report:    No growth at 5 days    Culture - Urine (collected 19 May 2023 16:31)  Source: Clean Catch Clean Catch (Midstream)  Final Report:    No growth      Legionella Antigen, Urine: Negative (05-13 @ 06:23)    Procalcitonin: 7.90 (05-13)        Troponin T, High Sensitivity Result: 26 (05-15)  Troponin T, High Sensitivity Result: 37 (05-13)          RADIOLOGY & ADDITIONAL TESTS:  I have personally reviewed the relevant images.   CXR      CT    < from: Xray Chest 1 View-PORTABLE IMMEDIATE (05.13.24 @ 01:33) >    INTERPRETATION:  Clinical History / Reason for exam: Shortness of breath    Comparison : Chest radiograph May 1, 2024.    Technique/Positioning: AP chest.    Findings:    Support devices: None.    Cardiac/mediastinum/hilum: Unremarkable.    Lung parenchyma/Pleura: Left lower lobe infiltrate.    Skeleton/soft tissues: Degenerative changes.    Impression:    Left lower lobe infiltrate.      < end of copied text >      WEIGHT  Weight (kg): 79.1 (05-13-24 @ 14:25)  Creatinine: 1.2 mg/dL (05-16-24 @ 07:00)      All available historical records have been reviewed

## 2024-05-16 NOTE — PROGRESS NOTE ADULT - SUBJECTIVE AND OBJECTIVE BOX
Progress note    INTERVAL HPI/OVERNIGHT EVENTS:    Patient seen and examined at bedside. She does not provide much history.      REVIEW OF SYSTEMS:  All other 13 Review of systems were reviewed and are negative    FAMILY HISTORY:  No pertinent family history in first degree relatives      T(C): 36.5 (05-16-24 @ 05:15), Max: 36.7 (05-15-24 @ 21:15)  HR: 65 (05-16-24 @ 05:15) (65 - 70)  BP: 170/82 (05-16-24 @ 06:42) (132/74 - 181/70)  RR: 18 (05-16-24 @ 05:15) (17 - 18)  SpO2: 97% (05-16-24 @ 05:15) (96% - 97%)  Wt(kg): --Vital Signs Last 24 Hrs  T(C): 36.5 (16 May 2024 05:15), Max: 36.7 (15 May 2024 21:15)  T(F): 97.7 (16 May 2024 05:15), Max: 98 (15 May 2024 21:15)  HR: 65 (16 May 2024 05:15) (65 - 70)  BP: 170/82 (16 May 2024 06:42) (132/74 - 181/70)  BP(mean): --  RR: 18 (16 May 2024 05:15) (17 - 18)  SpO2: 97% (16 May 2024 05:15) (96% - 97%)    Parameters below as of 16 May 2024 05:15  Patient On (Oxygen Delivery Method): nasal cannula  O2 Flow (L/min): 2    No Known Allergies      PHYSICAL EXAM:    GENERAL:  84y/o Female NAD, resting comfortably.  HEAD:  Atraumatic, Normocephalic  EYES: EOMI, PERRLA, conjunctiva and sclera clear  NECK: Supple, No JVD, no cervical lymphadenopathy, non-tender  CHEST/LUNG: shallow breathing  bilaterally; + wheeze,+ rhonchi, no  rales  HEART: Regular rate and rhythm; S1&S2  ABDOMEN: Soft, Nontender, Nondistended x 4 quadrants; Bowel sounds present  EXTREMITIES:   Peripheral Pulses Present, No clubbing, no cyanosis, or no edema, no calf tenderness  PSYCH:  demented  NEUROLOGY: at base line    Consultant(s) Notes Reviewed:  [x ] YES  [ ] NO  Care Discussed with Consultants/Other Providers [ x] YES  [ ] NO    LABS:      RADIOLOGY & ADDITIONAL TESTS:    Imaging Personally Reviewed:  [ ] YES  [ ] NO  acetaminophen     Tablet .. 650 milliGRAM(s) Oral every 6 hours PRN  albuterol/ipratropium for Nebulization 3 milliLiter(s) Nebulizer every 6 hours PRN  aluminum hydroxide/magnesium hydroxide/simethicone Suspension 30 milliLiter(s) Oral every 4 hours PRN  atorvastatin 40 milliGRAM(s) Oral at bedtime  budesonide 160 MICROgram(s)/formoterol 4.5 MICROgram(s) Inhaler 2 Puff(s) Inhalation two times a day  dextrose 10% Bolus 125 milliLiter(s) IV Bolus once  dextrose 5%. 1000 milliLiter(s) IV Continuous <Continuous>  dextrose 5%. 1000 milliLiter(s) IV Continuous <Continuous>  dextrose 50% Injectable 12.5 Gram(s) IV Push once  dextrose 50% Injectable 25 Gram(s) IV Push once  dextrose Oral Gel 15 Gram(s) Oral once PRN  gabapentin 100 milliGRAM(s) Oral at bedtime  glucagon  Injectable 1 milliGRAM(s) IntraMuscular once  heparin   Injectable 5000 Unit(s) SubCutaneous every 8 hours  insulin lispro (ADMELOG) corrective regimen sliding scale   SubCutaneous three times a day before meals  memantine 10 milliGRAM(s) Oral two times a day  meropenem  IVPB 1000 milliGRAM(s) IV Intermittent every 12 hours  methylPREDNISolone sodium succinate Injectable 40 milliGRAM(s) IV Push every 24 hours  NIFEdipine XL 60 milliGRAM(s) Oral daily  ondansetron Injectable 4 milliGRAM(s) IV Push every 6 hours PRN  PARoxetine 30 milliGRAM(s) Oral daily      HEALTH ISSUES - PROBLEM Dx:    This is a 83 year-old female with PMH of dementia, hyperlipidemia, diabetes, COPD on 2 L  oxygen, and hypertension is here for shortness of breath and a fever. Admitted to Medicine for management of acute respiratory failure.    Problem List:     #Acute on chronic hypoxic respiratory failure 2/2 possible PNA  #ESBL E. coli UTI  #COPD exacerbation (home 2LNC)    #HTN/HLD  #T2DM  #KORI on CKD 3   #AOCKD   #Obesity (BMI 33)  #Dementia    Plan:  2 L bolus given in the ED  CXR: Left lower lobe infiltrate   cr 1.5 , baseline 1.2  ID recs appreciated   -s/p IVF  -follow up BCx, UCx shows ESBL E. coli   -Dc cefepime, start meropenem  -MRSA recent negative   -f/u atypical PNA labs   -supplemental oxygen to maintain PO2 88-92%; wean to home 2LNC if tolerated   -Duonebs q6h PRN  -c/w home inhaler  (symbicort 2 puffs BID)  -Methylpred 40mg q12 -->qd;  -avoid nephrotoxic meds / renally dose medications / monitor renal fxn daily   -hold PO T2DM home meds  -ISS for glucose >200 / monitor FS TIDAC/HS   -c/w nifedipine  -c/w statin   -c/w memantine   -PT evaluation, OOB    DVT prophylaxis: HSQ  CODE status FULL     Progress note    INTERVAL HPI/OVERNIGHT EVENTS:    Patient seen and examined at bedside. She does not provide much history.      REVIEW OF SYSTEMS:  All other 13 Review of systems were reviewed and are negative    FAMILY HISTORY:  No pertinent family history in first degree relatives      T(C): 36.5 (05-16-24 @ 05:15), Max: 36.7 (05-15-24 @ 21:15)  HR: 65 (05-16-24 @ 05:15) (65 - 70)  BP: 170/82 (05-16-24 @ 06:42) (132/74 - 181/70)  RR: 18 (05-16-24 @ 05:15) (17 - 18)  SpO2: 97% (05-16-24 @ 05:15) (96% - 97%)  Wt(kg): --Vital Signs Last 24 Hrs  T(C): 36.5 (16 May 2024 05:15), Max: 36.7 (15 May 2024 21:15)  T(F): 97.7 (16 May 2024 05:15), Max: 98 (15 May 2024 21:15)  HR: 65 (16 May 2024 05:15) (65 - 70)  BP: 170/82 (16 May 2024 06:42) (132/74 - 181/70)  BP(mean): --  RR: 18 (16 May 2024 05:15) (17 - 18)  SpO2: 97% (16 May 2024 05:15) (96% - 97%)    Parameters below as of 16 May 2024 05:15  Patient On (Oxygen Delivery Method): nasal cannula  O2 Flow (L/min): 2    No Known Allergies      PHYSICAL EXAM:    GENERAL:  82y/o Female NAD, resting comfortably.  HEAD:  Atraumatic, Normocephalic  EYES: EOMI, PERRLA, conjunctiva and sclera clear  NECK: Supple, No JVD, no cervical lymphadenopathy, non-tender  CHEST/LUNG: shallow breathing  bilaterally; + wheeze,+ rhonchi, no  rales  HEART: Regular rate and rhythm; S1&S2  ABDOMEN: Soft, Nontender, Nondistended x 4 quadrants; Bowel sounds present  EXTREMITIES:   Peripheral Pulses Present, No clubbing, no cyanosis, or no edema, no calf tenderness  PSYCH:  demented  NEUROLOGY: at base line    Consultant(s) Notes Reviewed:  [x ] YES  [ ] NO  Care Discussed with Consultants/Other Providers [ x] YES  [ ] NO    LABS:      RADIOLOGY & ADDITIONAL TESTS:    Imaging Personally Reviewed:  [ ] YES  [ ] NO  acetaminophen     Tablet .. 650 milliGRAM(s) Oral every 6 hours PRN  albuterol/ipratropium for Nebulization 3 milliLiter(s) Nebulizer every 6 hours PRN  aluminum hydroxide/magnesium hydroxide/simethicone Suspension 30 milliLiter(s) Oral every 4 hours PRN  atorvastatin 40 milliGRAM(s) Oral at bedtime  budesonide 160 MICROgram(s)/formoterol 4.5 MICROgram(s) Inhaler 2 Puff(s) Inhalation two times a day  dextrose 10% Bolus 125 milliLiter(s) IV Bolus once  dextrose 5%. 1000 milliLiter(s) IV Continuous <Continuous>  dextrose 5%. 1000 milliLiter(s) IV Continuous <Continuous>  dextrose 50% Injectable 12.5 Gram(s) IV Push once  dextrose 50% Injectable 25 Gram(s) IV Push once  dextrose Oral Gel 15 Gram(s) Oral once PRN  gabapentin 100 milliGRAM(s) Oral at bedtime  glucagon  Injectable 1 milliGRAM(s) IntraMuscular once  heparin   Injectable 5000 Unit(s) SubCutaneous every 8 hours  insulin lispro (ADMELOG) corrective regimen sliding scale   SubCutaneous three times a day before meals  memantine 10 milliGRAM(s) Oral two times a day  meropenem  IVPB 1000 milliGRAM(s) IV Intermittent every 12 hours  methylPREDNISolone sodium succinate Injectable 40 milliGRAM(s) IV Push every 24 hours  NIFEdipine XL 60 milliGRAM(s) Oral daily  ondansetron Injectable 4 milliGRAM(s) IV Push every 6 hours PRN  PARoxetine 30 milliGRAM(s) Oral daily      HEALTH ISSUES - PROBLEM Dx:    This is a 83 year-old female with PMH of dementia, hyperlipidemia, diabetes, COPD on 2 L  oxygen, and hypertension is here for shortness of breath and a fever. Admitted to Medicine for management of acute respiratory failure.    Problem List:     #Acute on chronic hypoxic respiratory failure 2/2 possible PNA  #ESBL E. coli UTI  #COPD exacerbation (home 2LNC)    #HTN/HLD  #T2DM  #KORI on CKD 3   #AOCKD   #Obesity (BMI 33)  #Dementia    Plan:  2 L bolus given in the ED  CXR: Left lower lobe infiltrate   cr 1.5 , baseline 1.2  ID recs appreciated   -s/p IVF  -follow up BCx, UCx shows ESBL E. coli   -Dc cefepime, start meropenem  -MRSA recent negative   -f/u atypical PNA labs   -supplemental oxygen to maintain PO2 88-92%; wean to home 2LNC if tolerated   -Duonebs q6h PRN  -c/w home inhaler  (symbicort 2 puffs BID)  -Methylpred 40mg q12 -->qd;  -avoid nephrotoxic meds / renally dose medications / monitor renal fxn daily   -hold PO T2DM home meds  -ISS for glucose >200 / monitor FS TIDAC/HS   -Pt unable to swallow nifedipine, will try start clonidine patch temporarily for now. Will give amlodipine in am. If bp not controlled by tomorrow, will add ACE/ARB  -c/w statin   -c/w memantine   -PT evaluation, OOB    DVT prophylaxis: HSQ  CODE status FULL

## 2024-05-16 NOTE — CONSULT NOTE ADULT - PROBLEM SELECTOR RECOMMENDATION 9
No
- concern for developing sepsis given leukocytosis and PNA, c/w IV abx, monitor hemodynamics and vital signs closely

## 2024-05-16 NOTE — SWALLOW BEDSIDE ASSESSMENT ADULT - SLP PERTINENT HISTORY OF CURRENT PROBLEM
83 year-old female came accompanied by her daughter on admission. As per daughter  pt had  shortness of breath. Pt poor historian due to dementia,   with a past medical history of hyperlipidemia, diabetes, dementia usual a&ox1, COPD on 2 L  oxygen, and hypertension Per daughter, patient has been having shortness of breath and a fever since last nigh Tmax  100.5, Daughter denies cough or wheezing.  NO coughing with eating. Decreased PO intake for past few days
83 year-old female came accompanied by her daughter on admission. As per daughter  pt had  shortness of breath. Pt poor historian due to dementia,   with a past medical history of hyperlipidemia, diabetes, dementia usual a&ox1, COPD on 2 L  oxygen, and hypertension Per daughter, patient has been having shortness of breath and a fever since last nigh Tmax  100.5, Daughter denies cough or wheezing.  NO coughing with eating. Decreased PO intake for past few days

## 2024-05-16 NOTE — PROGRESS NOTE ADULT - PARTICIPANTS
History    Eder Evangelista is a 75 year old male who presents for a preoperative examination, we have been asked by Dr. Skaggs to medically clear this patient for a left eye cataract surgery, the surgery scheduled for March 1, 2017. Patient has history of coronary artery disease status post CABG in 1998,and cardiomyopathy, status post ICD placement, has been doing fairly well, no chest pain or shortness of breath has been reported by the patient. Patient had a nuclear stress test done in 2011, at that time ejection fraction was low at around 20%. No reversible stress-induced ischemia was noted at that time.    Issues function status has been good. He has remained fairly physically active, as mentioned above no shortness of breath are or chest pain has been reported by the patient.       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   • Essential (primary) hypertension    • Fitting and adjustment of urinary device      Self cath twice a day at home   • Localized superficial swelling, mass, or lump 3/15/2013     renal mass   • DARNELL (obstructive sleep apnea)      Wears CPAP at night   • Other and unspecified hyperlipidemia      Dyslipidemia   • Retention of urine      Self cath twice a day at home   • Retinal detachment, old, partial 11/9/2011   • 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     Right atrial mass   • Wears glasses      Patient Active Problem List   Diagnosis   • 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   • Cardiomyopathy   • ICD (implantable cardiac defibrillator) in place   • Retinal detachment, old, partial   • Right atrial thrombus   • Other specified pre-operative examination   • Localized superficial swelling, mass, or lump   • Atrial mass s/p  resection   • Hypersomnia with sleep apnea, unspecified   • Exotropia of left eye   • DARNELL (obstructive sleep apnea)   • Hx of nonmelanoma skin cancer   • Family history of malignant melanoma   • History of unilateral nephrectomy   • Cataract       SURGICAL history    Past Surgical History   Procedure Laterality Date   • Icd implant  11/08/2011   • Coronary artery bypass graft  1998   • Coronary angiogram - cv  3/18/13   • Transurethral resection of prostate  ~2010   • Eye surgery       Detached Retina   • Retinal detachment surgery     • Kidney surgery         social history    Social History     Social History   • Marital status:      Spouse name: N/A   • Number of children: N/A   • Years of education: N/A     Social History Main Topics   • Smoking status: Former Smoker     Packs/day: 2.00     Years: 50.00     Types: Cigarettes     Quit date: 4/27/2011   • Smokeless tobacco: Never Used   • Alcohol use 0.0 oz/week     0 Standard drinks or equivalent per week      Comment: social   • Drug use: No   • Sexual activity: Not Asked     Other Topics Concern   • None     Social History Narrative       family history    Family History   Problem Relation Age of Onset   • Hypertension Brother    • Heart disease Daughter    • Cancer Son      Melanoma   • Cancer Maternal Grandmother    • Cancer Paternal Grandmother    • Cataracts Mother        mEDICATIONS    Current Outpatient Prescriptions   Medication Sig   • lisinopril (ZESTRIL) 10 MG tablet Take 2.5 tablets by mouth daily. Dose change on 2-13-17   • ketorolac (ACULAR) 0.5 % ophthalmic solution Place 1 drop to operative eye four times daily starting on 02/27/17. After surgery, use as directed for the next 4 weeks.   • prednisoLONE acetate (PRED FORTE, OMNIPRED) 1 % ophthalmic suspension Place 1 drop in operative eye four times daily for 1 week after surgery and taper as directed   • gentamicin (GARAMYCIN) 0.3 % ophthalmic solution Place 1 drop into left eye 4 times  daily. Starting 02/27/17   • cetirizine (ZYRTEC) 10 MG tablet Take 10 mg by mouth daily.   • sildenafil (VIAGRA) 100 MG tablet Take 100 mg by mouth as needed for Erectile Dysfunction.   • amoxicillin (AMOXIL) 500 MG capsule Take 500 mg by mouth as needed. Take 4 capsules 1 hour prior to dental procedures   • omeprazole (PRILOSEC) 20 MG capsule Take 20 mg by mouth daily.   • fluticasone (FLONASE) 50 MCG/ACT nasal spray Spray 2 sprays in each nostril nightly.   • Chlorhexidine Gluconate 4 % Solution Apply 1 mL topically 5 times daily.   • Lubricants (SURGICAL LUBRICANT) gel Apply 1 application topically as needed.   • EPINEPHrine (EPIPEN 2-CHRIS IJ) Inject 1 each as directed as needed (as needed for insect bite).   • Artificial Tear Solution (SOOTHE XP OP) Apply 1 drop to eye 3 times daily as needed.   • DISPENSE Coloplast [place] tip coude w/guide stripe male  16 in. 14 fr. 40 cm.   • DISPENSE Relamed Polished eyelet intermittent catheter 16 in.  Male  14 Fr..   • aspirin 81 MG tablet Take 81 mg by mouth daily. Take 2 81mg tablets daily   • metoPROLOL (TOPROL-XL) 50 MG 24 hr tablet Take 75 mg by mouth daily. Take 1 and 1/2 tabs to = 75mg   • fish oil-omega-3 fatty acids 1000 MG CAPS Take 1,000 mg by mouth 2 times daily.   • atorvastatin (LIPITOR) 80 MG tablet Take 80 mg by mouth nightly.   • Multiple Vitamins-Minerals (MULTIVITAMIN & MINERAL PO) Take 1 tablet by mouth daily.     No current facility-administered medications for this visit.        aLLERGIES    ALLERGIES:   Allergen Reactions   • Mevacor [Lovastatin] Other (See Comments)     Jaundice   • Bee SWELLING   • Cephalexin    • Ciprofloxacin Other (See Comments)   • Mevacor [Altoprev] Other (See Comments)   • Simvastatin Other (See Comments)       Review of systems        Constitutional:  Denies fevers, chills, weakness, fatigue, loss of appetite,    Eyes: Does have blurry vision in both eyes.    HENT:  Denies facial pain, ear pain, hearing loss, tinnitus, nasal  congestion, rhinorrhea, Respiratory:  Denies SOB and cough.  Cardiovascular:  Denies chest pains, vertigo, dizziness and falls.    Gastrointestinal:  Denies abdominal pain.  Musculoskeletal:  Denies back pain.  No pain in lower legs.   Neurologic:  Denies headaches, dizziness.   Genitourinary:  No difficulty with urination. No hematuria.   Hematologic/Lymph:  Denies easy bruising or bleeding, swollen lymph glands.    Endocrine:  Denies heat or cold intolerance, polydipsia or polyuria.  Denies changes in hair or skin texture.    Integument:  No rashes, itching or masses.   Psychiatric:  Denies anxiety, depression, hallucinations, irritability or sleeping problems.   Allergic/Immunologic:  Denies recurrent infections, hypersensitivity.      All other Review of Systems negative.    Physical Exam      Vital Signs:    Vitals:    02/17/17 1403   BP: 130/76   Pulse: 60   Resp: 20   Weight: 97.8 kg   Height: 5' 6.5\" (1.689 m)     General:  Well developed, well nourished. In no apparent distress.    Eyes:  No conjunctivitis.    HENT:  Head is atraumatic.  Nose is clear.  Throat is clear.    Neck:  Supple.  No thyromegaly.  No JVD.  No bruit.  Respiratory:  Lungs clear to auscultation bilaterally. No crackles.  No wheezes.    Cardiovascular:  Normal S1 and S2.Regular rate and rhythm.  No edema.  Gastrointestinal:  Soft. Normal bowel sounds.  No hepatosplenomegaly.      Musculoskeletal:  No joint swelling or tenderness in upper and lower extremities with good peripheral DP pulses.  Neurologic:  Alert, oriented x 3,awake and in no acute distress.  Normal sensory function.    Integumentary:  Warm. Dry. Pink. No rashes or lesions. No wounds.    Lymphatic:  No lymphadenopathy in submental,  lymphadenopathy.   Psychiatric: Cooperative. Appropriate mood and affect. Normal judgment.       Assessment & Plan        Patient has history of cardiomyopathy, his functional status has been good. There is no evidence of cardiac  decompensation. Patient is scheduled for a left cataract surgery. No labs are indicated at this point. There has been no chest pain or shortness of breath as mentioned above. I have advised him to take his lisinopril and his metoprolol the morning of the procedure with sips of water. Patient is medically cleared for above-mentioned procedure. Please feel free to contact me if you have any questions.     Family

## 2024-05-16 NOTE — CONSULT NOTE ADULT - PROBLEM SELECTOR RECOMMENDATION 3
- medicine GOC note reviewed, patient was not able to discuss during our evaluation, but was able to endorse wishes for full code with primary team  - will continue to discuss GOC

## 2024-05-16 NOTE — CONSULT NOTE ADULT - PROBLEM SELECTOR RECOMMENDATION 4
- will follow  ______________  Eddie Whaley MD  Palliative Medicine  [facility]   [facility]  [phone]

## 2024-05-16 NOTE — SWALLOW BEDSIDE ASSESSMENT ADULT - SWALLOW EVAL: RECOMMENDED FEEDING/EATING TECHNIQUES
alternate food with liquid/check mouth frequently for oral residue/pocketing/crush medication (when feasible)/hard swallow w/ each bite or sip/maintain upright posture during/after eating for 30 mins/oral hygiene/position upright (90 degrees)/small sips/bites
alternate food with liquid/check mouth frequently for oral residue/pocketing/crush medication (when feasible)/hard swallow w/ each bite or sip/maintain upright posture during/after eating for 30 mins/oral hygiene/position upright (90 degrees)/small sips/bites

## 2024-05-16 NOTE — CONSULT NOTE ADULT - SUBJECTIVE AND OBJECTIVE BOX
HPI: 83F with PMH of dementia (AAO x 1), COPD on 2L O2, HTN, HLD, here with dyspnea and fever, and found to have acute respiratory failure from PNA, as well as COPD exacerbation. Started on IV abx, nebs, and steroids. Full code; palliative consulted for GOC.    PERTINENT PM/SXH:   COPD (chronic obstructive pulmonary disease)    Diabetes mellitus, type 2    High blood cholesterol    Essential hypertension    Dementia    Depression      No significant past surgical history      FAMILY HISTORY:  No pertinent family history in first degree relatives    no pertinent family history      SOCIAL HISTORY:   Significant other/partner[ ]  Children[ ]  Buddhism/Spirituality:  Substance hx:  [ ]   Tobacco hx:  [ ]   Alcohol hx: [ ]   Living Situation: [ ]Home  [ ]Long term care  [ ]Rehab [ ]Other  Home Services: [ ] HHA [ ] Ankit RN [ ] Hospice  Occupation:  Home Opioid hx:  [ ] Y [ ] N [ ] I-Stop Reference No:    ADVANCE DIRECTIVES:    [ ] Full Code [ ] DNR  MOLST  [ ]  Living Will  [ ]   DECISION MAKER(s):  [ ] Health Care Proxy(s)  [ ] Surrogate(s)  [ ] Guardian           Name(s): Phone Number(s):    BASELINE (I)ADL(s) (prior to admission):  [place]: [ ]Total  [ ] Moderate [ ]Dependent  Palliative Performance Status Version 2:         %    http://npcrc.org/files/news/palliative_performance_scale_ppsv2.pdf    Allergies    No Known Allergies    Intolerances    MEDICATIONS  (STANDING):  atorvastatin 40 milliGRAM(s) Oral at bedtime  budesonide 160 MICROgram(s)/formoterol 4.5 MICROgram(s) Inhaler 2 Puff(s) Inhalation two times a day  dextrose 10% Bolus 125 milliLiter(s) IV Bolus once  dextrose 5%. 1000 milliLiter(s) (50 mL/Hr) IV Continuous <Continuous>  dextrose 5%. 1000 milliLiter(s) (100 mL/Hr) IV Continuous <Continuous>  dextrose 50% Injectable 12.5 Gram(s) IV Push once  dextrose 50% Injectable 25 Gram(s) IV Push once  gabapentin 100 milliGRAM(s) Oral at bedtime  glucagon  Injectable 1 milliGRAM(s) IntraMuscular once  heparin   Injectable 5000 Unit(s) SubCutaneous every 8 hours  insulin lispro (ADMELOG) corrective regimen sliding scale   SubCutaneous three times a day before meals  memantine 10 milliGRAM(s) Oral two times a day  meropenem  IVPB 1000 milliGRAM(s) IV Intermittent every 12 hours  methylPREDNISolone sodium succinate Injectable 40 milliGRAM(s) IV Push every 24 hours  NIFEdipine XL 60 milliGRAM(s) Oral daily  PARoxetine 30 milliGRAM(s) Oral daily    MEDICATIONS  (PRN):  acetaminophen     Tablet .. 650 milliGRAM(s) Oral every 6 hours PRN Temp greater or equal to 38C (100.4F), Mild Pain (1 - 3)  albuterol/ipratropium for Nebulization 3 milliLiter(s) Nebulizer every 6 hours PRN Shortness of Breath and/or Wheezing  aluminum hydroxide/magnesium hydroxide/simethicone Suspension 30 milliLiter(s) Oral every 4 hours PRN Dyspepsia  dextrose Oral Gel 15 Gram(s) Oral once PRN Blood Glucose LESS THAN 70 milliGRAM(s)/deciliter  ondansetron Injectable 4 milliGRAM(s) IV Push every 6 hours PRN Nausea and/or Vomiting      PRESENT SYMPTOMS: [ ]Unable to obtain due to poor mentation   Source if other than patient:  [ ]Family   [ ]Team   [ X]All review of systems negative including pain and dyspnea unless noted below    All components of pain assessment below addressed. Blank spaces indicate that the patient did/could not complete the assessment.  Pain: [ ]yes [ ]no  QOL impact -   Location -                    Aggravating factors -  Quality -  Radiation -  Timing-  Severity (0-10 scale):  Minimal acceptable level (0-10 scale):     CPOT:    https://www.[facility].org/getattachment/[email or web address]/Critical-Care-Pain-Observation-Tool-(CPOT)    PAIN AD Score:   http://geriatrictoolkit.[place].[facility]/cog/painad.pdf (press ctrl +  left click to view)    Dyspnea:                           [ ]None[ ]Mild [ ]Moderate [ ]Severe     Respiratory Distress Observation Scale (RDOS):   A score of 0 to 2 signifies little or no respiratory distress, 3 signifies mild distress, scores 4 to 6 indicate moderate distress, and scores greater than 7 signify severe distress  https://www.[facility].ca/sites/default/files/PDFS/511680-lztbrlrczgn-axhnleyd-cutwmzisuyr-uqjot.pdf    Anxiety:                             [ ]None[ ]Mild [ ]Moderate [ ]Severe   Fatigue:                             [ ]None[ ]Mild [ ]Moderate [ ]Severe   Nausea:                             [ ]None[ ]Mild [ ]Moderate [ ]Severe   Loss of appetite:              [ ]None[ ]Mild [ ]Moderate [ ]Severe   Constipation:                    [ ]None[ ]Mild [ ]Moderate [ ]Severe    Other Symptoms:      Palliative Performance Status Version 2:         %    http://[facility].org/files/news/palliative_performance_scale_ppsv2.pdf  PHYSICAL EXAM:  Vital Signs Last 24 Hrs  T(C): 36.5 (16 May 2024 05:15), Max: 36.7 (15 May 2024 21:15)  T(F): 97.7 (16 May 2024 05:15), Max: 98 (15 May 2024 21:15)  HR: 65 (16 May 2024 05:15) (65 - 70)  BP: 170/82 (16 May 2024 06:42) (132/74 - 181/70)  BP(mean): --  RR: 18 (16 May 2024 05:15) (17 - 18)  SpO2: 97% (16 May 2024 05:15) (96% - 97%)    Parameters below as of 16 May 2024 05:15  Patient On (Oxygen Delivery Method): nasal cannula  O2 Flow (L/min): 2   I&O's Summary    15 May 2024 07:01  -  16 May 2024 07:00  --------------------------------------------------------  IN: 0 mL / OUT: 1000 mL / NET: -1000 mL        GENERAL:  [X ] No acute distress [ ]Lethargic  [ ]Unarousable  [ ]Verbal  [ ]Non-Verbal [ ]Cachexia    BEHAVIORAL/PSYCH:  [ ]Alert and Oriented x  [ ] Anxiety [ ] Delirium [ ] Agitation [X ] Calm   EYES: [ ] No scleral icterus [ ] Scleral icterus [ ] Closed  ENMT:  [ ]Dry mouth  [ ]No external oral lesions [ X] No external ear or nose lesions  CARDIOVASCULAR:  [ ]Regular [ ]Irregular [ ]Tachy [ ]Not Tachy  [ ]Abdirahman [ ] Edema [ ] No edema  PULMONARY:  [ ]Tachypnea  [ ]Audible excessive secretions [X ] No labored breathing [ ] labored breathing  GASTROINTESTINAL: [ ]Soft  [ ]Distended  [ X]Not distended [ ]Non tender [ ]Tender  MUSCULOSKELETAL: [ ]No clubbing [ ] clubbing  [ X] No cyanosis [ ] cyanosis  NEUROLOGIC: [ ]No focal deficits  [ ]Follows commands  [ ]Does not follow commands  [ ]Cognitive impairment  [ ]Dysphagia  [ ]Dysarthria  [ ]Paresis   SKIN: [ ] Jaundiced [X ] Non-jaundiced [ ]Rash [ ]No Rash [ ] Warm [ ] Dry  MISC/LINES: [ ] ET tube [ ] Trach [ ]NGT/OGT [ ]PEG [ ]Shah    CRITICAL CARE:  [ ] Shock Present  [ ]Septic [ ]Cardiogenic [ ]Neurologic [ ]Hypovolemic  [ ]  Vasopressors [ ]  Inotropes   [ ]Respiratory failure present [ ]Mechanical ventilation [ ]Non-invasive ventilatory support [ ]High flow  [ ]Acute  [ ]Chronic [ ]Hypoxic  [ ]Hypercarbic [ ]Other  [ ]Other organ failure     LABS: reviewed by me, notable for: leukocytosis                        9.1    11.73 )-----------( 505      ( 16 May 2024 07:00 )             30.4   05-16    140  |  106  |  33<H>  ----------------------------<  189<H>  5.1<H>   |  24  |  1.2    Ca    9.4      16 May 2024 07:00        Urinalysis Basic - ( 16 May 2024 07:00 )    Color: x / Appearance: x / SG: x / pH: x  Gluc: 189 mg/dL / Ketone: x  / Bili: x / Urobili: x   Blood: x / Protein: x / Nitrite: x   Leuk Esterase: x / RBC: x / WBC x   Sq Epi: x / Non Sq Epi: x / Bacteria: x      RADIOLOGY & ADDITIONAL STUDIES: CXR personally reviewed by me: left opacity noted    PROTEIN CALORIE MALNUTRITION PRESENT: [ ]mild [ ]moderate [ ]severe [ ]underweight [ ]morbid obesity  https://www.andeal.org/vault/7217/web/files/ONC/Table_Clinical%20Characteristics%20to%20Document%20Malnutrition-White%20JV%20et%20al%202012.pdf    Height (cm): 154.9 (05-13-24 @ 14:25), 157.5 (05-01-24 @ 21:07)  Weight (kg): 79.1 (05-13-24 @ 14:25), 76 (05-01-24 @ 21:07), 77.1 (05-25-23 @ 12:26)  BMI (kg/m2): 33 (05-13-24 @ 14:25), 30.6 (05-01-24 @ 21:07)    [ ]PPSV2 < or = to 30% [ ]significant weight loss  [ ]poor nutritional intake  [ ]anasarca      [ ]Artificial Nutrition          Palliative Care Spiritual/Emotional Screening Tool Question  Severity (0-4):                    OR                    [X ] Unable to determine/NA  Score of 2 or greater indicates recommendation of Chaplaincy referral  Chaplaincy Referral: [ ] Yes [ ] Refused [ ] Following     Caregiver [place]:  [ ] Yes [ ] No    OR    [x ] Unable to determine. Will assess at later time if appropriate.  Social Work Referral [ ]  Patient and Family Centered Care Referral [ ]    Anticipatory Grief Present: [ ] Yes [ ] No    OR     [ x] Unable to determine. Will assess at later time if appropriate.  Social Work Referral [ ]  Patient and Family Centered Care Referral [ ]    REFERRALS:   [ ]Chaplaincy  [ ]Hospice  [ ]Child Life  [ ]Social Work  [ ]Case management [ ]Holistic Therapy     Palliative care education provided to patient and/or family    Goals of Care Document:     ______________  Eddie Whaley MD  Palliative Medicine  [facility]   of Geriatric and Palliative Medicine  [phone]   HPI: 83F with PMH of dementia (AAO x 1), COPD on 2L O2, HTN, HLD, here with dyspnea and fever, and found to have acute respiratory failure from PNA, as well as COPD exacerbation. Started on IV abx, nebs, and steroids. Full code; palliative consulted for GOC.    PERTINENT PM/SXH:   COPD (chronic obstructive pulmonary disease)    Diabetes mellitus, type 2    High blood cholesterol    Essential hypertension    Dementia    Depression      No significant past surgical history      FAMILY HISTORY:  No pertinent family history in first degree relatives    no pertinent family history      SOCIAL HISTORY:   Significant other/partner[ ]  Children[X ]  Yarsani/Spirituality:  Substance hx:  [ ]   Tobacco hx:  [ ]   Alcohol hx: [ ]   Living Situation: [ ]Home  [ ]Long term care  [ ]Rehab [ ]Other  Home Services: [ ] HHA [ ] Visting RN [ ] Hospice  Occupation:  Home Opioid hx:  [ ] Y [ ] N [ ] I-Stop Reference No:    ADVANCE DIRECTIVES:    [ ] Full Code [ ] DNR  MOLST  [ ]  Living Will  [ ]   DECISION MAKER(s):  [ ] Health Care Proxy(s)  [ ] Surrogate(s)  [ ] Guardian           Name(s): Phone Number(s): angel Chacon  [phone]    BASELINE (I)ADL(s) (prior to admission):  [place]: [ ]Total  [ ] Moderate [ ]Dependent  Palliative Performance Status Version 2:         %    http://npcrc.org/files/news/palliative_performance_scale_ppsv2.pdf    Allergies    No Known Allergies    Intolerances    MEDICATIONS  (STANDING):  atorvastatin 40 milliGRAM(s) Oral at bedtime  budesonide 160 MICROgram(s)/formoterol 4.5 MICROgram(s) Inhaler 2 Puff(s) Inhalation two times a day  dextrose 10% Bolus 125 milliLiter(s) IV Bolus once  dextrose 5%. 1000 milliLiter(s) (50 mL/Hr) IV Continuous <Continuous>  dextrose 5%. 1000 milliLiter(s) (100 mL/Hr) IV Continuous <Continuous>  dextrose 50% Injectable 12.5 Gram(s) IV Push once  dextrose 50% Injectable 25 Gram(s) IV Push once  gabapentin 100 milliGRAM(s) Oral at bedtime  glucagon  Injectable 1 milliGRAM(s) IntraMuscular once  heparin   Injectable 5000 Unit(s) SubCutaneous every 8 hours  insulin lispro (ADMELOG) corrective regimen sliding scale   SubCutaneous three times a day before meals  memantine 10 milliGRAM(s) Oral two times a day  meropenem  IVPB 1000 milliGRAM(s) IV Intermittent every 12 hours  methylPREDNISolone sodium succinate Injectable 40 milliGRAM(s) IV Push every 24 hours  NIFEdipine XL 60 milliGRAM(s) Oral daily  PARoxetine 30 milliGRAM(s) Oral daily    MEDICATIONS  (PRN):  acetaminophen     Tablet .. 650 milliGRAM(s) Oral every 6 hours PRN Temp greater or equal to 38C (100.4F), Mild Pain (1 - 3)  albuterol/ipratropium for Nebulization 3 milliLiter(s) Nebulizer every 6 hours PRN Shortness of Breath and/or Wheezing  aluminum hydroxide/magnesium hydroxide/simethicone Suspension 30 milliLiter(s) Oral every 4 hours PRN Dyspepsia  dextrose Oral Gel 15 Gram(s) Oral once PRN Blood Glucose LESS THAN 70 milliGRAM(s)/deciliter  ondansetron Injectable 4 milliGRAM(s) IV Push every 6 hours PRN Nausea and/or Vomiting      PRESENT SYMPTOMS: [X ]Unable to obtain due to poor mentation   Source if other than patient:  [ ]Family   [ ]Team   [ ]All review of systems negative including pain and dyspnea unless noted below    All components of pain assessment below addressed. Blank spaces indicate that the patient did/could not complete the assessment.  Pain: [ ]yes [ ]no  QOL impact -   Location -                    Aggravating factors -  Quality -  Radiation -  Timing-  Severity (0-10 scale):  Minimal acceptable level (0-10 scale):     CPOT:    https://www.[facility].org/getattachment/[email or web address]/Critical-Care-Pain-Observation-Tool-(CPOT)    PAIN AD Score: 0  http://geriatrictoolkit.[place].[facility]/cog/painad.pdf (press ctrl +  left click to view)    Dyspnea:                           [ ]None[ ]Mild [ ]Moderate [ ]Severe     Respiratory Distress Observation Scale (RDOS): 0  A score of 0 to 2 signifies little or no respiratory distress, 3 signifies mild distress, scores 4 to 6 indicate moderate distress, and scores greater than 7 signify severe distress  https://www.[facility].ca/sites/default/files/PDFS/205596-lfsrehuqwrj-cpxilsvb-ymeekwyclki-nkzsi.pdf    Anxiety:                             [ ]None[ ]Mild [ ]Moderate [ ]Severe   Fatigue:                             [ ]None[ ]Mild [ ]Moderate [ ]Severe   Nausea:                             [ ]None[ ]Mild [ ]Moderate [ ]Severe   Loss of appetite:              [ ]None[ ]Mild [ ]Moderate [ ]Severe   Constipation:                    [ ]None[ ]Mild [ ]Moderate [ ]Severe    Other Symptoms:      Palliative Performance Status Version 2:         %    http://[facility].org/files/news/palliative_performance_scale_ppsv2.pdf  PHYSICAL EXAM:  Vital Signs Last 24 Hrs  T(C): 36.5 (16 May 2024 05:15), Max: 36.7 (15 May 2024 21:15)  T(F): 97.7 (16 May 2024 05:15), Max: 98 (15 May 2024 21:15)  HR: 65 (16 May 2024 05:15) (65 - 70)  BP: 170/82 (16 May 2024 06:42) (132/74 - 181/70)  BP(mean): --  RR: 18 (16 May 2024 05:15) (17 - 18)  SpO2: 97% (16 May 2024 05:15) (96% - 97%)    Parameters below as of 16 May 2024 05:15  Patient On (Oxygen Delivery Method): nasal cannula  O2 Flow (L/min): 2   I&O's Summary    15 May 2024 07:01  -  16 May 2024 07:00  --------------------------------------------------------  IN: 0 mL / OUT: 1000 mL / NET: -1000 mL        GENERAL:  [X ] No acute distress [ ]Lethargic  [ ]Unarousable  [ ]Verbal  [ ]Non-Verbal [ ]Cachexia    BEHAVIORAL/PSYCH:  [ ]Alert and Oriented x  [ ] Anxiety [ ] Delirium [ ] Agitation [X ] Calm   EYES: [ ] No scleral icterus [ ] Scleral icterus [ ] Closed  ENMT:  [ ]Dry mouth  [ ]No external oral lesions [ X] No external ear or nose lesions  CARDIOVASCULAR:  [ ]Regular [ ]Irregular [ ]Tachy [ ]Not Tachy  [ ]Abdirahman [ ] Edema [ ] No edema  PULMONARY:  [ ]Tachypnea  [ ]Audible excessive secretions [X ] No labored breathing [ ] labored breathing  GASTROINTESTINAL: [ ]Soft  [ ]Distended  [ X]Not distended [ ]Non tender [ ]Tender  MUSCULOSKELETAL: [ ]No clubbing [ ] clubbing  [ X] No cyanosis [ ] cyanosis  NEUROLOGIC: [ ]No focal deficits  [ ]Follows commands  [ ]Does not follow commands  [ ]Cognitive impairment  [ ]Dysphagia  [ ]Dysarthria  [ ]Paresis   SKIN: [ ] Jaundiced [X ] Non-jaundiced [ ]Rash [ ]No Rash [ ] Warm [ ] Dry  MISC/LINES: [ ] ET tube [ ] Trach [ ]NGT/OGT [ ]PEG [ ]Shah    CRITICAL CARE:  [ ] Shock Present  [ ]Septic [ ]Cardiogenic [ ]Neurologic [ ]Hypovolemic  [ ]  Vasopressors [ ]  Inotropes   [ ]Respiratory failure present [ ]Mechanical ventilation [ ]Non-invasive ventilatory support [ ]High flow  [ ]Acute  [ ]Chronic [ ]Hypoxic  [ ]Hypercarbic [ ]Other  [ ]Other organ failure     LABS: reviewed by me, notable for: leukocytosis                        9.1    11.73 )-----------( 505      ( 16 May 2024 07:00 )             30.4   05-16    140  |  106  |  33<H>  ----------------------------<  189<H>  5.1<H>   |  24  |  1.2    Ca    9.4      16 May 2024 07:00        Urinalysis Basic - ( 16 May 2024 07:00 )    Color: x / Appearance: x / SG: x / pH: x  Gluc: 189 mg/dL / Ketone: x  / Bili: x / Urobili: x   Blood: x / Protein: x / Nitrite: x   Leuk Esterase: x / RBC: x / WBC x   Sq Epi: x / Non Sq Epi: x / Bacteria: x      RADIOLOGY & ADDITIONAL STUDIES: CXR personally reviewed by me: left opacity noted    PROTEIN CALORIE MALNUTRITION PRESENT: [ ]mild [ ]moderate [ ]severe [ ]underweight [ ]morbid obesity  https://www.andeal.org/vault/8737/web/files/ONC/Table_Clinical%20Characteristics%20to%20Document%20Malnutrition-White%20JV%20et%20al%202012.pdf    Height (cm): 154.9 (05-13-24 @ 14:25), 157.5 (05-01-24 @ 21:07)  Weight (kg): 79.1 (05-13-24 @ 14:25), 76 (05-01-24 @ 21:07), 77.1 (05-25-23 @ 12:26)  BMI (kg/m2): 33 (05-13-24 @ 14:25), 30.6 (05-01-24 @ 21:07)    [ ]PPSV2 < or = to 30% [ ]significant weight loss  [ ]poor nutritional intake  [ ]anasarca      [ ]Artificial Nutrition          Palliative Care Spiritual/Emotional Screening Tool Question  Severity (0-4):                    OR                    [X ] Unable to determine/NA  Score of 2 or greater indicates recommendation of Chaplaincy referral  Chaplaincy Referral: [ ] Yes [ ] Refused [ ] Following     Caregiver [place]:  [ ] Yes [ ] No    OR    [x ] Unable to determine. Will assess at later time if appropriate.  Social Work Referral [ ]  Patient and Family Centered Care Referral [ ]    Anticipatory Grief Present: [ ] Yes [ ] No    OR     [ x] Unable to determine. Will assess at later time if appropriate.  Social Work Referral [ ]  Patient and Family Centered Care Referral [ ]    REFERRALS:   [ ]Chaplaincy  [ ]Hospice  [ ]Child Life  [ ]Social Work  [ ]Case management [ ]Holistic Therapy     Palliative care education provided to patient and/or family    Goals of Care Document:     ______________  Eddie Whaley MD  Palliative Medicine  [facility]   of Geriatric and Palliative Medicine  [phone]

## 2024-05-16 NOTE — PROGRESS NOTE ADULT - ASSESSMENT
This is a 83 year-old female with PMH of dementia, hyperlipidemia, diabetes, COPD on 2 L  oxygen, and hypertension is here for shortness of breath and a fever.    IMPRESSION  #Acute on chronic hypoxic respiratory failure.  #Concern for gram negative pneumonia  #COPD on home oxygen. Possible exacerbation  #Pyuria- Urine cultures ESBL E.coli. Hard to differentiate if it is really symptomatic in her current mental condition.    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  #Recent MRSA nares negative.     RECOMMENDATIONS  -Recently managed for COPD exacerbation and possible CAP (Ceftriaxone/Azith)  -Okay to switch cefepime to ertapenem 1 gram q 24. Plan for 3 days (will cover for pneumonia and possible UTI)  -Steroids as per the primary team.  -Off loading to prevent pressure sores and preventive measures to avoid aspiration and delirium precautions.   -Patient will benefit from PCV 20 and RSV vaccines in 1-2 months outpatient.     If any questions, please send a message or call on [employer] Teams  Please continue to update ID with any pertinent new laboratory or radiographic findings.    Diony Clemente M.D  Infectious Diseases Attending/   [employer] [facility]

## 2024-05-16 NOTE — CDI QUERY NOTE - NSCDIOTHERTXTBX_GEN_ALL_CORE_HH
Clinical documentation and/or evidence in the medical record indicates that this patient has sepsis.  In order to ensure accurate coding and accuracy of the clinical record, the documentation in this patient’s medical record requires additional clarification.      Please include more specific documentation as to the type/status of sepsis in your progress note and/or discharge summary.   Please clarify if the patient was found to have:    •	Sepsis ruled in   •	Sepsis ruled out after study    •	Other (specify)    Supporting documentation and/or clinical evidence:   Flow Sheet: v/s:  2024:    T=  102.6,  HR=  116, RR=19,  93% on 15L/min nonrebreather mask  Labs: WBC= 18.52,    Bands=  12.0  ,  Platelet=  529,  Procalcitonin = 7.90,  Cr= 1.5  CXR:  2024: Impression: Left lower lobe infiltrate.  Documentation:   2024:  H/P:  Attending:  HPI: patient has been having shortness of breath and a fever since last night Tmax  100.5,   Home med: Zithromax   Assessment:  Acute respiratory failure    Sepsis    Pneumonia    2024:  Progress Note:  Attendin year-old female with --- COPD on 2 L  oxygen, and hypertension is here for shortness of breath and a fever. Admitted to Medicine for management of acute respiratory failure.  Problem List:   #Acute on chronic hypoxic respiratory failure 2/2 possible PNA  #ESBL E. coli UTI  #COPD exacerbation (home 2LNC)      Meds:  Cefepime   (2024- 2024),   Ertapenem  (2024 - 2024)

## 2024-05-16 NOTE — SWALLOW BEDSIDE ASSESSMENT ADULT - SLP GENERAL OBSERVATIONS
Pt. received in bed awake on O2 nasal cannula. Family member at b/s reported pt. has been eating less and less over the last few weeks. Demonstrating dementia feeding behaviors such as bolus holding, pocketing, and refusal of PO intake on certain days.
Pt. received in bed awake on RA.

## 2024-05-16 NOTE — PROGRESS NOTE ADULT - CONVERSATION DETAILS
I discussed the current plan of care, and prognosis were discussed with patient in details, all option were discussed ACLS process in details including CPR, shock, and intubation procedure.   Explained that duration of machines/inbutaion/pressor are unknown, and they are based on the underline issue.  Patient opted for FULL CODE with full understanding of what it involves in details

## 2024-05-17 LAB
GLUCOSE BLDC GLUCOMTR-MCNC: 149 MG/DL — HIGH (ref 70–99)
GLUCOSE BLDC GLUCOMTR-MCNC: 162 MG/DL — HIGH (ref 70–99)
GLUCOSE BLDC GLUCOMTR-MCNC: 169 MG/DL — HIGH (ref 70–99)
GLUCOSE BLDC GLUCOMTR-MCNC: 295 MG/DL — HIGH (ref 70–99)
GLUCOSE BLDC GLUCOMTR-MCNC: 319 MG/DL — HIGH (ref 70–99)

## 2024-05-17 PROCEDURE — 99232 SBSQ HOSP IP/OBS MODERATE 35: CPT

## 2024-05-17 RX ORDER — AMLODIPINE BESYLATE 2.5 MG/1
10 TABLET ORAL DAILY
Refills: 0 | Status: DISCONTINUED | OUTPATIENT
Start: 2024-05-17 | End: 2024-05-19

## 2024-05-17 RX ORDER — INSULIN LISPRO 100/ML
5 VIAL (ML) SUBCUTANEOUS ONCE
Refills: 0 | Status: COMPLETED | OUTPATIENT
Start: 2024-05-17 | End: 2024-05-17

## 2024-05-17 RX ADMIN — ERTAPENEM SODIUM 120 MILLIGRAM(S): 1 INJECTION, POWDER, LYOPHILIZED, FOR SOLUTION INTRAMUSCULAR; INTRAVENOUS at 17:36

## 2024-05-17 RX ADMIN — ATORVASTATIN CALCIUM 40 MILLIGRAM(S): 80 TABLET, FILM COATED ORAL at 21:30

## 2024-05-17 RX ADMIN — Medication 40 MILLIGRAM(S): at 13:32

## 2024-05-17 RX ADMIN — AMLODIPINE BESYLATE 5 MILLIGRAM(S): 2.5 TABLET ORAL at 05:10

## 2024-05-17 RX ADMIN — GABAPENTIN 100 MILLIGRAM(S): 400 CAPSULE ORAL at 21:30

## 2024-05-17 RX ADMIN — Medication 2: at 12:02

## 2024-05-17 RX ADMIN — HEPARIN SODIUM 5000 UNIT(S): 5000 INJECTION INTRAVENOUS; SUBCUTANEOUS at 21:30

## 2024-05-17 RX ADMIN — MEMANTINE HYDROCHLORIDE 10 MILLIGRAM(S): 10 TABLET ORAL at 17:37

## 2024-05-17 RX ADMIN — HEPARIN SODIUM 5000 UNIT(S): 5000 INJECTION INTRAVENOUS; SUBCUTANEOUS at 13:32

## 2024-05-17 RX ADMIN — Medication 30 MILLIGRAM(S): at 12:04

## 2024-05-17 RX ADMIN — MEMANTINE HYDROCHLORIDE 10 MILLIGRAM(S): 10 TABLET ORAL at 05:11

## 2024-05-17 RX ADMIN — Medication 1 PATCH: at 19:23

## 2024-05-17 RX ADMIN — HEPARIN SODIUM 5000 UNIT(S): 5000 INJECTION INTRAVENOUS; SUBCUTANEOUS at 05:10

## 2024-05-17 RX ADMIN — Medication 5 UNIT(S): at 21:29

## 2024-05-17 RX ADMIN — Medication 1 PATCH: at 07:00

## 2024-05-17 RX ADMIN — Medication 2: at 17:08

## 2024-05-17 NOTE — PROGRESS NOTE ADULT - ASSESSMENT
This is a 83 year-old female with PMH of dementia, hyperlipidemia, diabetes, COPD on 2 L  oxygen, and hypertension is here for shortness of breath and a fever.    IMPRESSION  #Acute on chronic hypoxic respiratory failure.  #Concern for gram negative pneumonia  #COPD on home oxygen. Possible exacerbation  #Pyuria- Urine cultures ESBL E.coli. Hard to differentiate if it is really symptomatic in her current mental condition.    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  #Recent MRSA nares negative.     RECOMMENDATIONS  -Recently managed for COPD exacerbation and possible CAP (Ceftriaxone/Azith)  -Switched from cefepime to ertapenem 1 gram q 24. Plan for 3 days (will cover for pneumonia and possible UTI). ED 5/18  -Steroids as per the primary team.  -Off loading to prevent pressure sores and preventive measures to avoid aspiration and delirium precautions.   -Patient will benefit from PCV 20 and RSV vaccines in 1-2 months outpatient.     If any questions, please send a message or call on [employer] Teams  Please continue to update ID with any pertinent new laboratory or radiographic findings.    Diony Clemente M.D  Infectious Diseases Attending/   [employer] at [facility]   This is a 83 year-old female with PMH of dementia, hyperlipidemia, diabetes, COPD on 2 L  oxygen, and hypertension is here for shortness of breath and a fever.    IMPRESSION  #Acute on chronic hypoxic respiratory failure.  #Concern for gram negative pneumonia  #COPD on home oxygen. Possible exacerbation  #Pyuria- Urine cultures ESBL E.coli. Hard to differentiate if it is really symptomatic in her current mental condition.    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  #Recent MRSA nares negative.     RECOMMENDATIONS  -Recently managed for COPD exacerbation and possible CAP (Ceftriaxone/Azith)  -Switched from cefepime to ertapenem 1 gram q 24. Plan for 3 days (will cover for pneumonia and possible UTI). ED 5/18  -Steroids as per the primary team.  -Off loading to prevent pressure sores and preventive measures to avoid aspiration and delirium precautions.   -Patient will benefit from PCV 20 and RSV vaccines in 1-2 months outpatient.     If any questions, please send a message or call on [employer] Teams  Please continue to update ID with any pertinent new laboratory or radiographic findings.    Diony Clemente M.D  Infectious Diseases Attending/   [employer] at [facility]

## 2024-05-17 NOTE — PROGRESS NOTE ADULT - SUBJECTIVE AND OBJECTIVE BOX
Progress note    INTERVAL HPI/OVERNIGHT EVENTS:    Patient seen and examined at bedside. She does not participate in encounter.      REVIEW OF SYSTEMS:  All other 13 Review of systems were reviewed and are negative    FAMILY HISTORY:  No pertinent family history in first degree relatives      T(C): 36.7 (05-17-24 @ 05:06), Max: 36.8 (05-16-24 @ 20:30)  HR: 61 (05-17-24 @ 05:06) (61 - 95)  BP: 156/84 (05-17-24 @ 05:26) (142/84 - 172/76)  RR: 18 (05-17-24 @ 05:06) (16 - 18)  SpO2: 97% (05-17-24 @ 05:06) (95% - 97%)  Wt(kg): --Vital Signs Last 24 Hrs  T(C): 36.7 (17 May 2024 05:06), Max: 36.8 (16 May 2024 20:30)  T(F): 98 (17 May 2024 05:06), Max: 98.3 (16 May 2024 20:30)  HR: 61 (17 May 2024 05:06) (61 - 95)  BP: 156/84 (17 May 2024 05:26) (142/84 - 172/76)  BP(mean): --  RR: 18 (17 May 2024 05:06) (16 - 18)  SpO2: 97% (17 May 2024 05:06) (95% - 97%)    Parameters below as of 17 May 2024 05:06  Patient On (Oxygen Delivery Method): nasal cannula  O2 Flow (L/min): 2    No Known Allergies      PHYSICAL EXAM:    GENERAL:  82y/o Female NAD, resting comfortably.  HEAD:  Atraumatic, Normocephalic  EYES: EOMI, PERRLA, conjunctiva and sclera clear  NECK: Supple, No JVD, no cervical lymphadenopathy, non-tender  CHEST/LUNG: shallow breathing  bilaterally; +mild wheeze,+ rhonchi, no  rales  HEART: Regular rate and rhythm; S1&S2  ABDOMEN: Soft, Nontender, Nondistended x 4 quadrants; Bowel sounds present  EXTREMITIES:   Peripheral Pulses Present, No clubbing, no cyanosis, or no edema, no calf tenderness  PSYCH:  demented  NEUROLOGY: at base line    Consultant(s) Notes Reviewed:  [x ] YES  [ ] NO  Care Discussed with Consultants/Other Providers [ x] YES  [ ] NO    LABS:      RADIOLOGY & ADDITIONAL TESTS:    Imaging Personally Reviewed:  [ ] YES  [ ] NO  acetaminophen     Tablet .. 650 milliGRAM(s) Oral every 6 hours PRN  albuterol/ipratropium for Nebulization 3 milliLiter(s) Nebulizer every 6 hours PRN  aluminum hydroxide/magnesium hydroxide/simethicone Suspension 30 milliLiter(s) Oral every 4 hours PRN  amLODIPine   Tablet 5 milliGRAM(s) Oral daily  atorvastatin 40 milliGRAM(s) Oral at bedtime  budesonide 160 MICROgram(s)/formoterol 4.5 MICROgram(s) Inhaler 2 Puff(s) Inhalation two times a day  dextrose 10% Bolus 125 milliLiter(s) IV Bolus once  dextrose 5%. 1000 milliLiter(s) IV Continuous <Continuous>  dextrose 5%. 1000 milliLiter(s) IV Continuous <Continuous>  dextrose 50% Injectable 25 Gram(s) IV Push once  dextrose 50% Injectable 12.5 Gram(s) IV Push once  dextrose Oral Gel 15 Gram(s) Oral once PRN  ertapenem  IVPB 1000 milliGRAM(s) IV Intermittent every 24 hours  gabapentin 100 milliGRAM(s) Oral at bedtime  glucagon  Injectable 1 milliGRAM(s) IntraMuscular once  heparin   Injectable 5000 Unit(s) SubCutaneous every 8 hours  insulin lispro (ADMELOG) corrective regimen sliding scale   SubCutaneous three times a day before meals  memantine 10 milliGRAM(s) Oral two times a day  methylPREDNISolone sodium succinate Injectable 40 milliGRAM(s) IV Push every 24 hours  ondansetron Injectable 4 milliGRAM(s) IV Push every 6 hours PRN  PARoxetine 30 milliGRAM(s) Oral daily      HEALTH ISSUES - PROBLEM Dx:    This is a 83 year-old female with PMH of dementia, hyperlipidemia, diabetes, COPD on 2 L  oxygen, and hypertension is here for shortness of breath and a fever. Admitted to Medicine for management of acute respiratory failure.    Problem List:     #Acute on chronic hypoxic respiratory failure 2/2 possible PNA  #ESBL E. coli UTI  #COPD exacerbation (home 2LNC)    #HTN/HLD  #T2DM  #KORI on CKD 3   #AOCKD   #Obesity (BMI 33)  #Dementia    Plan:  2 L bolus given in the ED  CXR: Left lower lobe infiltrate   cr 1.5 , baseline 1.2  ID recs appreciated   -s/p IVF  -follow up BCx, UCx shows ESBL E. coli   -Dc cefepime, start ertapenem  -MRSA recent negative   -f/u atypical PNA labs   -supplemental oxygen to maintain PO2 88-92%; wean to home 2LNC if tolerated   -Duonebs q6h PRN  -c/w home inhaler  (symbicort 2 puffs BID)  -Methylpred 40mg q12 -->qd-->prednisone PO 40mg qd  -avoid nephrotoxic meds / renally dose medications / monitor renal fxn daily   -hold PO T2DM home meds  -ISS for glucose >200 / monitor FS TIDAC/HS   -Pt unable to swallow nifedipine, will try start clonidine patch temporarily for now. Increase amlodipine to 10mg qd. If bp not controlled by tomorrow, will add ACE/ARB  -c/w statin   -c/w memantine   -PT evaluation, OOB    DVT prophylaxis: HSQ  CODE status FULL  Dispo: pending SNF   Progress note    INTERVAL HPI/OVERNIGHT EVENTS:    Patient seen and examined at bedside. She does not participate in encounter.      REVIEW OF SYSTEMS:  All other 13 Review of systems were reviewed and are negative    FAMILY HISTORY:  No pertinent family history in first degree relatives      T(C): 36.7 (05-17-24 @ 05:06), Max: 36.8 (05-16-24 @ 20:30)  HR: 61 (05-17-24 @ 05:06) (61 - 95)  BP: 156/84 (05-17-24 @ 05:26) (142/84 - 172/76)  RR: 18 (05-17-24 @ 05:06) (16 - 18)  SpO2: 97% (05-17-24 @ 05:06) (95% - 97%)  Wt(kg): --Vital Signs Last 24 Hrs  T(C): 36.7 (17 May 2024 05:06), Max: 36.8 (16 May 2024 20:30)  T(F): 98 (17 May 2024 05:06), Max: 98.3 (16 May 2024 20:30)  HR: 61 (17 May 2024 05:06) (61 - 95)  BP: 156/84 (17 May 2024 05:26) (142/84 - 172/76)  BP(mean): --  RR: 18 (17 May 2024 05:06) (16 - 18)  SpO2: 97% (17 May 2024 05:06) (95% - 97%)    Parameters below as of 17 May 2024 05:06  Patient On (Oxygen Delivery Method): nasal cannula  O2 Flow (L/min): 2    No Known Allergies      PHYSICAL EXAM:    GENERAL:  84y/o Female NAD, resting comfortably.  HEAD:  Atraumatic, Normocephalic  EYES: EOMI, PERRLA, conjunctiva and sclera clear  NECK: Supple, No JVD, no cervical lymphadenopathy, non-tender  CHEST/LUNG: shallow breathing  bilaterally; +mild wheeze,+ rhonchi, no  rales  HEART: Regular rate and rhythm; S1&S2  ABDOMEN: Soft, Nontender, Nondistended x 4 quadrants; Bowel sounds present  EXTREMITIES:   Peripheral Pulses Present, No clubbing, no cyanosis, or no edema, no calf tenderness  PSYCH:  demented  NEUROLOGY: at base line    Consultant(s) Notes Reviewed:  [x ] YES  [ ] NO  Care Discussed with Consultants/Other Providers [ x] YES  [ ] NO    LABS:      RADIOLOGY & ADDITIONAL TESTS:    Imaging Personally Reviewed:  [ ] YES  [ ] NO  acetaminophen     Tablet .. 650 milliGRAM(s) Oral every 6 hours PRN  albuterol/ipratropium for Nebulization 3 milliLiter(s) Nebulizer every 6 hours PRN  aluminum hydroxide/magnesium hydroxide/simethicone Suspension 30 milliLiter(s) Oral every 4 hours PRN  amLODIPine   Tablet 5 milliGRAM(s) Oral daily  atorvastatin 40 milliGRAM(s) Oral at bedtime  budesonide 160 MICROgram(s)/formoterol 4.5 MICROgram(s) Inhaler 2 Puff(s) Inhalation two times a day  dextrose 10% Bolus 125 milliLiter(s) IV Bolus once  dextrose 5%. 1000 milliLiter(s) IV Continuous <Continuous>  dextrose 5%. 1000 milliLiter(s) IV Continuous <Continuous>  dextrose 50% Injectable 25 Gram(s) IV Push once  dextrose 50% Injectable 12.5 Gram(s) IV Push once  dextrose Oral Gel 15 Gram(s) Oral once PRN  ertapenem  IVPB 1000 milliGRAM(s) IV Intermittent every 24 hours  gabapentin 100 milliGRAM(s) Oral at bedtime  glucagon  Injectable 1 milliGRAM(s) IntraMuscular once  heparin   Injectable 5000 Unit(s) SubCutaneous every 8 hours  insulin lispro (ADMELOG) corrective regimen sliding scale   SubCutaneous three times a day before meals  memantine 10 milliGRAM(s) Oral two times a day  methylPREDNISolone sodium succinate Injectable 40 milliGRAM(s) IV Push every 24 hours  ondansetron Injectable 4 milliGRAM(s) IV Push every 6 hours PRN  PARoxetine 30 milliGRAM(s) Oral daily      HEALTH ISSUES - PROBLEM Dx:    This is a 83 year-old female with PMH of dementia, hyperlipidemia, diabetes, COPD on 2 L  oxygen, and hypertension is here for shortness of breath and a fever. Admitted to Medicine for management of acute respiratory failure.    Problem List:     #Acute on chronic hypoxic respiratory failure 2/2 possible PNA  #Sepsis ruled in with fever 102.6,  2/2 Above PNA vs ESBL E. coli UTI  #COPD exacerbation (home 2LNC)    #HTN/HLD  #T2DM  #KORI on CKD 3   #AOCKD   #Obesity (BMI 33)  #Dementia    Plan:  2 L bolus given in the ED  CXR: Left lower lobe infiltrate   cr 1.5 , baseline 1.2  ID recs appreciated   -s/p IVF  -follow up BCx, UCx shows ESBL E. coli   -Dc cefepime, start ertapenem  -MRSA recent negative   -f/u atypical PNA labs   -supplemental oxygen to maintain PO2 88-92%; wean to home 2LNC if tolerated   -Duonebs q6h PRN  -c/w home inhaler  (symbicort 2 puffs BID)  -Methylpred 40mg q12 -->qd-->prednisone PO 40mg qd  -avoid nephrotoxic meds / renally dose medications / monitor renal fxn daily   -hold PO T2DM home meds  -ISS for glucose >200 / monitor FS TIDAC/HS   -Pt unable to swallow nifedipine, will try start clonidine patch temporarily for now. Increase amlodipine to 10mg qd. If bp not controlled by tomorrow, will add ACE/ARB  -c/w statin   -c/w memantine   -PT evaluation, OOB    DVT prophylaxis: HSQ  CODE status FULL  Dispo: pending SNF   Progress note    INTERVAL HPI/OVERNIGHT EVENTS:    Patient seen and examined at bedside. She does not participate in encounter.      REVIEW OF SYSTEMS:  All other 13 Review of systems were reviewed and are negative    FAMILY HISTORY:  No pertinent family history in first degree relatives      T(C): 36.7 (05-17-24 @ 05:06), Max: 36.8 (05-16-24 @ 20:30)  HR: 61 (05-17-24 @ 05:06) (61 - 95)  BP: 156/84 (05-17-24 @ 05:26) (142/84 - 172/76)  RR: 18 (05-17-24 @ 05:06) (16 - 18)  SpO2: 97% (05-17-24 @ 05:06) (95% - 97%)  Wt(kg): --Vital Signs Last 24 Hrs  T(C): 36.7 (17 May 2024 05:06), Max: 36.8 (16 May 2024 20:30)  T(F): 98 (17 May 2024 05:06), Max: 98.3 (16 May 2024 20:30)  HR: 61 (17 May 2024 05:06) (61 - 95)  BP: 156/84 (17 May 2024 05:26) (142/84 - 172/76)  BP(mean): --  RR: 18 (17 May 2024 05:06) (16 - 18)  SpO2: 97% (17 May 2024 05:06) (95% - 97%)    Parameters below as of 17 May 2024 05:06  Patient On (Oxygen Delivery Method): nasal cannula  O2 Flow (L/min): 2    No Known Allergies      PHYSICAL EXAM:    GENERAL:  84y/o Female NAD, resting comfortably.  HEAD:  Atraumatic, Normocephalic  EYES: EOMI, PERRLA, conjunctiva and sclera clear  NECK: Supple, No JVD, no cervical lymphadenopathy, non-tender  CHEST/LUNG: shallow breathing  bilaterally; +mild wheeze,+ rhonchi, no  rales  HEART: Regular rate and rhythm; S1&S2  ABDOMEN: Soft, Nontender, Nondistended x 4 quadrants; Bowel sounds present  EXTREMITIES:   Peripheral Pulses Present, No clubbing, no cyanosis, or no edema, no calf tenderness  PSYCH:  demented  NEUROLOGY: at base line    Consultant(s) Notes Reviewed:  [x ] YES  [ ] NO  Care Discussed with Consultants/Other Providers [ x] YES  [ ] NO    LABS:      RADIOLOGY & ADDITIONAL TESTS:    Imaging Personally Reviewed:  [ ] YES  [ ] NO  acetaminophen     Tablet .. 650 milliGRAM(s) Oral every 6 hours PRN  albuterol/ipratropium for Nebulization 3 milliLiter(s) Nebulizer every 6 hours PRN  aluminum hydroxide/magnesium hydroxide/simethicone Suspension 30 milliLiter(s) Oral every 4 hours PRN  amLODIPine   Tablet 5 milliGRAM(s) Oral daily  atorvastatin 40 milliGRAM(s) Oral at bedtime  budesonide 160 MICROgram(s)/formoterol 4.5 MICROgram(s) Inhaler 2 Puff(s) Inhalation two times a day  dextrose 10% Bolus 125 milliLiter(s) IV Bolus once  dextrose 5%. 1000 milliLiter(s) IV Continuous <Continuous>  dextrose 5%. 1000 milliLiter(s) IV Continuous <Continuous>  dextrose 50% Injectable 25 Gram(s) IV Push once  dextrose 50% Injectable 12.5 Gram(s) IV Push once  dextrose Oral Gel 15 Gram(s) Oral once PRN  ertapenem  IVPB 1000 milliGRAM(s) IV Intermittent every 24 hours  gabapentin 100 milliGRAM(s) Oral at bedtime  glucagon  Injectable 1 milliGRAM(s) IntraMuscular once  heparin   Injectable 5000 Unit(s) SubCutaneous every 8 hours  insulin lispro (ADMELOG) corrective regimen sliding scale   SubCutaneous three times a day before meals  memantine 10 milliGRAM(s) Oral two times a day  methylPREDNISolone sodium succinate Injectable 40 milliGRAM(s) IV Push every 24 hours  ondansetron Injectable 4 milliGRAM(s) IV Push every 6 hours PRN  PARoxetine 30 milliGRAM(s) Oral daily      HEALTH ISSUES - PROBLEM Dx:    This is a 83 year-old female with PMH of dementia, hyperlipidemia, diabetes, COPD on 2 L  oxygen, and hypertension is here for shortness of breath and a fever. Admitted to Medicine for management of acute respiratory failure.    Problem List:     #Acute on chronic hypoxic respiratory failure 2/2 possible Gram (-) PNA  #Sepsis ruled in with fever 102.6,  2/2 Above PNA vs ESBL E. coli UTI  #COPD exacerbation (home 2LNC)    #HTN/HLD  #T2DM  #KORI on CKD 3   #AOCKD   #Obesity (BMI 33)  #Dementia    Plan:  2 L bolus given in the ED  CXR: Left lower lobe infiltrate   cr 1.5 , baseline 1.2  ID recs appreciated   -s/p IVF  -follow up BCx, UCx shows ESBL E. coli   -Dc cefepime, start ertapenem  -MRSA recent negative   -f/u atypical PNA labs   -supplemental oxygen to maintain PO2 88-92%; wean to home 2LNC if tolerated   -Duonebs q6h PRN  -c/w home inhaler  (symbicort 2 puffs BID)  -Methylpred 40mg q12 -->qd-->prednisone PO 40mg qd  -avoid nephrotoxic meds / renally dose medications / monitor renal fxn daily   -hold PO T2DM home meds  -ISS for glucose >200 / monitor FS TIDAC/HS   -Pt unable to swallow nifedipine, will try start clonidine patch temporarily for now. Increase amlodipine to 10mg qd. If bp not controlled by tomorrow, will add ACE/ARB  -c/w statin   -c/w memantine   -PT evaluation, OOB    DVT prophylaxis: HSQ  CODE status FULL  Dispo: pending SNF

## 2024-05-17 NOTE — PROGRESS NOTE ADULT - SUBJECTIVE AND OBJECTIVE BOX
SABRINA DOMINGUEZ  83y, Female    LOS  4d    INTERVAL EVENTS/HPI  - No acute events overnight  - T(F): , Max: 98.3 (05-16-24 @ 20:30)  - WBC Count: 11.73 (05-16-24 @ 07:00)  - Creatinine: 1.2 (05-16-24 @ 07:00)    REVIEW OF SYSTEMS:  CONSTITUTIONAL: No fever or chills  HEENT: No sore throat  RESPIRATORY: No cough, no shortness of breath  CARDIOVASCULAR: No chest pain or palpitations  GASTROINTESTINAL: No abdominal or epigastric pain  GENITOURINARY: No dysuria  NEUROLOGICAL: No headache/dizziness  MSK: No joint pain, erythema, or swelling; no back pain  SKIN: No itching, rashes  All other ROS negative except noted above    Prior hospital charts reviewed [Yes]  Primary team notes reviewed [Yes]  Other consultant notes reviewed [Yes]    ANTIMICROBIALS:   ertapenem  IVPB 1000 every 24 hours      OTHER MEDS: MEDICATIONS  (STANDING):  acetaminophen     Tablet .. 650 every 6 hours PRN  albuterol/ipratropium for Nebulization 3 every 6 hours PRN  aluminum hydroxide/magnesium hydroxide/simethicone Suspension 30 every 4 hours PRN  amLODIPine   Tablet 5 daily  atorvastatin 40 at bedtime  budesonide 160 MICROgram(s)/formoterol 4.5 MICROgram(s) Inhaler 2 two times a day  dextrose 50% Injectable 12.5 once  dextrose 50% Injectable 25 once  dextrose Oral Gel 15 once PRN  gabapentin 100 at bedtime  glucagon  Injectable 1 once  heparin   Injectable 5000 every 8 hours  insulin lispro (ADMELOG) corrective regimen sliding scale  three times a day before meals  memantine 10 two times a day  methylPREDNISolone sodium succinate Injectable 40 every 24 hours  ondansetron Injectable 4 every 6 hours PRN  PARoxetine 30 daily      Vital Signs Last 24 Hrs  T(F): 98 (05-17-24 @ 05:06), Max: 102.6 (05-12-24 @ 23:59)    Vital Signs Last 24 Hrs  HR: 61 (05-17-24 @ 05:06) (61 - 95)  BP: 156/84 (05-17-24 @ 05:26) (142/84 - 172/76)  RR: 18 (05-17-24 @ 05:06)  SpO2: 97% (05-17-24 @ 05:06) (95% - 97%)  Wt(kg): --    EXAM:  GENERAL: On NC.   HEAD: No head lesions  NECK: Supple  CHEST/LUNG: Shallow breath sounds.   HEART: S1 S2  ABDOMEN: Soft, nontender  EXTREMITIES: No clubbing, cyanosis, or petal edema  NERVOUS SYSTEM: Alert and oriented to person  MSK: No joint erythema, swelling or pain  SKIN: No rashes or lesions, no superficial thrombophlebitis    Labs:                        9.1    11.73 )-----------( 505      ( 16 May 2024 07:00 )             30.4     05-16    140  |  106  |  33<H>  ----------------------------<  189<H>  5.1<H>   |  24  |  1.2    Ca    9.4      16 May 2024 07:00        WBC Trend:  WBC Count: 11.73 (05-16-24 @ 07:00)  WBC Count: 16.56 (05-14-24 @ 06:25)  WBC Count: 14.16 (05-13-24 @ 06:54)  WBC Count: 18.52 (05-13-24 @ 00:43)      Creatine Trend:  Creatinine: 1.2 (05-16)  Creatinine: 1.3 (05-14)  Creatinine: 1.4 (05-13)  Creatinine: 1.5 (05-13)      Liver Biochemical Testing Trend:  Alanine Aminotransferase (ALT/SGPT): 14 (05-14)  Alanine Aminotransferase (ALT/SGPT): 13 (05-13)  Alanine Aminotransferase (ALT/SGPT): 35 (05-03)  Alanine Aminotransferase (ALT/SGPT): 26 (05-02)  Alanine Aminotransferase (ALT/SGPT): 21 (05-01)  Aspartate Aminotransferase (AST/SGOT): 14 (05-14-24 @ 06:25)  Aspartate Aminotransferase (AST/SGOT): 13 (05-13-24 @ 00:43)  Aspartate Aminotransferase (AST/SGOT): 33 (05-03-24 @ 07:13)  Aspartate Aminotransferase (AST/SGOT): 34 (05-02-24 @ 06:12)  Aspartate Aminotransferase (AST/SGOT): 34 (05-01-24 @ 10:09)  Bilirubin Total: 0.2 (05-14)  Bilirubin Total: 0.3 (05-13)  Bilirubin Total: <0.2 (05-03)  Bilirubin Total: <0.2 (05-02)  Bilirubin Total: 0.3 (05-01)      Trend LDH      Urinalysis Basic - ( 16 May 2024 07:00 )    Color: x / Appearance: x / SG: x / pH: x  Gluc: 189 mg/dL / Ketone: x  / Bili: x / Urobili: x   Blood: x / Protein: x / Nitrite: x   Leuk Esterase: x / RBC: x / WBC x   Sq Epi: x / Non Sq Epi: x / Bacteria: x        MICROBIOLOGY:    Female    Urinalysis with Rflx Culture (collected 13 May 2024 06:23)    Culture - Urine (collected 13 May 2024 06:23)  Source: Clean Catch None  Final Report:    >100,000 CFU/ml Escherichia coli ESBL  Organism: Escherichia coli ESBL  Organism: Escherichia coli ESBL    Sensitivities:      -  Levofloxacin: R >4      -  Tobramycin: R >8      -  Nitrofurantoin: S <=32 Should not be used to treat pyelonephritis      -  Aztreonam: R >16      -  Gentamicin: S <=2      -  Cefazolin: R >16 For uncomplicated UTI with K. pneumoniae, E. coli, or P. mirablis: LEAH <=16 is sensitive and LEAH >=32 is resistant. This also predicts results for oral agents cefaclor, cefdinir, cefpodoxime, cefprozil, cefuroxime axetil, cephalexin and locarbef for uncomplicated UTI. Note that some isolates may be susceptible to these agents while testing resistant to cefazolin.      -  Cefepime: R >16      -  Piperacillin/Tazobactam: S <=8      -  Ciprofloxacin: R >2      -  Imipenem: S <=1      -  Ceftriaxone: R >32      -  Ampicillin: R >16 These ampicillin results predict results for amoxicillin      Method Type: LEAH      -  Meropenem: S <=1      -  Ampicillin/Sulbactam: R >16/8      -  Cefuroxime: R >16      -  Amoxicillin/Clavulanic Acid: I 16/8      -  Trimethoprim/Sulfamethoxazole: R >2/38      -  Ertapenem: S <=0.5    Culture - Blood (collected 13 May 2024 00:43)  Source: .Blood Blood  Preliminary Report:    No growth at 72 Hours    Culture - Blood (collected 13 May 2024 00:43)  Source: .Blood Blood  Preliminary Report:    No growth at 72 Hours    Urinalysis with Rflx Culture (collected 01 May 2024 10:40)    Culture - Urine (collected 01 May 2024 10:40)  Source: Catheterized None  Final Report:    >=3 organisms. Probable collection contamination.    Culture - Blood (collected 01 May 2024 10:09)  Source: .Blood Blood-Peripheral  Final Report:    No growth at 5 days    Culture - Blood (collected 01 May 2024 10:09)  Source: .Blood Blood-Peripheral  Final Report:    No growth at 5 days    Culture - Urine (collected 19 May 2023 16:31)  Source: Clean Catch Clean Catch (Midstream)  Final Report:    No growth  Legionella Antigen, Urine: Negative (05-13 @ 06:23)    Procalcitonin: 7.90 (05-13)    Troponin T, High Sensitivity Result: 26 (05-15)  Troponin T, High Sensitivity Result: 37 (05-13)    RADIOLOGY & ADDITIONAL TESTS:  I have personally reviewed the relevant images.   CXR      CT  < from: Xray Chest 1 View-PORTABLE IMMEDIATE (05.13.24 @ 01:33) >  INTERPRETATION:  Clinical History / Reason for exam: Shortness of breath    Comparison : Chest radiograph May 1, 2024.    Technique/Positioning: AP chest.    Findings:    Support devices: None.    Cardiac/mediastinum/hilum: Unremarkable.    Lung parenchyma/Pleura: Left lower lobe infiltrate.    Skeleton/soft tissues: Degenerative changes.    Impression:    Left lower lobe infiltrate.        --- End of Report ---    < end of copied text >        WEIGHT  Weight (kg): 79.1 (05-13-24 @ 14:25)      All available historical records have been reviewed

## 2024-05-17 NOTE — PROGRESS NOTE ADULT - PROVIDER SPECIALTY LIST ADULT
Hospitalist
Infectious Disease
Infectious Disease
Hospitalist
Infectious Disease

## 2024-05-18 ENCOUNTER — TRANSCRIPTION ENCOUNTER (OUTPATIENT)
Age: 84
End: 2024-05-18

## 2024-05-18 VITALS
RESPIRATION RATE: 18 BRPM | HEART RATE: 71 BPM | OXYGEN SATURATION: 93 % | SYSTOLIC BLOOD PRESSURE: 148 MMHG | TEMPERATURE: 98 F | DIASTOLIC BLOOD PRESSURE: 73 MMHG

## 2024-05-18 LAB
ANION GAP SERPL CALC-SCNC: 10 MMOL/L — SIGNIFICANT CHANGE UP (ref 7–14)
ANION GAP SERPL CALC-SCNC: 11 MMOL/L — SIGNIFICANT CHANGE UP (ref 7–14)
BUN SERPL-MCNC: 26 MG/DL — HIGH (ref 10–20)
BUN SERPL-MCNC: 27 MG/DL — HIGH (ref 10–20)
CALCIUM SERPL-MCNC: 9.3 MG/DL — SIGNIFICANT CHANGE UP (ref 8.4–10.5)
CALCIUM SERPL-MCNC: 9.3 MG/DL — SIGNIFICANT CHANGE UP (ref 8.4–10.5)
CHLORIDE SERPL-SCNC: 103 MMOL/L — SIGNIFICANT CHANGE UP (ref 98–110)
CHLORIDE SERPL-SCNC: 104 MMOL/L — SIGNIFICANT CHANGE UP (ref 98–110)
CO2 SERPL-SCNC: 24 MMOL/L — SIGNIFICANT CHANGE UP (ref 17–32)
CO2 SERPL-SCNC: 26 MMOL/L — SIGNIFICANT CHANGE UP (ref 17–32)
CREAT SERPL-MCNC: 1 MG/DL — SIGNIFICANT CHANGE UP (ref 0.7–1.5)
CREAT SERPL-MCNC: 1.1 MG/DL — SIGNIFICANT CHANGE UP (ref 0.7–1.5)
CULTURE RESULTS: SIGNIFICANT CHANGE UP
CULTURE RESULTS: SIGNIFICANT CHANGE UP
EGFR: 49 ML/MIN/1.73M2 — LOW
EGFR: 55 ML/MIN/1.73M2 — LOW
GLUCOSE BLDC GLUCOMTR-MCNC: 153 MG/DL — HIGH (ref 70–99)
GLUCOSE BLDC GLUCOMTR-MCNC: 196 MG/DL — HIGH (ref 70–99)
GLUCOSE BLDC GLUCOMTR-MCNC: 260 MG/DL — HIGH (ref 70–99)
GLUCOSE SERPL-MCNC: 161 MG/DL — HIGH (ref 70–99)
GLUCOSE SERPL-MCNC: 215 MG/DL — HIGH (ref 70–99)
HCT VFR BLD CALC: 33.5 % — LOW (ref 37–47)
HGB BLD-MCNC: 10.2 G/DL — LOW (ref 12–16)
MCHC RBC-ENTMCNC: 25.1 PG — LOW (ref 27–31)
MCHC RBC-ENTMCNC: 30.4 G/DL — LOW (ref 32–37)
MCV RBC AUTO: 82.5 FL — SIGNIFICANT CHANGE UP (ref 81–99)
NRBC # BLD: 0 /100 WBCS — SIGNIFICANT CHANGE UP (ref 0–0)
PLATELET # BLD AUTO: 537 K/UL — HIGH (ref 130–400)
PMV BLD: 9.9 FL — SIGNIFICANT CHANGE UP (ref 7.4–10.4)
POTASSIUM SERPL-MCNC: 4.7 MMOL/L — SIGNIFICANT CHANGE UP (ref 3.5–5)
POTASSIUM SERPL-MCNC: 5.4 MMOL/L — HIGH (ref 3.5–5)
POTASSIUM SERPL-SCNC: 4.7 MMOL/L — SIGNIFICANT CHANGE UP (ref 3.5–5)
POTASSIUM SERPL-SCNC: 5.4 MMOL/L — HIGH (ref 3.5–5)
RBC # BLD: 4.06 M/UL — LOW (ref 4.2–5.4)
RBC # FLD: 17 % — HIGH (ref 11.5–14.5)
SODIUM SERPL-SCNC: 137 MMOL/L — SIGNIFICANT CHANGE UP (ref 135–146)
SODIUM SERPL-SCNC: 141 MMOL/L — SIGNIFICANT CHANGE UP (ref 135–146)
SPECIMEN SOURCE: SIGNIFICANT CHANGE UP
SPECIMEN SOURCE: SIGNIFICANT CHANGE UP
WBC # BLD: 9.51 K/UL — SIGNIFICANT CHANGE UP (ref 4.8–10.8)
WBC # FLD AUTO: 9.51 K/UL — SIGNIFICANT CHANGE UP (ref 4.8–10.8)

## 2024-05-18 PROCEDURE — 99239 HOSP IP/OBS DSCHRG MGMT >30: CPT

## 2024-05-18 RX ORDER — AMLODIPINE BESYLATE 2.5 MG/1
1 TABLET ORAL
Qty: 30 | Refills: 0
Start: 2024-05-18 | End: 2024-06-16

## 2024-05-18 RX ORDER — NIFEDIPINE 30 MG
1 TABLET, EXTENDED RELEASE 24 HR ORAL
Qty: 0 | Refills: 0 | DISCHARGE

## 2024-05-18 RX ORDER — ALBUTEROL 90 UG/1
1 AEROSOL, METERED ORAL
Qty: 0 | Refills: 0 | DISCHARGE

## 2024-05-18 RX ORDER — SODIUM ZIRCONIUM CYCLOSILICATE 10 G/10G
10 POWDER, FOR SUSPENSION ORAL ONCE
Refills: 0 | Status: COMPLETED | OUTPATIENT
Start: 2024-05-18 | End: 2024-05-18

## 2024-05-18 RX ADMIN — Medication 6: at 11:40

## 2024-05-18 RX ADMIN — HEPARIN SODIUM 5000 UNIT(S): 5000 INJECTION INTRAVENOUS; SUBCUTANEOUS at 05:42

## 2024-05-18 RX ADMIN — MEMANTINE HYDROCHLORIDE 10 MILLIGRAM(S): 10 TABLET ORAL at 17:01

## 2024-05-18 RX ADMIN — Medication 2: at 17:01

## 2024-05-18 RX ADMIN — MEMANTINE HYDROCHLORIDE 10 MILLIGRAM(S): 10 TABLET ORAL at 05:42

## 2024-05-18 RX ADMIN — SODIUM ZIRCONIUM CYCLOSILICATE 10 GRAM(S): 10 POWDER, FOR SUSPENSION ORAL at 11:36

## 2024-05-18 RX ADMIN — AMLODIPINE BESYLATE 10 MILLIGRAM(S): 2.5 TABLET ORAL at 05:42

## 2024-05-18 RX ADMIN — Medication 1 PATCH: at 11:39

## 2024-05-18 RX ADMIN — Medication 40 MILLIGRAM(S): at 05:43

## 2024-05-18 RX ADMIN — GABAPENTIN 100 MILLIGRAM(S): 400 CAPSULE ORAL at 21:08

## 2024-05-18 RX ADMIN — Medication 2: at 08:37

## 2024-05-18 RX ADMIN — HEPARIN SODIUM 5000 UNIT(S): 5000 INJECTION INTRAVENOUS; SUBCUTANEOUS at 13:32

## 2024-05-18 RX ADMIN — Medication 1 PATCH: at 17:29

## 2024-05-18 RX ADMIN — ATORVASTATIN CALCIUM 40 MILLIGRAM(S): 80 TABLET, FILM COATED ORAL at 21:08

## 2024-05-18 RX ADMIN — Medication 30 MILLIGRAM(S): at 11:36

## 2024-05-18 NOTE — DISCHARGE NOTE PROVIDER - HOSPITAL COURSE
This is a 83 year-old female with PMHX of dementia, hyperlipidemia, diabetes, COPD on 2 L  oxygen, and hypertension is here for shortness of breath and a fever. Admitted to Medicine for management of acute respiratory failure.    Problem List:     #Acute on chronic hypoxic respiratory failure 2/2 possible Gram (-) PNA  #Sepsis ruled in with fever 102.6,  2/2 Above PNA vs ESBL E. coli UTI  #COPD exacerbation (home 2LNC)    #HTN/HLD  #T2DM  #KORI on CKD 3   #AOCKD   #Obesity (BMI 33)  #Dementia    Plan:  2 L bolus given in the ED  CXR: Left lower lobe infiltrate   cr 1.5 , baseline 1.2  ID recs appreciated   -s/p IVF  -follow up BCx, UCx shows ESBL E. coli   -Dc cefepime, completed course of ertapenem  -MRSA recent negative   -supplemental oxygen to maintain PO2 88-92%; wean to home 2LNC if tolerated   -Duonebs q6h PRN  -c/w home inhaler  (symbicort 2 puffs BID)  -Methylpred 40mg q12 -->qd-->prednisone PO 40mg qd  -avoid nephrotoxic meds / renally dose medications / monitor renal fxn daily   -hold PO T2DM home meds  -ISS for glucose >200 / monitor FS TIDAC/HS   -Pt unable to swallow nifedipine, will try start clonidine patch temporarily for now. Increase amlodipine to 10mg qd. If bp not controlled by tomorrow, will add ACE/ARB  -c/w statin   -c/w memantine   -PT evaluation, OOB   This is a 83 year-old female with PMHX of dementia, hyperlipidemia, diabetes, COPD on 2 L  oxygen, and hypertension is here for shortness of breath and a fever. Admitted to Medicine for management of acute respiratory failure.    Problem List:     #Acute on chronic hypoxic respiratory failure 2/2 possible Gram (-) PNA  #Sepsis ruled in with fever 102.6,  2/2 Above PNA vs ESBL E. coli UTI  #COPD exacerbation (home 2LNC)    #HTN/HLD  #T2DM  #KORI on CKD 3   #AOCKD   #Obesity (BMI 33)  #Dementia    Plan:  2 L bolus given in the ED  CXR: Left lower lobe infiltrate   Cr at baseline s/p IVF  ID recs appreciated   -follow up BCx, UCx shows ESBL E. coli   -Dc cefepime, completed course of ertapenem  -MRSA recent negative   -supplemental oxygen to maintain PO2 88-92%; wean to home 2LNC if tolerated   -Duonebs q6h PRN, Symbicort  -Methylpred 40mg q12 -->qd-->prednisone PO 40mg qd  -avoid nephrotoxic meds / renally dose medications / monitor renal fxn daily   -hold PO T2DM home meds  -ISS for glucose >200 / monitor FS TIDAC/HS   -C/w amlodipine, statin, memantine  -PT evaluation, OOB

## 2024-05-18 NOTE — DISCHARGE NOTE PROVIDER - CARE PROVIDER_API CALL
Vinicio Sena  Internal Medicine  [address] [place] [address]  Phone: [phone]  Fax: [phone]  Follow Up Time: Routine

## 2024-05-18 NOTE — DISCHARGE NOTE PROVIDER - NSDCMRMEDTOKEN_GEN_ALL_CORE_FT
albuterol: 1 puff(s) inhaled every 8 hours as needed for  cough  amLODIPine 10 mg oral tablet: 1 tab(s) orally once a day  Breo Ellipta 200 mcg-25 mcg/inh inhalation powder: 1 puff(s) inhaled once a day  gabapentin 100 mg oral capsule: orally once a day (at bedtime)  memantine 10 mg oral tablet: 1 tab(s) orally 2 times a day  metFORMIN 500 mg oral tablet: 1 tab(s) orally 2 times a day  PARoxetine 30 mg oral tablet: 1 tab(s) orally once a day  rosuvastatin 10 mg oral tablet: 1 tab(s) orally once a day   albuterol: 1 puff(s) inhaled every 8 hours as needed for  shortness of breath and/or wheezing  amLODIPine 10 mg oral tablet: 1 tab(s) orally once a day  Breo Ellipta 200 mcg-25 mcg/inh inhalation powder: 1 puff(s) inhaled once a day  gabapentin 100 mg oral capsule: orally once a day (at bedtime)  memantine 10 mg oral tablet: 1 tab(s) orally 2 times a day  metFORMIN 500 mg oral tablet: 1 tab(s) orally 2 times a day  PARoxetine 30 mg oral tablet: 1 tab(s) orally once a day  predniSONE 20 mg oral tablet: 1 tab(s) orally once a day  rosuvastatin 10 mg oral tablet: 1 tab(s) orally once a day

## 2024-05-18 NOTE — DISCHARGE NOTE PROVIDER - NSDCHHCONTACT_GEN_ALL_CORE_FT
Foot repositioned on pillows for comfort. Awaiting dispo from provider  
Patient resting comfortably at 0600 rounds. Patient called out at 0620 complaining of shortness of air. This RN arrived in the patient's room and found patient's HR to be in the 130's and O2 sat 75%. Rapid response called.  Code blue called shortly after.      
Pt states that he has had foot pain since Friday and today it has turned purple and become swollen when not elevated. When he elevates it, color changes to reddish, white mottled. His right leg and foot is warmer to touch that left leg and foot. Bilateral pulses equal and strong. He denies pain while in in bed but states it becomes painful when up on his foot. Pt has crutches that he has been using to get around at home. Wife at bedside.  
Pt was receiving CPR on my arrival to shift, he achieved ROSC around 0715.  Pt was on high amounts of Archie (2) and Epi (.5) to maintain a pressure and HR (HR was in the 140-150's at that time and pressures were 110's/70's- 80's).  He was ventilating on the vent, but was not alert or following any commands.  Pupils were dilated to about an 8 and had sluggish response bilaterally.  Family (Karla, the niece) arrived about 0800 at which time the patient's vitals began to decline again.  At this time, it was decided by the niece and the patient's sister (over the phone) to make him a DNR.  He went asystole at 0810.  
As certified below, I, or a nurse practitioner or physician assistant working with me, had a face-to-face encounter that meets the physician face-to-face encounter requirements.

## 2024-05-18 NOTE — CHART NOTE - NSCHARTNOTEFT_GEN_A_CORE
Diet changed to minced and moist as per Speech/Swallow evaluation
PALLIATIVE MEDICINE INTERDISCIPLINARY TEAM NOTE    Provider:                                      Met with: [ x  ] Patient  [   ] Family  [   ] Other:    Primary Language: [ x  ] English [   ] Other*:                      *Interpretation provided by:    SUPPORT DIAGNOSES            (Check all that apply)    [   ] EOL issues  [ x  ] Advanced Illness  [   ] Cultural / spiritual concerns  [   ] Pain / suffering  [   ] Dementia / AMS  [   ] Other:  [   ] AD issues  [   ] Grief / loss / sadness  [   ] Discharge issues  [  x ] Distress / coping    PSYCHOSOCIAL ASSESSMENT OF PATIENT         (Check all that apply)    [ x  ] Initial Assessment            [   ] Reassessment          [   ] Not Applicable this visit    Pain/suffering acuity:  [  x ] None to mild (0-3)           [   ] Moderate (4-6)        [   ] High (7-10)    Mental Status:  [   ] Alert/oriented (x3)          [ x  ] Confused/Altered(x1)         [   ] Non-resp    Functional status:  [   ] Independent w ADLs      [x   ] Needs Assistance             [   ] Bedbound/Full Care    Coping:  [   ] Coping well                     [   ] Coping w/difficulty            [   ] Poor coping   [ x ] unable to assess     Support system:  [   ] Strong                              [  x ] Adequate                        [   ] Inadequate      Past history and medications for:     [ ] Anxiety       [ ] Depression    [ ] Sleep disorders       SERVICE PROVIDED  [   ]Discharge support / facilitation  [   ]AD / goals of care counseling                                  [   ]EOL / death / bereavement counseling  [   ]Counseling / support                                                [   ] Family meeting  [   ]Prayer / sacrament / ritual                                      [   ] Referral   [  x ]Other: Telehealth/Palliative                                                                        NOTE and Plan of Care (PoC):    patient is a 84 y/o F with pmhx of dementia, COPD, HTN, HLD, presenting with dyspnea and fever, found to have acute respiratory failure from PNA as welkl as COPD exacerbation. visited patient earlier today along with palliative attending via telehealth. patient was awake, however, non verbal. no non verbal signs of pain or discomfort. no family at bedside at time of visit. team will f/u with family [phone]
pt seen and examined,, daughter at bedside    PNA w copd exacerbation  acute hypoxemic rs failure   # recent PNA was DC on PO abx   - c/w methylpred 40mg bid  - abx w cefepime, recent nasal MRSA neg on May/2   - f/u Bcx, wean off O2  - GOCs: dw the daughter, full code for now,
Patient noted to have elevated K   Will order lokelma 10mg x1   repeat BMP @1300
Patient unable to swallow nifedipine, which cannot be crushed. Patient reportedly tolerated nifedipine on prior days but is pocketing the pill currently and will not swallow it.   bp 181/70 HR 65  discussed with Dr. Jacques, will order amlodipine 5mg today instead.

## 2024-05-18 NOTE — DISCHARGE NOTE PROVIDER - NSDCCPCAREPLAN_GEN_ALL_CORE_FT
PRINCIPAL DISCHARGE DIAGNOSIS  Diagnosis: Pneumonia  Assessment and Plan of Treatment: You were seen and treated by the medical team for acute hypoxic respiratory failure secondary to pneumonia. You finished a course of IV antibiotics.   Routine follow up with PCP recommended  If fever, chills, n/v, chest pain, shortness of breath, please report back to the ER.      SECONDARY DISCHARGE DIAGNOSES  Diagnosis: Hypoxia  Assessment and Plan of Treatment: Now sating at baseline, c/w home O2 given hx  of COPD.    Diagnosis: Elevated troponin  Assessment and Plan of Treatment: secondary to demand ischemia    Diagnosis: HTN (hypertension)  Assessment and Plan of Treatment: You reported difficult swallowing nifedipine, nifedipine was discontinued and you were started on Norvasc 10mg once daily. Low Na diet recommended. Continue with monitoring blood pressure, BP daily log, and routine FU with PCP.    Diagnosis: KORI (acute kidney injury)  Assessment and Plan of Treatment: treated w/ IVF, now Cr at baseline.     PRINCIPAL DISCHARGE DIAGNOSIS  Diagnosis: Pneumonia  Assessment and Plan of Treatment: You were seen and treated by the medical team for acute hypoxic respiratory failure secondary to pneumonia. You finished a course of IV antibiotics and steroids.  Routine follow up with PCP recommended  If fever, chills, n/v, chest pain, shortness of breath, please report back to the ER.      SECONDARY DISCHARGE DIAGNOSES  Diagnosis: Hypoxia  Assessment and Plan of Treatment: Now sating at baseline, c/w home O2 given hx  of COPD.    Diagnosis: Elevated troponin  Assessment and Plan of Treatment: secondary to demand ischemia    Diagnosis: HTN (hypertension)  Assessment and Plan of Treatment: You reported difficult swallowing nifedipine, nifedipine was discontinued and you were started on Norvasc 10mg once daily. Low Na diet recommended. Continue with monitoring blood pressure, BP daily log, and routine FU with PCP.    Diagnosis: KORI (acute kidney injury)  Assessment and Plan of Treatment: treated w/ IVF, now Cr at baseline.     PRINCIPAL DISCHARGE DIAGNOSIS  Diagnosis: Pneumonia  Assessment and Plan of Treatment: You were seen and treated by the medical team for acute hypoxic respiratory failure secondary to pneumonia. You finished a course of IV antibiotics and steroids.  Routine follow up with PCP recommended  If fever, chills, n/v, chest pain, shortness of breath, please report back to the ER.  Please follow up with a BMP in 1-3 days post discharge to [facility] your Cr level and potassium lvl      SECONDARY DISCHARGE DIAGNOSES  Diagnosis: Hypoxia  Assessment and Plan of Treatment: Now sating at baseline, c/w home O2 given hx  of COPD.    Diagnosis: Elevated troponin  Assessment and Plan of Treatment: secondary to demand ischemia    Diagnosis: HTN (hypertension)  Assessment and Plan of Treatment: You reported difficult swallowing nifedipine, nifedipine was discontinued and you were started on Norvasc 10mg once daily. Low Na diet recommended. Continue with monitoring blood pressure, BP daily log, and routine FU with PCP.    Diagnosis: KORI (acute kidney injury)  Assessment and Plan of Treatment: treated w/ IVF, now Cr at baseline.

## 2024-05-24 DIAGNOSIS — A41.9 SEPSIS, UNSPECIFIED ORGANISM: ICD-10-CM

## 2024-05-24 DIAGNOSIS — Z99.81 DEPENDENCE ON SUPPLEMENTAL OXYGEN: ICD-10-CM

## 2024-05-24 DIAGNOSIS — J15.69 PNEUMONIA DUE TO OTHER GRAM-NEGATIVE BACTERIA: ICD-10-CM

## 2024-05-24 DIAGNOSIS — N30.90 CYSTITIS, UNSPECIFIED WITHOUT HEMATURIA: ICD-10-CM

## 2024-05-24 DIAGNOSIS — B96.20 UNSPECIFIED ESCHERICHIA COLI [E. COLI] AS THE CAUSE OF DISEASES CLASSIFIED ELSEWHERE: ICD-10-CM

## 2024-05-24 DIAGNOSIS — J96.21 ACUTE AND CHRONIC RESPIRATORY FAILURE WITH HYPOXIA: ICD-10-CM

## 2024-05-24 DIAGNOSIS — E66.9 OBESITY, UNSPECIFIED: ICD-10-CM

## 2024-05-24 DIAGNOSIS — J44.1 CHRONIC OBSTRUCTIVE PULMONARY DISEASE WITH (ACUTE) EXACERBATION: ICD-10-CM

## 2024-05-24 DIAGNOSIS — N18.30 CHRONIC KIDNEY DISEASE, STAGE 3 UNSPECIFIED: ICD-10-CM

## 2024-05-24 DIAGNOSIS — E78.5 HYPERLIPIDEMIA, UNSPECIFIED: ICD-10-CM

## 2024-05-24 DIAGNOSIS — F03.90 UNSPECIFIED DEMENTIA, UNSPECIFIED SEVERITY, WITHOUT BEHAVIORAL DISTURBANCE, PSYCHOTIC DISTURBANCE, MOOD DISTURBANCE, AND ANXIETY: ICD-10-CM

## 2024-05-24 DIAGNOSIS — N17.9 ACUTE KIDNEY FAILURE, UNSPECIFIED: ICD-10-CM

## 2024-05-24 DIAGNOSIS — E11.22 TYPE 2 DIABETES MELLITUS WITH DIABETIC CHRONIC KIDNEY DISEASE: ICD-10-CM

## 2024-05-24 DIAGNOSIS — D84.9 IMMUNODEFICIENCY, UNSPECIFIED: ICD-10-CM

## 2024-06-10 NOTE — ED ADULT NURSE NOTE - CHPI ED NUR SYMPTOMS NEG
no hemoptysis/no diaphoresis/no headache/no fever/no edema/no body aches/no chills/no cough/no wheezing/no chest pain
Yes

## 2024-08-21 NOTE — ED PROVIDER NOTE - CLINICAL SUMMARY MEDICAL DECISION MAKING FREE TEXT BOX
Pt here with labored breathing which started after feeding, concern for possible aspiration pneumonia. Here in ED, satting 88-91% on RA, placed on NRB. BP 80s/60s, rectal temp 97.2. Plan for labs, ekg, imaging r/o COPD exac, sepsis, hypercapnic respiratory failure, ACS, CHF exac, ptx, pneumomediastinum, pneumonia, viral URI, pericarditis, myocarditis, pleural effusion. Tx'ed with duonebs and steroids. Labs notable for WBC 21, BP 80s/60s, lactate >13, K > 10, pH 6.8, pCO2 30. Sepsis suspected at 16:49. Given 30cc/kg IVF of ideal body weight. Ordered broad spectrum abx for septic shock and given hyperK cocktail. Trop 250s, hgb 8.3. Bp 110s/70s on peripheral pressors. Discussed with daughter and granddaughters at length regarding grave prognosis and they are adamant that pt is full code, want pt to be intubated and will reassess in a few days, if no improvement and no chance for recovery, then may revisit code status at that time. Reassessed pt, HR improved with improvement of junctional rhythm as well. Repeat pH improved to 6.95, K 8.6, lactate 17.6, bicarb 6.6. BUN/Cr 169/6.5 up from baseline 26/1.1 on 5/18/24. CT head negative. R IJ CVC placed with placement confirmed on cxr. Ordered additional round of hyperK cocktail. Spoke with renal who said pt is too unstable for HD or CRRT at this time. Once admitted to ICU, they will touch base with ICU when pt is stable enough for CRRT. Initial plan to defer udall as pt was not to be emergently dialyzed however ICU requested udall placement by ED and furthermore pt clinically improving after several rounds of hyperK cocktail so R fem udall placed. Given clinical improvement, intubation also deferred as work of breathing due to respiratory compensation of severe metabolic process rather than primary respiratory process. UOP with 50cc of thick milky urine. ICU accepted admission. Pt requires admission for IV abx given risk for worsening sepsis, bacteremia, septic shock, etc if not closely monitored and tx'ed. Also requires admission for further management of KORI and hyperK given risk for worsening renal failure, life threatening arrhythmia, death, etc if not closely monitored and tx'ed.

## 2024-08-21 NOTE — ED ADULT NURSE NOTE - CHPI ED NUR SYMPTOMS POS
unarousable as per family baseline non verbal; no eye contact patient recently ate and after weak and unarousable/SHORTNESS OF BREATH/WEAKNESS

## 2024-08-21 NOTE — CHART NOTE - NSCHARTNOTEFT_GEN_A_CORE
Call received during weeknight call for emergent RRT.    Noted 84 yo F with history of dementia (nonverbal at baseline, bedbound), DM, HTN, dyslipidemia, COPD, recently admitted for septic shock 2/2 PNA, now sent from home for increased work of breathing, possible aspiration PNA given events began during feeding with caretaker. Was found by EMS to be hypoxic with hypotension, not responding to IV hydration now requiring Levophed infusion. Labwork noted for leukocytosis, high lactate with HAGMA, and brofound hyperkalemia with EKG changes.  Will plan for CRRT given profound acidosis and hemodynamic instability, to be started following udall placement and ICU admission, plan outlined with ED attending and in agreement.  Full note to follow.

## 2024-08-21 NOTE — H&P ADULT - ATTENDING COMMENTS
IMPRESSION:    Septic shock on pressors  UTI  Acute renal failure  Hyperkalemia w/ EKG changes  HAGMA w/ LA  NSTEMI Type 2  COPD on 2L at home  HO ESBL UTI  HO dementia    PLAN:    CNS: avoid sedation    HEENT: oral care    PULMONARY: wean o2 goal 88-92%, aspiration precautions, ABG/CXR noted, Duonebs PRN    CARDIOVASCULAR: C/w hco3 drip 150cc/hr, ECHO, strict i/os, trend Taco, trend EKG    GI: NPO for now, speech when more awake, CT A&P when stable enough    RENAL: Nephro following, on CVVHD, keep net positive balance, c/w bicarb drip, s/p 6 amps of bicarb, lokelma 10mg BID, s/p insulin/dextrose/calcium, may need to give another dose of ca based on EKG, Renal US    INFECTIOUS DISEASE: annelise + vanc, ID consult, bcx, ua, ucx,     HEMATOLOGICAL:  DVT prophylaxis. 2 large bore IVs, keep T&S active, give 1 unit pRBC, DIC panel    ENDOCRINE:  Follow up FS.  Insulin protocol if needed. repeat FS, may need insulin drip    MUSCULOSKELETAL: bedrest    Grave prognosis   DNR/DNI  Palliative consult    MICU 83 yoF bedbound with a medical history of dementia, decubiti ulcer,  HTN, HLD and DM was brought to the hospital  for Altered mental status.     acute renal failure with hyperkalemia / sepsis (POA) / metabolic acidosis      - now on pressors and CVVH   - family at bedside requesting DNR and DNI   - considering comfort measures   - antibiotics   - Grave prognosis    - Palliative consult

## 2024-08-21 NOTE — PATIENT PROFILE ADULT - FALL HARM RISK - HARM RISK INTERVENTIONS

## 2024-08-21 NOTE — ED PROVIDER NOTE - ATTENDING APP SHARED VISIT CONTRIBUTION OF CARE
84 yo F with hx of COPD on 2L home O2, dementia, depression, DM, HTN, HLD who was BIBEMS from home for increased work of breathing which started around 2:30pm today. Per daughter, pt is nonverbal and nonmobile at baseline. She was being fed by her HHA who she has 12 hrs/day and typically holds food in her mouth so needs to be constantly encouraged to swallow her food. Around 2:30pm pt was noted to have labored breathing so they called EMS. When EMS arrived, pt was satting ~88% on her 2L home O2 and initial BP was 130s/80s. FS 130s. After they carried her downstairs, her BP dropped to 80s/60s so given 250cc IVF en route to ED. Daughter says that other than work of breathing, pt is at her baseline which is minimally interactive. No fever, cough, vomiting, diarrhea. Pt has pressure ulcer for which pt is being turned frequently. No sick contact. Daughter confirms pt is full code. Per chart review, pt was admitted 3 mo ago for sepsis 2/2 pneumonia and UTI.    PMD Dr. Sena    CONSTITUTIONAL: chronically ill appearing, toxic appearing, nonverbal  SKIN: warm, dry, stage 3 sacral ulcer without discharge or significant erythema/induration, cap refill < 2 seconds  HEENT: normocephalic, atraumatic, no conjunctival erythema, moist mucous membranes, patent airway  NECK: supple  CV:  regular rate, regular rhythm, 2+ radial pulses bilaterally  RESP: no wheezes, no rales, no rhonchi, accessory muscle use  ABD: soft, no tenderness, nondistended, no rebound, no guarding  MSK: no cyanosis, no edema  NEURO: awake, does not track  PSYCH: unable to assess    A&P:  Pt here with labored breathing which started after feeding, concern for possible aspiration pneumonia. Here in ED, satting 88-91% on RA, placed on NRB. BP 80s/60s, rectal temp 97.2. Plan for labs, ekg, imaging r/o COPD exac, sepsis, hypercapnic respiratory failure, ACS, CHF exac, ptx, pneumomediastinum, pneumonia, viral URI, pericarditis, myocarditis, pleural effusion. Will tx with duonebs and steroids. I personally saw the patient. EBER Lucas and I provided critical care for a total of 200 minutes. I provided a substantive portion of the care and the majority of the critical care time.    84 yo F with hx of COPD on 2L home O2, dementia, depression, DM, HTN, HLD who was BIBEMS from home for increased work of breathing which started around 2:30pm today. Per daughter, pt is nonverbal and nonmobile at baseline. She was being fed by her HHA who she has 12 hrs/day and typically holds food in her mouth so needs to be constantly encouraged to swallow her food. Around 2:30pm pt was noted to have labored breathing so they called EMS. When EMS arrived, pt was satting ~88% on her 2L home O2 and initial BP was 130s/80s. FS 130s. After they carried her downstairs, her BP dropped to 80s/60s so given 250cc IVF en route to ED. Daughter says that other than work of breathing, pt is at her baseline which is minimally interactive. No fever, cough, vomiting, diarrhea. Pt has pressure ulcer for which pt is being turned frequently. No sick contact. Daughter confirms pt is full code. Per chart review, pt was admitted 3 mo ago for sepsis 2/2 pneumonia and UTI.    PMD Dr. Sena    CONSTITUTIONAL: chronically ill appearing, toxic appearing, nonverbal  SKIN: warm, dry, stage 3 sacral ulcer without discharge or significant erythema/induration, cap refill < 2 seconds  HEENT: normocephalic, atraumatic, no conjunctival erythema, moist mucous membranes, patent airway  NECK: supple  CV:  regular rate, regular rhythm, 2+ radial pulses bilaterally  RESP: no wheezes, no rales, no rhonchi, accessory muscle use  ABD: soft, no tenderness, nondistended, no rebound, no guarding  MSK: no cyanosis, no edema  NEURO: awake, does not track  PSYCH: unable to assess    A&P:  Pt here with labored breathing which started after feeding, concern for possible aspiration pneumonia. Here in ED, satting 88-91% on RA, placed on NRB. BP 80s/60s, rectal temp 97.2. Plan for labs, ekg, imaging r/o COPD exac, sepsis, hypercapnic respiratory failure, ACS, CHF exac, ptx, pneumomediastinum, pneumonia, viral URI, pericarditis, myocarditis, pleural effusion. Will tx with duonebs and steroids.

## 2024-08-21 NOTE — ED PROVIDER NOTE - SECONDARY DIAGNOSIS.
Anemia Elevated troponin level Lactic acidosis Metabolic acidosis Hyperkalemia Acute UTI Acute renal failure

## 2024-08-21 NOTE — H&P ADULT - NSHPLABSRESULTS_GEN_ALL_CORE
LABS:                          8.3    21.75 )-----------( 636      ( 21 Aug 2024 16:30 )             30.6         151<H>  |  115<H>  |  169<HH>  ----------------------------<  125<H>  10.0<HH>   |  <2<LL>  |  6.5<HH>    Ca    9.9      21 Aug 2024 16:30    TPro  6.4  /  Alb  3.3<L>  /  TBili  0.2  /  DBili  x   /  AST  22  /  ALT  15  /  AlkPhos  95      LIVER FUNCTIONS - ( 21 Aug 2024 16:30 )  Alb: 3.3 g/dL / Pro: 6.4 g/dL / ALK PHOS: 95 U/L / ALT: 15 U/L / AST: 22 U/L / GGT: x           PT/INR - ( 21 Aug 2024 16:30 )   PT: 15.40 sec;   INR: 1.35 ratio         PTT - ( 21 Aug 2024 16:30 )  PTT:TNP sec  Urinalysis Basic - ( 21 Aug 2024 16:30 )    Color: Dark Yellow / Appearance: Turbid / S.015 / pH: x  Gluc: 125 mg/dL / Ketone: Negative mg/dL  / Bili: Negative / Urobili: 0.2 mg/dL   Blood: x / Protein: 100 mg/dL / Nitrite: Negative   Leuk Esterase: Large / RBC: Too Numerous to count /HPF / WBC Too Numerous to count /HPF   Sq Epi: x / Non Sq Epi: x / Bacteria: Many /HPF      < from: CT Head No Cont (24 @ 17:23) >      IMPRESSION:    No evidence of acute intracranial pathology.    No evidence of acute intracranial hemorrhage or acute territorial infarct.    No evidence of mass or midline shift.    < end of copied text >

## 2024-08-21 NOTE — ED PROCEDURE NOTE - NS ED ATTENDING STATEMENT MOD
This was a shared visit with the LEEANNE. I reviewed and verified the documentation.
This was a shared visit with the LEEANNE. I reviewed and verified the documentation.

## 2024-08-21 NOTE — ED ADULT NURSE NOTE - NSFALLRISKINTERV_ED_ALL_ED

## 2024-08-21 NOTE — ED PROVIDER NOTE - CARE PLAN
Principal Discharge DX:	Septic shock  Secondary Diagnosis:	Metabolic acidosis  Secondary Diagnosis:	Hyperkalemia  Secondary Diagnosis:	Anemia  Secondary Diagnosis:	Lactic acidosis  Secondary Diagnosis:	Elevated troponin level   1 Principal Discharge DX:	Septic shock  Secondary Diagnosis:	Metabolic acidosis  Secondary Diagnosis:	Hyperkalemia  Secondary Diagnosis:	Anemia  Secondary Diagnosis:	Lactic acidosis  Secondary Diagnosis:	Elevated troponin level  Secondary Diagnosis:	Acute renal failure

## 2024-08-21 NOTE — ED PROVIDER NOTE - OBJECTIVE STATEMENT
Patient is an 83 year old female with pmhx of htn, hld, dm, copd on 2L NC baseline, dementia nonverbal baseline presents with family for evaluation of shortness of breath and was BIBEMS after being found hypoxic on RA and hypotensive. Patient unable to endorse complaints. Family did not note any other specific symptoms. Per chart review, patient admitted recently for PNA/UTI ifx. She was also noted to be eating at the time of the shortness of breath episode. They deny any other complaints. Family stated they wish patient to be full code.

## 2024-08-21 NOTE — ED PROCEDURE NOTE - ATTENDING APP SHARED VISIT CONTRIBUTION OF CARE
I was present for and supervised the key/critical aspects of the procedures performed during the care of the patient.
I was present for and supervised the key/critical aspects of the procedures (UDALL PLACEMENT) performed during the care of the patient.

## 2024-08-21 NOTE — ED PROCEDURE NOTE - CPROC ED INFUS LINE DETAIL1
The location was identified, and the area was draped and prepped./The catheter was placed using sterile technique./Ultrasound guidance was used./The guidewire was recovered./All lumen(s) aspirated and flushed without difficulty.
The location was identified, and the area was draped and prepped./The catheter was placed using sterile technique./Ultrasound guidance was used./The guidewire was recovered./All lumen(s) aspirated and flushed without difficulty.

## 2024-08-21 NOTE — H&P ADULT - NSHPPHYSICALEXAM_GEN_ALL_CORE
ICU Vital Signs Last 24 Hrs  T(C): 36.2 (21 Aug 2024 16:04), Max: 36.3 (21 Aug 2024 15:52)  T(F): 97.2 (21 Aug 2024 16:04), Max: 97.3 (21 Aug 2024 15:52)  HR: 91 (21 Aug 2024 20:41) (60 - 91)  BP: 131/55 (21 Aug 2024 20:41) (82/38 - 132/60)  BP(mean): --  ABP: --  ABP(mean): --  RR: 20 (21 Aug 2024 20:41) (20 - 20)  SpO2: 99% (21 Aug 2024 20:41) (99% - 100%)    O2 Parameters below as of 21 Aug 2024 20:41  Patient On (Oxygen Delivery Method): mask, nonrebreather  O2 Flow (L/min): 10      PHYSICAL EXAM:    Constitutional: Awake, lethargic. Unable to follow commands    Eyes: PEERL, EOM intact b/l.     Neck: Supple, non-tender, trachea midline.     Respiratory: CTA b/l. No rhonchi/ rales/ wheeze.     Cardiovascular: S1/ S2 present and clear, no murmur, gallops, or rub.     Gastrointestinal: Abd soft + non-tender. BS NA x 4 quadrants.     Extremities: No edema, clubbing, or cyanosis in Ext x 4.     Vascular: DP + radial Pulse 2+ b/l.     Skin: No rash/ focal lesions.     Neuro: Awake, unable to follow commands. PEERL b/l

## 2024-08-21 NOTE — ED PROVIDER NOTE - DIFFERENTIAL DIAGNOSIS
Differential Diagnosis COPD exac, sepsis, hypercapnic respiratory failure, ACS, CHF exac, ptx, pneumomediastinum, pneumonia, viral URI, pericarditis, myocarditis, pleural effusion

## 2024-08-21 NOTE — H&P ADULT - ASSESSMENT
83 yoF PMH HTN/ HLD/ DM. BIBEMS for Altered mental status.   noted to have severe hyperkalemia/ renal failure/ metabolic acidosis.   Given multiple doses Sodium bicarbonate, D50, insulin, and CaGlu.   Uldall + central line placed. Evaluated by Nephro, to be started on CVVH.   VSS at this time.     #Sepsis 2/2 UTI- Previous UTI with ESBL   S/p Vanco + Cefepime, convert to Meropenem   Trend procal/ Lactate/ WBC count  UCx/BCx   Infectious disease eval     #Acute Renal Failure- Requiring CVVH   #[facility]   Nephrology on board   Severe acidosis on admission ABG   Trend blood gas   S/p 6 amps bicarb push   Bicarb gtt at 150cc/hr     Aspiration Precautions   DVT PPX   Dispo: Admit to Critical Care Service     Adolfo VELÁZQUEZ  83 yoF PMH HTN/ HLD/ DM. BIBEMS for Altered mental status.   noted to have severe hyperkalemia/ renal failure/ metabolic acidosis.   Given multiple doses Sodium bicarbonate, D50, insulin, and CaGlu.   Uldall + central line placed. Evaluated by Nephro, to be started on CVVH.   VSS at this time.     #Sepsis 2/2 UTI- Previous UTI with ESBL   S/p Vanco + Cefepime, convert to Meropenem   Trend procal/ Lactate/ WBC count  UCx/BCx   Infectious disease eval     #Acute Renal Failure- Requiring CVVH   #HAGMA   #Hyperkalemia   Nephrology on board   Severe acidosis on admission ABG   Trend blood gas   S/p 6 amps bicarb push   Bicarb gtt at 150cc/hr   Trend BMP/ Mg     Aspiration Precautions   DVT PPX   Dispo: Admit to Critical Care Service     Adolfo VELÁZQUEZ  83 yoF PMH HTN/ HLD/ DM. BIBEMS for Altered mental status.   noted to have severe hyperkalemia/ renal failure/ metabolic acidosis.   Given multiple doses Sodium bicarbonate, D50, insulin, and CaGlu.   Uldall + central line placed. Evaluated by Nephro, to be started on CVVH.   VSS at this time.     #Septic shock 2/2 UTI- Previous UTI with ESBL   S/p Vanco + Cefepime, convert to Meropenem   Trend procal/ Lactate/ WBC count  UCx/BCx   Infectious disease eval     #Acute Renal Failure- Requiring CVVH   #HAGMA   #Hyperkalemia   Nephrology on board   Severe acidosis on admission ABG   Trend blood gas   S/p 6 amps bicarb push   Bicarb gtt at 150cc/hr   Trend BMP/ Mg   CT A&P when stable    Aspiration Precautions     # HO Dementia    DVT PPX   Dispo: Admit to Critical Care Service     Adolfo VELÁZQUEZ

## 2024-08-21 NOTE — ED PROVIDER NOTE - PROGRESS NOTE ADDITIONAL1
I will SWITCH the dose or number of times a day I take the medications listed below when I get home from the hospital:  None Additional Progress Note...

## 2024-08-21 NOTE — ED PROVIDER NOTE - PROGRESS NOTE DETAILS
TC: So far labs notable for WBC 21, BP 80s/60s, lactate >13, K > 10, pH 6.8, pCO2 30. Sepsis suspected at 16:49. Given 30cc/kg IVF of ideal body weight. Ordered broad spectrum abx for septic shock and given hyperK cocktail. Trop 250s, hgb 8.3. Bp 110s/70s on peripheral pressors. Discussed with daughter and granddaughters at length regarding grave prognosis and they are adamant that pt is full code, want pt to be intubated and will reassess in a few days, if no improvement and no chance for recovery, then may revisit code status at that time. TC: Reassessed pt, HR improved with improvement of junctional rhythm as well. Repeat pH improved to 6.95, K 8.6, lactate 17.6, bicarb 6.6. BUN/Cr 169/6.5 up from baseline 26/1.1 on 5/18/24. CT head negative. R IJ CVC placed with placement confirmed on cxr. Ordered additional round of hyperK cocktail. Spoke with renal who said pt is too unstable for HD or CRRT at this time. Once admitted to ICU, they will touch base with ICU when pt is stable enough for CRRT. Will defer udall at this time as pt will not be emergently dialyzed. KA - Patient S/P CVC and UDOL placement. Discussed results and care with family and brought them bedside. Discussed care with ICU Dr Bassett. Him and ICU PA evaluated patient bedside. Accepted patient to ICU. Will go to CRRT via transport.

## 2024-08-21 NOTE — ED PROVIDER NOTE - PHYSICAL EXAMINATION
As Follows:  CONST: Ill appearing, acutely distressed.   EYES: PERRL, Sclera and conjunctiva clear.   CARD: No murmurs, rubs, or gallops; Tachycardic.   RESP: BS present B/L, No focal wheezes, rhonchi or rales. Hypoxic to high 80s RA. Improved to 98 on nonrebreather,   GI: Soft, non-tender, non-distended.  SKIN: Sacral ulceration. Warm, dry, no acute rashes. MMM  NEURO: Nonverbal at baseline.

## 2024-08-21 NOTE — ED PROCEDURE NOTE - CPROC ED INDICATIONS1
CRRT/emergency venous access/hemodynamic monitoring/volume resuscitation CRRT - udall placement/emergency venous access/hemodynamic monitoring/volume resuscitation

## 2024-08-22 ENCOUNTER — RESULT REVIEW (OUTPATIENT)
Age: 84
End: 2024-08-22

## 2024-08-22 NOTE — PHARMACOTHERAPY INTERVENTION NOTE - COMMENTS
Recommended a one time dose of gentamicin 160mg based on her adjusted body weight of 53.6kg and the loading dose of 3mg/kg since patient is on CVVH.
Recommended increasing meropenem dose to 1g IV q8h since patient is on CVVH.

## 2024-08-22 NOTE — CONSULT NOTE ADULT - ASSESSMENT
ID is consulted for UTI  Hypothermic on admission, WBC 21.75 > 22.71  Hypotensive on pressor  On NRB  Severe KORI and metabolic acidosis  Lactate >16  BCx pending  UA with pyuria, UCx pending  Previous UCx 3/2024 ESBL E. coli    CXR stable left base opacity    Antibiotics:  Vancomycin 8/21  Cefepime 8/21  Levaquin 8/21  Meropenem 8/22 ->      IMPRESSION:  Acute pyelonephritis  Possible LLL pneumonia, suspect gram negative organism  Septic shock  Acute hypoxemic respiratory failure  Acute renal failure  Transaminitis  Lactic acidosis  Immunosuppression / Immunosenescence secondary to multiple comorbidities which could result in poor clinical outcome    RECOMMENDATIONS:  - Increase IV meropenem to 1g q8hrs while patient's on CVVHD; if she is off, decrease meropenem dose back to 1g q24hrs  - Add IV gentamicin 160mg x 1 for GNR infection  - Follow up BCx and UCx  - Trend lactate  - Nephro follow up  - Offloading and frequent position changes, aspiration precaution  - Trend WBC, fever curve, transaminases, creatinine daily  - Extremely poor prognosis      Gladys Finney D.O.  Attending Physician  Division of Infectious Diseases  [facility]  Please contact me via [employer] Teams

## 2024-08-22 NOTE — CONSULT NOTE ADULT - ASSESSMENT
83yFemale with history of HTN, HLD, DM, COPD on 2L, dementia nonverbal at baseline presents with SOB.  Patient with septic shock on arrival and KORI requiring CVVHD and pressors. Palliative care consulted for GOC.    Patient's family are considering comfort measures only, but are awaiting for patient's grandson to arrive from out of two to make that decision later this evening.  All questions answered.  Will leave recs if patient's family decides to pursue comfort measures only tonight.    #Septic shock  #Severe HAGMA  #Lactic Acidosis  #Uremia/KORI requiring CVVHD  #Advanced dementia    Recommendations  -DNR/DNI  -ongoing medical management  -management of vent and pressors per ICU  -management of CVVHD per renal  -family considering comfort measures only later tonight  -all questions answered  -will follow      MEDD (morphine equivalent daily dose):    Education about palliative care provided to patient/family.  See Recs below.    Please call [phone] with questions or concerns 24/7.   We will continue to follow.       ------------  IF patient's family decides to pursue comfort measures only tonight, then recommendations as follows:  -MOLST will need to be filled out and placed in chart by primary team if family wishes for CMO  -No additional IVF, artificial nutrition, blood draws, dialysis, vital signs, pressors, antibiotics, escalation of oxygen, escalation of care  -comfort measures only with NRB removal  -recommend dilaudid 0.5mg IV once and ativan 0.5mg IV once 10 minutes prior to NRB removal  -recommend dilaudid 0.5mg IV q15min PRN for pain/dyspnea following NRB removal  -recommend ativan 0.5mg IV q30min PRN for anxiety/agitation  -recommend glycopyrrolate 0.2mg IV q6h PRN for secretions  -discussed with on call physician  -[phone] PRN

## 2024-08-22 NOTE — CONSULT NOTE ADULT - ASSESSMENT
IMPRESSION:  83 Y o female with     #Toxic metabolic encephalopathy - CTH negative   #septic shock   # UTI Hx of ESBL   # severe HAGMA  #Lactic acidosis >16  #uremia   #shock liver  # severe Anemia   # Hyperkalemia  #hypernatremia   #hyperglycemia? DKA   #Elevated troponin/ NSTEMI         PLAN:    CNS: Avoid sedatives, c/w treatment of sepsis with MOD     HEENT: Oral care    PULMONARY:  HOB @ 45 degrees.  Aspiration precautions, Hyperventilating appropriately, DNI     CARDIOVASCULAR: continue pressors, CVVHD. Keep MAP >65, Echo     GI: Holding feeds for now, until patient is more awake, shock liver noted, hepatitis panel, and RUQ sono, hold off CT chest abdomen and pelvis due to unstable     RENAL:  Severe electrolyte derangements with HAGMA noted, c/w CVVHD, BMP q4, bicarb drip    INFECTIOUS DISEASE: Follow up cultures, c/w Meropenem    HEMATOLOGICAL:  DVT prophylaxis, blood transfusion for hgb <7    ENDOCRINE: Insulin drip, IV fluids  FS q1h, follow the ketones, consider renal failure, add D5, get A1c     MUSCULOSKELETAL: Bed rest     ICU   Needs extensive goals of care          IMPRESSION:  83 Y o female with     #Toxic metabolic encephalopathy - CTH negative   #septic shock   # UTI Hx of ESBL   # severe HAGMA  #Lactic acidosis >16  #uremia . KORI  #shock liver  # severe Anemia   # Hyperkalemia  #hypernatremia   #hyperglycemia? DKA   #Elevated troponin/ NSTEMI         PLAN:    CNS: Avoid sedatives, c/w treatment of sepsis with MOD     HEENT: Oral care    PULMONARY:  HOB @ 45 degrees.  Aspiration precautions, Hyperventilating appropriately, DNI   do ABG   CARDIOVASCULAR: continue pressors, CVVHD. Keep MAP >65, Echo   continue Bicarb drip   follow CE   cardiology eval     GI: Holding feeds for now, until patient is more awake, shock liver noted, hepatitis panel, and RUQ sono, hold off CT chest abdomen and pelvis due to unstable     RENAL:  Severe electrolyte derangements with HAGMA noted, c/w CVVHD, BMP q4, bicarb drip  renal and bladder US   frenal consult   follow lytes   INFECTIOUS DISEASE: Follow up cultures, c/w Meropenem    HEMATOLOGICAL:  DVT prophylaxis, blood transfusion for hgb <7    ENDOCRINE: Insulin drip, IV fluids  FS q1h, follow the ketones, consider renal failure, add D5, get A1c     MUSCULOSKELETAL: Bed rest     ICU   Needs extensive goals of care          IMPRESSION:  83 Y o female with     #Toxic metabolic encephalopathy - CTH negative   #septic shock   # UTI Hx of ESBL   metformin toxicity ??   # severe HAGMA  #Lactic acidosis >16  #uremia . KORI  #shock liver  # severe Anemia   # Hyperkalemia  #hypernatremia   #hyperglycemia? DKA   #Elevated troponin/ NSTEMI         PLAN:    CNS: Avoid sedatives, c/w treatment of sepsis with MOD     HEENT: Oral care    PULMONARY:  HOB @ 45 degrees.  Aspiration precautions, Hyperventilating appropriately, DNI   do ABG   cxr   CARDIOVASCULAR: continue pressors, CVVHD. Keep MAP >65, Echo   continue Bicarb drip   follow CE   cardiology eval   check LA   GI: Holding feeds for now, until patient is more awake, shock liver noted, hepatitis panel, and RUQ sono, hold off CT chest abdomen and pelvis due to unstable   do KUB   RENAL:  Severe electrolyte derangements with HAGMA noted, c/w CVVHD, BMP q4, bicarb drip  renal and bladder US   renal consult   follow lytes   check for osmolar gap send serum osmolarity     INFECTIOUS DISEASE: Follow up cultures, c/w Meropenem    HEMATOLOGICAL:  DVT prophylaxis, blood transfusion for hgb <7  heparin drip follow h/h closely   ENDOCRINE: Insulin drip, IV fluids  FS q1h, follow the ketones, consider renal failure, add D5, get A1c     MUSCULOSKELETAL: Bed rest     ICU   Needs extensive goals of care     very poor prognosis

## 2024-08-22 NOTE — CONSULT NOTE ADULT - ATTENDING COMMENTS
patient seen and examined agree above note   CVVH follow renal ?? switch to HD on minimal pressors   renal consult   follow K BMP Q4 hrs   continue bicarb drip   continue abx   renal bladder US   MICU   very poor prognosis   DNR/DNI patient seen and examined agree above note   CVVH follow renal ?? switch to HD on minimal pressors   renal consult   elevated LA doubt ischemic bowel but still in diff diagnosis   not stable for ct scan   do KUB   follow K BMP Q4 hrs   continue bicarb drip   continue abx   renal bladder US   MICU   very poor prognosis   DNR/DNI

## 2024-08-22 NOTE — CONSULT NOTE ADULT - CONVERSATION DETAILS
Spoke with patient's daughter and granddaughters outside patient's room. Palliative care introduced. They were able to provide a medical history and hospital course.  Discussed the patient's critical illness, need for CVVHD, and need for pressors, and discussed her overall grave prognosis.  Discussed options, including ongoing medical management and comfort measures only. Family noted the patient's quality of life was poor in the setting of dementia prior to her critical illness.    They noted they would likely wish to pursue comfort measures only, but the patient's grandson is coming from out of town this evening. They will make a decision at that time.  All questions answered.

## 2024-08-22 NOTE — CONSULT NOTE ADULT - SUBJECTIVE AND OBJECTIVE BOX
CC:  AMS    HPI:  83 yoF PMH Dementia/HTN/ HLD/ DM. BIBEMS for Altered mental status. Patient with septic shock and KORI on arrival requiring CVVH.  Palliative care consulted for GOC.     (21 Aug 2024 20:50)    PERTINENT PM/SXH:   COPD (chronic obstructive pulmonary disease)    Diabetes mellitus, type 2    High blood cholesterol    Essential hypertension    Dementia    Depression      No significant past surgical history      FAMILY HISTORY:    None pertinent    ITEMS NOT CHECKED ARE NOT PRESENT    SOCIAL HISTORY:   Significant other/partner[ ]  Children[ ]  Presybeterian/Spirituality:  Substance hx:  [ ]   Tobacco hx:  [ ]   Alcohol hx: [ ]   Living Situation: [ x]Home  [ ]Long term care  [ ]Rehab [ ]Other  Home Services: [ ] HHA [ ] Visting RN [ ] Hospice  Occupation:  Home Opioid hx:  [ ] Y [ ] N [x ] I-Stop Reference No:    Reference #: [id number] - no meds     ADVANCE DIRECTIVES:     [ ] Full Code [ x] DNR  MOLST  [ ]  Living Will  [ ]   DECISION MAKER(s):  [ ] Health Care Proxy(s)  [ x] Surrogate(s)  [ ] Guardian           Name(s): Phone Number(s):  Ines Latrice      BASELINE (I)ADL(s) (prior to admission):    [place]: [ ]Total  [ ] Moderate [ ]Dependent  Palliative Performance Status Version 2:         %    http://npcrc.org/files/news/palliative_performance_scale_ppsv2.pdf    Allergies    No Known Allergies    Intolerances    MEDICATIONS  (STANDING):  chlorhexidine 2% Cloths 1 Application(s) Topical <User Schedule>  CRRT Treatment    <Continuous>  dextrose 50% Injectable 50 milliLiter(s) IV Push every 15 minutes  dextrose 50% Injectable 50 milliLiter(s) IV Push once  heparin  Infusion. 400 Unit(s)/Hr (4 mL/Hr) IV Continuous <Continuous>  insulin regular Infusion 6 Unit(s)/Hr (6 mL/Hr) IV Continuous <Continuous>  memantine 10 milliGRAM(s) Oral two times a day  norepinephrine Infusion 0.05 MICROgram(s)/kG/Min (2.92 mL/Hr) IV Continuous <Continuous>  pantoprazole  Injectable 40 milliGRAM(s) IV Push daily  PARoxetine 30 milliGRAM(s) Oral daily  PureFlow Dialysate RFP-400 (K 2 / Ca 3) 5000 milliLiter(s) (2000 mL/Hr) CRRT <Continuous>  rosuvastatin 10 milliGRAM(s) Oral at bedtime  sodium bicarbonate  Infusion 0.708 mEq/kG/Hr (150 mL/Hr) IV Continuous <Continuous>    MEDICATIONS  (PRN):  albuterol/ipratropium for Nebulization 3 milliLiter(s) Nebulizer every 6 hours PRN Shortness of Breath and/or Wheezing  sodium chloride 0.9% lock flush 10 milliLiter(s) IV Push every 1 hour PRN Pre/post blood products, medications, blood draw, and to maintain line patency    PRESENT SYMPTOMS: [ ]Unable to obtain due to poor mentation   Source if other than patient:  [ ]Family   [ ]Team     Pain: [ ]yes [ ]no  ALL PAIN ASSESSMENT COMPONENTS WERE ASKED ABOUT UNLESS OTHERWISE NOTED  QOL impact -   Location -                    Aggravating factors -  Quality -  Radiation -  Timing-  Severity (0-10 scale):  Minimal acceptable level (0-10 scale):     CPOT:    https://www.[facility].org/getattachment/[email or web address]/Critical-Care-Pain-Observation-Tool-(CPOT)    PAIN AD Score:   http://geriatrictoolkit.[place].[facility]/cog/painad.pdf (press ctrl +  left click to view)    Dyspnea:                           [ ]None[ ]Mild [ ]Moderate [ ]Severe     Respiratory Distress Observation Scale (RDOS):   A score of 0 to 2 signifies little or no respiratory distress, 3 signifies mild distress, scores 4 to 6 indicate moderate distress, and scores greater than 7 signify severe distress  https://www.[facility].ca/sites/default/files/PDFS/288252-tlptmrfgdhl-cogzxfmq-vqfbtshcorq-edqff.pdf    Anxiety:                             [ ]None[ ]Mild [ ]Moderate [ ]Severe   Fatigue:                             [ ]None[ ]Mild [ ]Moderate [ ]Severe   Nausea:                             [ ]None[ ]Mild [ ]Moderate [ ]Severe   Loss of appetite:              [ ]None[ ]Mild [ ]Moderate [ ]Severe   Constipation:                    [ ]None[ ]Mild [ ]Moderate [ ]Severe    Other Symptoms:  [ ]All other review of systems negative     Palliative Performance Status Version 2:         %    http://npcrc.org/files/news/palliative_performance_scale_ppsv2.pdf    PHYSICAL EXAM:  Vital Signs Last 24 Hrs  T(C): 33.6 (22 Aug 2024 13:00), Max: 36.3 (21 Aug 2024 15:52)  T(F): 92.5 (22 Aug 2024 13:00), Max: 97.3 (21 Aug 2024 15:52)  HR: 90 (22 Aug 2024 13:00) (60 - 109)  BP: 135/60 (22 Aug 2024 13:00) (82/38 - 142/63)  BP(mean): 87 (22 Aug 2024 13:00) (65 - 90)  RR: 19 (22 Aug 2024 13:00) (15 - 20)  SpO2: 99% (22 Aug 2024 13:00) (89% - 100%)    Parameters below as of 22 Aug 2024 07:00  Patient On (Oxygen Delivery Method): mask, nonrebreather  O2 Flow (L/min): 10   I&O's Summary    21 Aug 2024 07:01  -  22 Aug 2024 07:00  --------------------------------------------------------  IN: 3927 mL / OUT: 90 mL / NET: 3837 mL    22 Aug 2024 07:01  -  22 Aug 2024 13:32  --------------------------------------------------------  IN: 1438.8 mL / OUT: 1325 mL / NET: 113.8 mL        GENERAL:  [ ] No acute distress [ ]Lethargic  [ ]Unarousable  [ ]Verbal  [ ]Non-Verbal [ ]Cachexia    BEHAVIORAL/PSYCH:  [ ]Alert and Oriented x  [ ] Anxiety [ ] Delirium [ ] Agitation [ ] Calm   EYES: [ ] No scleral icterus [ ] Scleral icterus [ ] Closed  ENMT:  [ ]Dry mouth  [ ]No external oral lesions [ ] No external ear or nose lesions  CARDIOVASCULAR:  [ ]Regular [ ]Irregular [ ]Tachy [ ]Not Tachy  [ ]Abdirahman [ ] Edema [ ] No edema  PULMONARY:  [ ]Tachypnea  [ ]Audible excessive secretions [ ] No labored breathing [ ] labored breathing  GASTROINTESTINAL: [ ]Soft  [ ]Distended  [ ]Not distended [ ]Non tender [ ]Tender  MUSCULOSKELETAL: [ ]No clubbing [ ] clubbing  [ ] No cyanosis [ ] cyanosis  NEUROLOGIC: [ ]No focal deficits  [ ]Follows commands  [ ]Does not follow commands  [ ]Cognitive impairment  [ ]Dysphagia  [ ]Dysarthria  [ ]Paresis   SKIN: [ ] Jaundiced [ ] Non-jaundiced [ ]Rash [ ]No Rash [ ] Warm [ ] Dry  MISC/LINES: [ ] ET tube [ ] Trach [ ]NGT/OGT [ ]PEG [ ]Shah    LABS: reviewed by me                        10.0   14.85 )-----------( 502      ( 22 Aug 2024 12:11 )             32.8   08-22    156<H>  |  101  |  94<HH>  ----------------------------<  70  4.7   |  11<L>  |  3.3<H>    Ca    9.0      22 Aug 2024 12:11  Phos  8.0     08-22  Mg     2.0     08-22    TPro  4.6<L>  /  Alb  2.4<L>  /  TBili  0.2  /  DBili  <0.2  /  AST  353<H>  /  ALT  254<H>  /  AlkPhos  80  08-21  PT/INR - ( 22 Aug 2024 07:30 )   PT: 18.50 sec;   INR: 1.61 ratio         PTT - ( 22 Aug 2024 12:11 )  PTT:175.4 sec    Urinalysis Basic - ( 22 Aug 2024 12:11 )    Color: x / Appearance: x / SG: x / pH: x  Gluc: 70 mg/dL / Ketone: x  / Bili: x / Urobili: x   Blood: x / Protein: x / Nitrite: x   Leuk Esterase: x / RBC: x / WBC x   Sq Epi: x / Non Sq Epi: x / Bacteria: x      RADIOLOGY & ADDITIONAL STUDIES: reviewed by me    EKG: reviewed by me      PROTEIN CALORIE MALNUTRITION PRESENT: [ ]mild [ ]moderate [ ]severe [ ]underweight [ ]morbid obesity  https://www.andeal.org/vault/2440/web/files/ONC/Table_Clinical%20Characteristics%20to%20Document%20Malnutrition-White%20JV%20et%20al%202012.pdf    Height (cm): 154.9 (08-21-24 @ 22:04), 154.9 (05-13-24 @ 14:25), 157.5 (05-01-24 @ 21:07)  Weight (kg): 62.3 (08-21-24 @ 22:04), 79.1 (05-13-24 @ 14:25), 76 (05-01-24 @ 21:07)  BMI (kg/m2): 26 (08-21-24 @ 22:04), 33 (05-13-24 @ 14:25), 30.6 (05-01-24 @ 21:07)  [ ]PPSV2 < or = to 30% [ ]significant weight loss  [ ]poor nutritional intake  [ ]anasarca      [ ]Artificial Nutrition      Palliative Care Spiritual/Emotional Screening Tool Question  Severity (0-4):                    OR                    [ x] Unable to determine. Will assess at later time if appropriate.  Score of 2 or greater indicates recommendation of Chaplaincy and/or SW referral  Chaplaincy Referral: [ ] Yes [ ] Refused [ ] Following     Caregiver [place]:  [ ] Yes [ ] No    OR    [x ] Unable to determine. Will assess at later time if appropriate.  Social Work Referral [ ]  Patient and Family Centered Care Referral [ ]    Anticipatory Grief Present: [ ] Yes [ ] No    OR     [ x] Unable to determine. Will assess at later time if appropriate.  Social Work Referral [ ]  Patient and Family Centered Care Referral [ ]    Patient discussed with primary medical team MD  Palliative care education provided to patient and/or family   CC:  AMS    HPI:  83 yoF PMH Dementia/HTN/ HLD/ DM. BIBEMS for Altered mental status. Patient with septic shock and KORI on arrival requiring CVVH.  Palliative care consulted for GOC.     (21 Aug 2024 20:50)    PERTINENT PM/SXH:   COPD (chronic obstructive pulmonary disease)    Diabetes mellitus, type 2    High blood cholesterol    Essential hypertension    Dementia    Depression      No significant past surgical history      FAMILY HISTORY:    None pertinent    ITEMS NOT CHECKED ARE NOT PRESENT    SOCIAL HISTORY:   Significant other/partner[ ]  Children[ ]  Islam/Spirituality:  Substance hx:  [ ]   Tobacco hx:  [ ]   Alcohol hx: [ ]   Living Situation: [ x]Home  [ ]Long term care  [ ]Rehab [ ]Other  Home Services: [ ] HHA [ ] Visting RN [ ] Hospice  Occupation:  Home Opioid hx:  [ ] Y [ ] N [x ] I-Stop Reference No:    Reference #: [id number] - no meds     ADVANCE DIRECTIVES:     [ ] Full Code [ x] DNR  MOLST  [ ]  Living Will  [ ]   DECISION MAKER(s):  [ ] Health Care Proxy(s)  [ x] Surrogate(s)  [ ] Guardian           Name(s): Phone Number(s):  Ines Latrice      BASELINE (I)ADL(s) (prior to admission):    [place]: [ ]Total  [ ] Moderate [ ]Dependent  Palliative Performance Status Version 2:         %    http://npcrc.org/files/news/palliative_performance_scale_ppsv2.pdf    Allergies    No Known Allergies    Intolerances    MEDICATIONS  (STANDING):  chlorhexidine 2% Cloths 1 Application(s) Topical <User Schedule>  CRRT Treatment    <Continuous>  dextrose 50% Injectable 50 milliLiter(s) IV Push every 15 minutes  dextrose 50% Injectable 50 milliLiter(s) IV Push once  heparin  Infusion. 400 Unit(s)/Hr (4 mL/Hr) IV Continuous <Continuous>  insulin regular Infusion 6 Unit(s)/Hr (6 mL/Hr) IV Continuous <Continuous>  memantine 10 milliGRAM(s) Oral two times a day  norepinephrine Infusion 0.05 MICROgram(s)/kG/Min (2.92 mL/Hr) IV Continuous <Continuous>  pantoprazole  Injectable 40 milliGRAM(s) IV Push daily  PARoxetine 30 milliGRAM(s) Oral daily  PureFlow Dialysate RFP-400 (K 2 / Ca 3) 5000 milliLiter(s) (2000 mL/Hr) CRRT <Continuous>  rosuvastatin 10 milliGRAM(s) Oral at bedtime  sodium bicarbonate  Infusion 0.708 mEq/kG/Hr (150 mL/Hr) IV Continuous <Continuous>    MEDICATIONS  (PRN):  albuterol/ipratropium for Nebulization 3 milliLiter(s) Nebulizer every 6 hours PRN Shortness of Breath and/or Wheezing  sodium chloride 0.9% lock flush 10 milliLiter(s) IV Push every 1 hour PRN Pre/post blood products, medications, blood draw, and to maintain line patency    PRESENT SYMPTOMS: [x ]Unable to obtain due to poor mentation   Source if other than patient:  [ ]Family   [ ]Team     Pain: [ ]yes [ ]no  ALL PAIN ASSESSMENT COMPONENTS WERE ASKED ABOUT UNLESS OTHERWISE NOTED  QOL impact -   Location -                    Aggravating factors -  Quality -  Radiation -  Timing-  Severity (0-10 scale):  Minimal acceptable level (0-10 scale):     CPOT:    https://www.[facility].org/getattachment/[email or web address]/Critical-Care-Pain-Observation-Tool-(CPOT)    PAIN AD Score: 1  http://geriatrictoolkit.[place].[facility]/cog/painad.pdf (press ctrl +  left click to view)    Dyspnea:                           [ ]None[ ]Mild [ ]Moderate [ ]Severe     Respiratory Distress Observation Scale (RDOS): 2  A score of 0 to 2 signifies little or no respiratory distress, 3 signifies mild distress, scores 4 to 6 indicate moderate distress, and scores greater than 7 signify severe distress  https://www.[facility].ca/sites/default/files/PDFS/851725-[place]-xwnxshtk-fzyssmbhexe-vplvq.pdf    Anxiety:                             [ ]None[ ]Mild [ ]Moderate [ ]Severe   Fatigue:                             [ ]None[ ]Mild [ ]Moderate [ ]Severe   Nausea:                             [ ]None[ ]Mild [ ]Moderate [ ]Severe   Loss of appetite:              [ ]None[ ]Mild [ ]Moderate [ ]Severe   Constipation:                    [ ]None[ ]Mild [ ]Moderate [ ]Severe    Other Symptoms:  [x ]All other review of systems negative     Palliative Performance Status Version 2:         20%    http://npcrc.org/files/news/palliative_performance_scale_ppsv2.pdf    PHYSICAL EXAM:  Vital Signs Last 24 Hrs  T(C): 33.6 (22 Aug 2024 13:00), Max: 36.3 (21 Aug 2024 15:52)  T(F): 92.5 (22 Aug 2024 13:00), Max: 97.3 (21 Aug 2024 15:52)  HR: 90 (22 Aug 2024 13:00) (60 - 109)  BP: 135/60 (22 Aug 2024 13:00) (82/38 - 142/63)  BP(mean): 87 (22 Aug 2024 13:00) (65 - 90)  RR: 19 (22 Aug 2024 13:00) (15 - 20)  SpO2: 99% (22 Aug 2024 13:00) (89% - 100%)    Parameters below as of 22 Aug 2024 07:00  Patient On (Oxygen Delivery Method): mask, nonrebreather  O2 Flow (L/min): 10   I&O's Summary    21 Aug 2024 07:01  -  22 Aug 2024 07:00  --------------------------------------------------------  IN: 3927 mL / OUT: 90 mL / NET: 3837 mL    22 Aug 2024 07:01  -  22 Aug 2024 13:32  --------------------------------------------------------  IN: 1438.8 mL / OUT: 1325 mL / NET: 113.8 mL        GENERAL:  [ ] No acute distress [ ]Lethargic  [ ]Unarousable  [ ]Verbal  [x ]Non-Verbal [ ]Cachexia    BEHAVIORAL/PSYCH:  [ ]Alert and Oriented x  [ ] Anxiety [ ] Delirium [ ] Agitation [ ] Calm   EYES: [ x] No scleral icterus [ ] Scleral icterus [ ] Closed  ENMT:  [ ]Dry mouth  [ ]No external oral lesions [ ] No external ear or nose lesions  CARDIOVASCULAR:  [ ]Regular [ ]Irregular [ ]Tachy [ ]Not Tachy  [ ]Abdirahman [ ] Edema [ ] No edema  PULMONARY:  [ ]Tachypnea  [ ]Audible excessive secretions [ ] No labored breathing [ ] labored breathing  GASTROINTESTINAL: [ ]Soft  [ ]Distended  [ ]Not distended [ ]Non tender [ ]Tender  MUSCULOSKELETAL: [ ]No clubbing [ ] clubbing  [ ] No cyanosis [ ] cyanosis  NEUROLOGIC: [ ]No focal deficits  [ ]Follows commands  [ x]Does not follow commands  [ ]Cognitive impairment  [ ]Dysphagia  [ ]Dysarthria  [ ]Paresis   SKIN: [ ] Jaundiced [ ] Non-jaundiced [ ]Rash [ ]No Rash [ ] Warm [ ] Dry  MISC/LINES: [ ] ET tube [ ] Trach [ ]NGT/OGT [ ]PEG [ ]Shah    LABS: reviewed by me                        10.0   14.85 )-----------( 502      ( 22 Aug 2024 12:11 )             32.8   08-22    156<H>  |  101  |  94<HH>  ----------------------------<  70  4.7   |  11<L>  |  3.3<H>    Ca    9.0      22 Aug 2024 12:11  Phos  8.0     08-22  Mg     2.0     08-22    TPro  4.6<L>  /  Alb  2.4<L>  /  TBili  0.2  /  DBili  <0.2  /  AST  353<H>  /  ALT  254<H>  /  AlkPhos  80  08-21  PT/INR - ( 22 Aug 2024 07:30 )   PT: 18.50 sec;   INR: 1.61 ratio         PTT - ( 22 Aug 2024 12:11 )  PTT:175.4 sec    Urinalysis Basic - ( 22 Aug 2024 12:11 )    Color: x / Appearance: x / SG: x / pH: x  Gluc: 70 mg/dL / Ketone: x  / Bili: x / Urobili: x   Blood: x / Protein: x / Nitrite: x   Leuk Esterase: x / RBC: x / WBC x   Sq Epi: x / Non Sq Epi: x / Bacteria: x      RADIOLOGY & ADDITIONAL STUDIES: reviewed by me    < from: Xray Kidney Ureter Bladder (08.22.24 @ 10:24) >    Nonobstructive bowel gas pattern.    Study is limited in evaluation for perforation. If there remains a high   clinical concern, CT of abdomen pelvis is recommended.    There is a catheter within the rectum. There is a right-sided pelvic   central venous catheter.    The osseous structures demonstrate degenerative changes.    < end of copied text >      EKG: reviewed by me    < from: 12 Lead ECG (08.22.24 @ 10:29) >  Ventricular Rate 100 BPM    Atrial Rate 100 BPM    P-R Interval 152 ms    QRS Duration 72 ms    Q-T Interval 312 ms    QTC Calculation(Bazett) 402 ms    P Axis 68 degrees    R Axis -41 degrees    T Axis 206 degrees    Diagnosis Line Sinus rhythm with Premature supraventricular complexes  Left axis deviation  Low voltage QRS  Cannot rule out Anteroseptal infarct , age undetermined  Abnormal ECG    < end of copied text >      PROTEIN CALORIE MALNUTRITION PRESENT: [ ]mild [ ]moderate [ ]severe [ ]underweight [ ]morbid obesity  https://www.andeal.org/vault/2440/web/files/ONC/Table_Clinical%20Characteristics%20to%20Document%20Malnutrition-White%20JV%20et%20al%883952.pdf    Height (cm): 154.9 (08-21-24 @ 22:04), 154.9 (05-13-24 @ 14:25), 157.5 (05-01-24 @ 21:07)  Weight (kg): 62.3 (08-21-24 @ 22:04), 79.1 (05-13-24 @ 14:25), 76 (05-01-24 @ 21:07)  BMI (kg/m2): 26 (08-21-24 @ 22:04), 33 (05-13-24 @ 14:25), 30.6 (05-01-24 @ 21:07)  [ ]PPSV2 < or = to 30% [ ]significant weight loss  [ ]poor nutritional intake  [ ]anasarca      [ ]Artificial Nutrition      Palliative Care Spiritual/Emotional Screening Tool Question  Severity (0-4):                    OR                    [ x] Unable to determine. Will assess at later time if appropriate.  Score of 2 or greater indicates recommendation of Chaplaincy and/or SW referral  Chaplaincy Referral: [ ] Yes [ ] Refused [ ] Following     Caregiver [place]:  [ ] Yes [ ] No    OR    [x ] Unable to determine. Will assess at later time if appropriate.  Social Work Referral [ ]  Patient and Family Centered Care Referral [ ]    Anticipatory Grief Present: [ ] Yes [ ] No    OR     [ x] Unable to determine. Will assess at later time if appropriate.  Social Work Referral [ ]  Patient and Family Centered Care Referral [ ]    Patient discussed with primary medical team MD  Palliative care education provided to patient and/or family

## 2024-08-22 NOTE — CONSULT NOTE ADULT - NS ATTEST RISK PROBLEM GEN_ALL_CORE FT
The patient's injury acutely impairs one or more vital organ systems, and there is a high probability of imminent or life threatening deterioration in the patient’s condition. The care of this patient requires complex medical decision making in the field of Infectious Diseases and extensive interpretation of laboratory, microbiological, and radiographic data

## 2024-08-22 NOTE — CONSULT NOTE ADULT - SUBJECTIVE AND OBJECTIVE BOX
INFECTIOUS DISEASE CONSULT NOTE    Patient is a 83y old  Female who presents with a chief complaint of Sepsis 2/2 UTI (22 Aug 2024 06:52)    HPI:  83 yoF PMH HTN/ HLD/ DM. BIBEMS for Altered mental status.    (21 Aug 2024 20:50)         Prior hospital charts reviewed [Yes]  Primary team notes reviewed [Yes]  Other consultant notes reviewed [Yes]    REVIEW OF SYSTEMS:      PAST MEDICAL & SURGICAL HISTORY:  COPD (chronic obstructive pulmonary disease)      Diabetes mellitus, type 2      High blood cholesterol      Essential hypertension      Dementia      Depression      No significant past surgical history          SOCIAL HISTORY:  - Born in _____, migrated [place] in 19XX  - Currently working as / Retired  - Lives with _____; no pets  - No recent travel  - Denies tobacco use  - Denies alcohol use  - Denies illicit drug use  - Currently sexually active, has one male/female sexual partner    FAMILY HISTORY:      Allergies:  No Known Allergies      ANTIMICROBIALS:      ANTIMICROBIALS (past 90 days):  MEDICATIONS  (STANDING):  cefepime   IVPB   100 mL/Hr IV Intermittent (08-21-24 @ 20:38)    levoFLOXacin IVPB   50 mL/Hr IV Intermittent (08-21-24 @ 21:02)    meropenem  IVPB   100 mL/Hr IV Intermittent (08-22-24 @ 02:20)    meropenem  IVPB   100 mL/Hr IV Intermittent (08-22-24 @ 08:50)    vancomycin  IVPB.   250 mL/Hr IV Intermittent (08-21-24 @ 18:52)        OTHER MEDS:   MEDICATIONS  (STANDING):  albuterol/ipratropium for Nebulization 3 every 6 hours PRN  dextrose 50% Injectable 50 every 15 minutes  heparin  Infusion. 400 <Continuous>  insulin regular Infusion 6 <Continuous>  memantine 10 two times a day  norepinephrine Infusion 0.05 <Continuous>  PARoxetine 30 daily  rosuvastatin 10 at bedtime      VITALS:  Vital Signs Last 24 Hrs  T(F): 94.5 (08-22-24 @ 07:00), Max: 97.3 (08-21-24 @ 15:52)    Vital Signs Last 24 Hrs  HR: 98 (08-22-24 @ 08:00) (60 - 109)  BP: 116/56 (08-22-24 @ 08:00) (82/38 - 142/63)  RR: 15 (08-22-24 @ 08:00)  SpO2: 100% (08-22-24 @ 08:00) (99% - 100%)  Wt(kg): --    EXAM:    Labs:                        8.4    22.71 )-----------( 468      ( 22 Aug 2024 07:07 )             28.8     08-22    x   |  102  |  x   ----------------------------<  230<H>  5.2<H>   |  x   |  x     Ca    9.2      22 Aug 2024 07:07  Phos  8.0     08-22  Mg     2.2     08-22    TPro  4.6<L>  /  Alb  2.4<L>  /  TBili  0.2  /  DBili  <0.2  /  AST  353<H>  /  ALT  254<H>  /  AlkPhos  80  08-21      WBC Trend:  WBC Count: 22.71 (08-22-24 @ 07:07)  WBC Count: 18.98 (08-21-24 @ 21:30)  WBC Count: 21.75 (08-21-24 @ 16:30)      Auto Neutrophil #: 18.64 K/uL (08-21-24 @ 16:30)  Auto Neutrophil #: 14.78 K/uL (05-14-24 @ 06:25)  Auto Neutrophil #: 17.78 K/uL (05-13-24 @ 00:43)  Band Neutrophils %: 12.0 % (05-13-24 @ 00:43)  Auto Neutrophil #: 9.61 K/uL (05-03-24 @ 07:13)      Creatine Trend:  Creatinine: 5.5 (08-21)  Creatinine: 6.5 (08-21)      Liver Biochemical Testing Trend:  Alanine Aminotransferase (ALT/SGPT): 254 *H* (08-21)  Alanine Aminotransferase (ALT/SGPT): 15 (08-21)  Alanine Aminotransferase (ALT/SGPT): 14 (05-14)  Alanine Aminotransferase (ALT/SGPT): 13 (05-13)  Alanine Aminotransferase (ALT/SGPT): 35 (05-03)  Aspartate Aminotransferase (AST/SGOT): 353 (08-21-24 @ 21:30)  Aspartate Aminotransferase (AST/SGOT): 22 (08-21-24 @ 16:30)  Aspartate Aminotransferase (AST/SGOT): 14 (05-14-24 @ 06:25)  Aspartate Aminotransferase (AST/SGOT): 13 (05-13-24 @ 00:43)  Aspartate Aminotransferase (AST/SGOT): 33 (05-03-24 @ 07:13)  Bilirubin Total: 0.2 (08-21)  Bilirubin Direct: <0.2 (08-21)  Bilirubin Total: 0.2 (08-21)  Bilirubin Total: 0.2 (05-14)  Bilirubin Total: 0.3 (05-13)      Trend LDH      Auto Eosinophil %: 0.0 % (08-21-24 @ 16:30)      Urinalysis Basic - ( 22 Aug 2024 07:07 )    Color: x / Appearance: x / SG: x / pH: x  Gluc: 230 mg/dL / Ketone: x  / Bili: x / Urobili: x   Blood: x / Protein: x / Nitrite: x   Leuk Esterase: x / RBC: x / WBC x   Sq Epi: x / Non Sq Epi: x / Bacteria: x          MICROBIOLOGY:    MRSA PCR Result.: Negative (05-13-24 @ 02:08)  MRSA PCR Result.: Negative (05-02-24 @ 23:22)      Urinalysis with Rflx Culture (collected 21 Aug 2024 16:30)    Troponin T, High Sensitivity Result: 233 (08-22)  Troponin T, High Sensitivity Result: 190 (08-21)  Troponin T, High Sensitivity Result: 217 (08-21)  Troponin T, High Sensitivity Result: 256 (08-21)    Blood Gas Arterial, Lactate: >16.0 (08-21 @ 21:15)  Blood Gas Venous - Lactate: >16.0 (08-21 @ 18:39)  Blood Gas Venous - Lactate: 13.6 (08-21 @ 17:12)    A1C with Estimated Average Glucose Result: 7.7 % (05-13-24 @ 06:54)        RADIOLOGY:  imaging below personally reviewed    < from: CT Head No Cont (08.21.24 @ 17:23) >  IMPRESSION:    No evidence of acute intracranial pathology.    No evidence of acute intracranial hemorrhage or acute territorial infarct.    No evidence of mass or midline shift.    < end of copied text >   INFECTIOUS DISEASE CONSULT NOTE    Patient is a 83y old  Female who presents with a chief complaint of Sepsis 2/2 UTI (22 Aug 2024 06:52)    HPI:  83 year old female with pmhx of htn, hld, dm, copd on 2L NC baseline, dementia nonverbal baseline presents with family for evaluation of shortness of breath and was BIBEMS after being found hypoxic on RA and hypotensive. Patient unable to endorse complaints. Family did not note any other specific symptoms. Per chart review, patient admitted recently for PNA/UTI ifx. She was also noted to be eating at the time of the shortness of breath episode. They deny any other complaints. Family stated they wish patient to be full code.    Prior hospital charts reviewed [Yes]  Primary team notes reviewed [Yes]  Other consultant notes reviewed [Yes]    REVIEW OF SYSTEMS:  Unable to provide due to cognitive impairment    PAST MEDICAL & SURGICAL HISTORY:  COPD (chronic obstructive pulmonary disease)      Diabetes mellitus, type 2      High blood cholesterol      Essential hypertension      Dementia      Depression      No significant past surgical history          SOCIAL HISTORY:  Unable to provide due to cognitive impairment    FAMILY HISTORY:  Unable to provide due to cognitive impairment      Allergies:  No Known Allergies      ANTIMICROBIALS:      ANTIMICROBIALS (past 90 days):  MEDICATIONS  (STANDING):  cefepime   IVPB   100 mL/Hr IV Intermittent (08-21-24 @ 20:38)    levoFLOXacin IVPB   50 mL/Hr IV Intermittent (08-21-24 @ 21:02)    meropenem  IVPB   100 mL/Hr IV Intermittent (08-22-24 @ 02:20)    meropenem  IVPB   100 mL/Hr IV Intermittent (08-22-24 @ 08:50)    vancomycin  IVPB.   250 mL/Hr IV Intermittent (08-21-24 @ 18:52)        OTHER MEDS:   MEDICATIONS  (STANDING):  albuterol/ipratropium for Nebulization 3 every 6 hours PRN  dextrose 50% Injectable 50 every 15 minutes  heparin  Infusion. 400 <Continuous>  insulin regular Infusion 6 <Continuous>  memantine 10 two times a day  norepinephrine Infusion 0.05 <Continuous>  PARoxetine 30 daily  rosuvastatin 10 at bedtime      VITALS:  Vital Signs Last 24 Hrs  T(F): 94.5 (08-22-24 @ 07:00), Max: 97.3 (08-21-24 @ 15:52)    Vital Signs Last 24 Hrs  HR: 98 (08-22-24 @ 08:00) (60 - 109)  BP: 116/56 (08-22-24 @ 08:00) (82/38 - 142/63)  RR: 15 (08-22-24 @ 08:00)  SpO2: 100% (08-22-24 @ 08:00) (99% - 100%)  Wt(kg): --    EXAM:  GENERAL: NAD, lying in bed  HEAD: No head lesions  EYES: Conjunctiva pink and cornea white  EAR, NOSE, MOUTH, THROAT: Normal external ears and nose, no discharges; moist mucous membranes  NECK: Supple, nontender to palpation; no JVD  RESPIRATORY: Clear to auscultation bilaterally  CARDIOVASCULAR: S1 S2  GASTROINTESTINAL: Soft, nontender, nondistended; normoactive bowel sounds  GENITOURINARY: No marcum catheter, no CVA tenderness  EXTREMITIES: No clubbing, cyanosis, or petal edema  NERVOUS SYSTEM: Alert and oriented to person, time, place and situation, speech clear. No focal deficits   MUSCULOSKELETAL: No joint erythema, swelling or pain  SKIN: No rashes or lesions, no superficial thrombophlebitis  PSYCH: Normal affect    Labs:                        8.4    22.71 )-----------( 468      ( 22 Aug 2024 07:07 )             28.8     08-22    x   |  102  |  x   ----------------------------<  230<H>  5.2<H>   |  x   |  x     Ca    9.2      22 Aug 2024 07:07  Phos  8.0     08-22  Mg     2.2     08-22    TPro  4.6<L>  /  Alb  2.4<L>  /  TBili  0.2  /  DBili  <0.2  /  AST  353<H>  /  ALT  254<H>  /  AlkPhos  80  08-21      WBC Trend:  WBC Count: 22.71 (08-22-24 @ 07:07)  WBC Count: 18.98 (08-21-24 @ 21:30)  WBC Count: 21.75 (08-21-24 @ 16:30)      Auto Neutrophil #: 18.64 K/uL (08-21-24 @ 16:30)  Auto Neutrophil #: 14.78 K/uL (05-14-24 @ 06:25)  Auto Neutrophil #: 17.78 K/uL (05-13-24 @ 00:43)  Band Neutrophils %: 12.0 % (05-13-24 @ 00:43)  Auto Neutrophil #: 9.61 K/uL (05-03-24 @ 07:13)      Creatine Trend:  Creatinine: 5.5 (08-21)  Creatinine: 6.5 (08-21)      Liver Biochemical Testing Trend:  Alanine Aminotransferase (ALT/SGPT): 254 *H* (08-21)  Alanine Aminotransferase (ALT/SGPT): 15 (08-21)  Alanine Aminotransferase (ALT/SGPT): 14 (05-14)  Alanine Aminotransferase (ALT/SGPT): 13 (05-13)  Alanine Aminotransferase (ALT/SGPT): 35 (05-03)  Aspartate Aminotransferase (AST/SGOT): 353 (08-21-24 @ 21:30)  Aspartate Aminotransferase (AST/SGOT): 22 (08-21-24 @ 16:30)  Aspartate Aminotransferase (AST/SGOT): 14 (05-14-24 @ 06:25)  Aspartate Aminotransferase (AST/SGOT): 13 (05-13-24 @ 00:43)  Aspartate Aminotransferase (AST/SGOT): 33 (05-03-24 @ 07:13)  Bilirubin Total: 0.2 (08-21)  Bilirubin Direct: <0.2 (08-21)  Bilirubin Total: 0.2 (08-21)  Bilirubin Total: 0.2 (05-14)  Bilirubin Total: 0.3 (05-13)      Trend LDH      Auto Eosinophil %: 0.0 % (08-21-24 @ 16:30)      Urinalysis Basic - ( 22 Aug 2024 07:07 )    Color: x / Appearance: x / SG: x / pH: x  Gluc: 230 mg/dL / Ketone: x  / Bili: x / Urobili: x   Blood: x / Protein: x / Nitrite: x   Leuk Esterase: x / RBC: x / WBC x   Sq Epi: x / Non Sq Epi: x / Bacteria: x          MICROBIOLOGY:    MRSA PCR Result.: Negative (05-13-24 @ 02:08)  MRSA PCR Result.: Negative (05-02-24 @ 23:22)      Urinalysis with Rflx Culture (collected 21 Aug 2024 16:30)    Troponin T, High Sensitivity Result: 233 (08-22)  Troponin T, High Sensitivity Result: 190 (08-21)  Troponin T, High Sensitivity Result: 217 (08-21)  Troponin T, High Sensitivity Result: 256 (08-21)    Blood Gas Arterial, Lactate: >16.0 (08-21 @ 21:15)  Blood Gas Venous - Lactate: >16.0 (08-21 @ 18:39)  Blood Gas Venous - Lactate: 13.6 (08-21 @ 17:12)    A1C with Estimated Average Glucose Result: 7.7 % (05-13-24 @ 06:54)        RADIOLOGY:  imaging below personally reviewed    < from: CT Head No Cont (08.21.24 @ 17:23) >  IMPRESSION:    No evidence of acute intracranial pathology.    No evidence of acute intracranial hemorrhage or acute territorial infarct.    No evidence of mass or midline shift.    < end of copied text >   INFECTIOUS DISEASE CONSULT NOTE    Patient is a 83y old  Female who presents with a chief complaint of Sepsis 2/2 UTI (22 Aug 2024 06:52)    HPI:  83 year old female with pmhx of htn, hld, dm, copd on 2L NC baseline, dementia nonverbal baseline presents with family for evaluation of shortness of breath and was BIBEMS after being found hypoxic on RA and hypotensive. Patient unable to endorse complaints. Family did not note any other specific symptoms. Per chart review, patient admitted recently for PNA/UTI ifx. She was also noted to be eating at the time of the shortness of breath episode. They deny any other complaints. Family stated they wish patient to be full code.    Prior hospital charts reviewed [Yes]  Primary team notes reviewed [Yes]  Other consultant notes reviewed [Yes]    REVIEW OF SYSTEMS:  Unable to provide due to cognitive impairment    PAST MEDICAL & SURGICAL HISTORY:  COPD (chronic obstructive pulmonary disease)      Diabetes mellitus, type 2      High blood cholesterol      Essential hypertension      Dementia      Depression      No significant past surgical history          SOCIAL HISTORY:  Unable to provide due to cognitive impairment    FAMILY HISTORY:  Unable to provide due to cognitive impairment      Allergies:  No Known Allergies      ANTIMICROBIALS:      ANTIMICROBIALS (past 90 days):  MEDICATIONS  (STANDING):  cefepime   IVPB   100 mL/Hr IV Intermittent (08-21-24 @ 20:38)    levoFLOXacin IVPB   50 mL/Hr IV Intermittent (08-21-24 @ 21:02)    meropenem  IVPB   100 mL/Hr IV Intermittent (08-22-24 @ 02:20)    meropenem  IVPB   100 mL/Hr IV Intermittent (08-22-24 @ 08:50)    vancomycin  IVPB.   250 mL/Hr IV Intermittent (08-21-24 @ 18:52)        OTHER MEDS:   MEDICATIONS  (STANDING):  albuterol/ipratropium for Nebulization 3 every 6 hours PRN  dextrose 50% Injectable 50 every 15 minutes  heparin  Infusion. 400 <Continuous>  insulin regular Infusion 6 <Continuous>  memantine 10 two times a day  norepinephrine Infusion 0.05 <Continuous>  PARoxetine 30 daily  rosuvastatin 10 at bedtime      VITALS:  Vital Signs Last 24 Hrs  T(F): 94.5 (08-22-24 @ 07:00), Max: 97.3 (08-21-24 @ 15:52)    Vital Signs Last 24 Hrs  HR: 98 (08-22-24 @ 08:00) (60 - 109)  BP: 116/56 (08-22-24 @ 08:00) (82/38 - 142/63)  RR: 15 (08-22-24 @ 08:00)  SpO2: 100% (08-22-24 @ 08:00) (99% - 100%)  Wt(kg): --    EXAM:  GENERAL: Lethargic, lying in bed  HEAD: No head lesions  EYES: Conjunctiva pink and cornea white  EAR, NOSE, MOUTH, THROAT: Normal external ears and nose, no discharges; dry mucous membranes; + NRB  NECK: Supple, nontender to palpation; no JVD  RESPIRATORY: Bibasilar crackles  CARDIOVASCULAR: S1 S2  GASTROINTESTINAL: Soft, nontender, nondistended; normoactive bowel sounds  GENITOURINARY: + marcum catheter  EXTREMITIES: No clubbing, cyanosis, or petal edema  NERVOUS SYSTEM: Minimally responsive   MUSCULOSKELETAL: No joint erythema, swelling  SKIN: No rash,  no superficial thrombophlebitis  PSYCH: Minimally responsive  LINE: RIJ TLC, R fem [place]    Labs:                        8.4    22.71 )-----------( 468      ( 22 Aug 2024 07:07 )             28.8     08-22    x   |  102  |  x   ----------------------------<  230<H>  5.2<H>   |  x   |  x     Ca    9.2      22 Aug 2024 07:07  Phos  8.0     08-22  Mg     2.2     08-22    TPro  4.6<L>  /  Alb  2.4<L>  /  TBili  0.2  /  DBili  <0.2  /  AST  353<H>  /  ALT  254<H>  /  AlkPhos  80  08-21      WBC Trend:  WBC Count: 22.71 (08-22-24 @ 07:07)  WBC Count: 18.98 (08-21-24 @ 21:30)  WBC Count: 21.75 (08-21-24 @ 16:30)      Auto Neutrophil #: 18.64 K/uL (08-21-24 @ 16:30)  Auto Neutrophil #: 14.78 K/uL (05-14-24 @ 06:25)  Auto Neutrophil #: 17.78 K/uL (05-13-24 @ 00:43)  Band Neutrophils %: 12.0 % (05-13-24 @ 00:43)  Auto Neutrophil #: 9.61 K/uL (05-03-24 @ 07:13)      Creatine Trend:  Creatinine: 5.5 (08-21)  Creatinine: 6.5 (08-21)      Liver Biochemical Testing Trend:  Alanine Aminotransferase (ALT/SGPT): 254 *H* (08-21)  Alanine Aminotransferase (ALT/SGPT): 15 (08-21)  Alanine Aminotransferase (ALT/SGPT): 14 (05-14)  Alanine Aminotransferase (ALT/SGPT): 13 (05-13)  Alanine Aminotransferase (ALT/SGPT): 35 (05-03)  Aspartate Aminotransferase (AST/SGOT): 353 (08-21-24 @ 21:30)  Aspartate Aminotransferase (AST/SGOT): 22 (08-21-24 @ 16:30)  Aspartate Aminotransferase (AST/SGOT): 14 (05-14-24 @ 06:25)  Aspartate Aminotransferase (AST/SGOT): 13 (05-13-24 @ 00:43)  Aspartate Aminotransferase (AST/SGOT): 33 (05-03-24 @ 07:13)  Bilirubin Total: 0.2 (08-21)  Bilirubin Direct: <0.2 (08-21)  Bilirubin Total: 0.2 (08-21)  Bilirubin Total: 0.2 (05-14)  Bilirubin Total: 0.3 (05-13)      Trend LDH      Auto Eosinophil %: 0.0 % (08-21-24 @ 16:30)      Urinalysis Basic - ( 22 Aug 2024 07:07 )    Color: x / Appearance: x / SG: x / pH: x  Gluc: 230 mg/dL / Ketone: x  / Bili: x / Urobili: x   Blood: x / Protein: x / Nitrite: x   Leuk Esterase: x / RBC: x / WBC x   Sq Epi: x / Non Sq Epi: x / Bacteria: x          MICROBIOLOGY:    MRSA PCR Result.: Negative (05-13-24 @ 02:08)  MRSA PCR Result.: Negative (05-02-24 @ 23:22)      Urinalysis with Rflx Culture (collected 21 Aug 2024 16:30)    Troponin T, High Sensitivity Result: 233 (08-22)  Troponin T, High Sensitivity Result: 190 (08-21)  Troponin T, High Sensitivity Result: 217 (08-21)  Troponin T, High Sensitivity Result: 256 (08-21)    Blood Gas Arterial, Lactate: >16.0 (08-21 @ 21:15)  Blood Gas Venous - Lactate: >16.0 (08-21 @ 18:39)  Blood Gas Venous - Lactate: 13.6 (08-21 @ 17:12)    A1C with Estimated Average Glucose Result: 7.7 % (05-13-24 @ 06:54)        RADIOLOGY:  imaging below personally reviewed    < from: CT Head No Cont (08.21.24 @ 17:23) >  IMPRESSION:    No evidence of acute intracranial pathology.    No evidence of acute intracranial hemorrhage or acute territorial infarct.    No evidence of mass or midline shift.    < end of copied text >

## 2024-08-22 NOTE — CONSULT NOTE ADULT - SUBJECTIVE AND OBJECTIVE BOX
Patient is a 83y old  Female who presents with a chief complaint of Sepsis 2/2 UTI (21 Aug 2024 20:50)      HPI:  83 yoF PMH HTN/ HLD/ DM. BIBEMS for Altered mental status.    (21 Aug 2024 20:50)      PAST MEDICAL & SURGICAL HISTORY:  COPD (chronic obstructive pulmonary disease)      Diabetes mellitus, type 2      High blood cholesterol      Essential hypertension      Dementia      Depression      No significant past surgical history        Allergies    No Known Allergies    Intolerances      FAMILY HISTORY:    Home Medications:  albuterol: 1 puff(s) inhaled every 8 hours as needed for  shortness of breath and/or wheezing (18 May 2024 14:30)  Breo Ellipta 200 mcg-25 mcg/inh inhalation powder: 1 puff(s) inhaled once a day (01 May 2024 14:30)  gabapentin 100 mg oral capsule: orally once a day (at bedtime) (01 May 2024 14:30)  memantine 10 mg oral tablet: 1 tab(s) orally 2 times a day (01 May 2024 14:30)  metFORMIN 500 mg oral tablet: 1 tab(s) orally 2 times a day (01 May 2024 14:30)  PARoxetine 30 mg oral tablet: 1 tab(s) orally once a day (01 May 2024 14:30)  rosuvastatin 10 mg oral tablet: 1 tab(s) orally once a day (01 May 2024 14:30)    Occupation:  Alochol: Denied  Smoking: Denied  Drug Use: Denied  Marital Status:         ROS: as in HPI; All other systems reviewed are negative    ICU Vital Signs Last 24 Hrs  T(C): 34.9 (22 Aug 2024 03:05), Max: 36.3 (21 Aug 2024 15:52)  T(F): 94.8 (22 Aug 2024 03:05), Max: 97.3 (21 Aug 2024 15:52)  HR: 107 (22 Aug 2024 05:00) (60 - 109)  BP: 142/63 (22 Aug 2024 05:00) (82/38 - 142/63)  BP(mean): 90 (22 Aug 2024 05:00) (70 - 90)  ABP: --  ABP(mean): --  RR: 18 (22 Aug 2024 05:00) (17 - 20)  SpO2: 100% (22 Aug 2024 05:00) (99% - 100%)    O2 Parameters below as of 22 Aug 2024 03:00  Patient On (Oxygen Delivery Method): mask, nonrebreather  O2 Flow (L/min): 10            Physical Examination:    General: No acute distress.  Alert, oriented, interactive, nonfocal    HEENT: Pupils equal, reactive to light.  Symmetric.    PULM: Clear to auscultation bilaterally, no significant sputum production    CVS: Regular rate and rhythm, no murmurs, rubs, or gallops    ABD: Soft, nondistended, nontender, normoactive bowel sounds, no masses    EXT: No edema, nontender    SKIN: Warm and well perfused, no rashes noted.          ABG - ( 21 Aug 2024 23:54 )  pH, Arterial: 6.99  pH, Blood: x     /  pCO2: 31    /  pO2: 48    / HCO3: 8     / Base Excess: -22.2 /  SaO2: 73.2                I&O's Detail    21 Aug 2024 07:01  -  22 Aug 2024 06:53  --------------------------------------------------------  IN:    Heparin: 20 mL    Insulin: 41 mL    IV PiggyBack: 330 mL    Lactated Ringers Bolus: 1500 mL    Norepinephrine: 536 mL    Sodium Bicarbonate: 1500 mL  Total IN: 3927 mL    OUT:    Indwelling Catheter - Urethral (mL): 90 mL  Total OUT: 90 mL    Total NET: 3837 mL            LABS:                        6.3    18.98 )-----------( 563      ( 21 Aug 2024 21:30 )             23.8     21 Aug 2024 21:30    150    |  100    |  148    ----------------------------<  TNP    6.6     |  5      |  5.5      Ca    10.3       21 Aug 2024 21:30    TPro  4.6    /  Alb  2.4    /  TBili  0.2    /  DBili  <0.2   /  AST  353    /  ALT  254    /  AlkPhos  80     21 Aug 2024 21:30  Amylase x     lipase x              CAPILLARY BLOOD GLUCOSE      POCT Blood Glucose.: 308 mg/dL (22 Aug 2024 06:02)    PT/INR - ( 21 Aug 2024 16:30 )   PT: 15.40 sec;   INR: 1.35 ratio         PTT - ( 21 Aug 2024 16:30 )  PTT:TNP sec  Urinalysis Basic - ( 21 Aug 2024 21:30 )    Color: x / Appearance: x / SG: x / pH: x  Gluc: TNP mg/dL / Ketone: x  / Bili: x / Urobili: x   Blood: x / Protein: x / Nitrite: x   Leuk Esterase: x / RBC: x / WBC x   Sq Epi: x / Non Sq Epi: x / Bacteria: x      Culture    Urinalysis with Rflx Culture (collected 21 Aug 2024 16:30)          MEDICATIONS  (STANDING):  chlorhexidine 2% Cloths 1 Application(s) Topical <User Schedule>  CRRT Treatment    <Continuous>  dextrose 50% Injectable 50 milliLiter(s) IV Push every 15 minutes  heparin  Infusion 500 Unit(s)/Hr (5 mL/Hr) IV Continuous <Continuous>  insulin regular Infusion 6 Unit(s)/Hr (6 mL/Hr) IV Continuous <Continuous>  memantine 10 milliGRAM(s) Oral two times a day  norepinephrine Infusion 0.05 MICROgram(s)/kG/Min (2.98 mL/Hr) IV Continuous <Continuous>  PARoxetine 30 milliGRAM(s) Oral daily  PrismaSATE Dialysate BGK 4 / 2.5 5000 milliLiter(s) (950 mL/Hr) CRRT <Continuous>  rosuvastatin 10 milliGRAM(s) Oral at bedtime  sodium bicarbonate  Infusion 0.708 mEq/kG/Hr (150 mL/Hr) IV Continuous <Continuous>    MEDICATIONS  (PRN):  albuterol/ipratropium for Nebulization 3 milliLiter(s) Nebulizer every 6 hours PRN Shortness of Breath and/or Wheezing  sodium chloride 0.9% lock flush 10 milliLiter(s) IV Push every 1 hour PRN Pre/post blood products, medications, blood draw, and to maintain line patency        RADIOLOGY: ***     CXR:  TLC:  OG:  ET tube:          ECHO:               Patient is a 83y old  Female who presents with a chief complaint of Sepsis 2/2 UTI (21 Aug 2024 20:50)      HPI:Patient is an 83 year old female with pmhx of htn, hld, dm, copd on 2L NC baseline, dementia nonverbal baseline presents with family for evaluation of shortness of breath and was BIBEMS after being found hypoxic on RA and hypotensive. Patient unable to endorse complaints. Family did not note any other specific symptoms. Per chart review, patient admitted recently for PNA/UTI ifx. She was also noted to be eating at the time of the shortness of breath episode. They deny any other complaints. on pressors   started on CVVH on levo 0.8   DNR/DNI   PAST MEDICAL & SURGICAL HISTORY:  COPD (chronic obstructive pulmonary disease)      Diabetes mellitus, type 2      High blood cholesterol      Essential hypertension      Dementia      Depression      No significant past surgical history        Allergies    No Known Allergies    Intolerances      FAMILY HISTORY:    Home Medications:  albuterol: 1 puff(s) inhaled every 8 hours as needed for  shortness of breath and/or wheezing (18 May 2024 14:30)  Breo Ellipta 200 mcg-25 mcg/inh inhalation powder: 1 puff(s) inhaled once a day (01 May 2024 14:30)  gabapentin 100 mg oral capsule: orally once a day (at bedtime) (01 May 2024 14:30)  memantine 10 mg oral tablet: 1 tab(s) orally 2 times a day (01 May 2024 14:30)  metFORMIN 500 mg oral tablet: 1 tab(s) orally 2 times a day (01 May 2024 14:30)  PARoxetine 30 mg oral tablet: 1 tab(s) orally once a day (01 May 2024 14:30)  rosuvastatin 10 mg oral tablet: 1 tab(s) orally once a day (01 May 2024 14:30)    Occupation:  Alochol: Denied  Smoking: Denied  Drug Use: Denied  Marital Status:         ROS: as in HPI; All other systems reviewed are negative    ICU Vital Signs Last 24 Hrs  T(C): 34.9 (22 Aug 2024 03:05), Max: 36.3 (21 Aug 2024 15:52)  T(F): 94.8 (22 Aug 2024 03:05), Max: 97.3 (21 Aug 2024 15:52)  HR: 107 (22 Aug 2024 05:00) (60 - 109)  BP: 142/63 (22 Aug 2024 05:00) (82/38 - 142/63)  BP(mean): 90 (22 Aug 2024 05:00) (70 - 90)  ABP: --  ABP(mean): --  RR: 18 (22 Aug 2024 05:00) (17 - 20)  SpO2: 100% (22 Aug 2024 05:00) (99% - 100%)    O2 Parameters below as of 22 Aug 2024 03:00  Patient On (Oxygen Delivery Method): mask, nonrebreather  O2 Flow (L/min): 10            Physical Examination:    General:altered     HEENT: Pupils equal, reactive to light.  Symmetric.    PULM: Clear to auscultation bilaterally, no significant sputum production    CVS: Regular rate and rhythm, no murmurs, rubs, or gallops    ABD: Soft, nondistended, nontender, normoactive bowel sounds, no masses    EXT: 1 edema, nontender    SKIN: Warm and well perfused, no rashes noted.          ABG - ( 21 Aug 2024 23:54 )  pH, Arterial: 6.99  pH, Blood: x     /  pCO2: 31    /  pO2: 48    / HCO3: 8     / Base Excess: -22.2 /  SaO2: 73.2                I&O's Detail    21 Aug 2024 07:01  -  22 Aug 2024 06:53  --------------------------------------------------------  IN:    Heparin: 20 mL    Insulin: 41 mL    IV PiggyBack: 330 mL    Lactated Ringers Bolus: 1500 mL    Norepinephrine: 536 mL    Sodium Bicarbonate: 1500 mL  Total IN: 3927 mL    OUT:    Indwelling Catheter - Urethral (mL): 90 mL  Total OUT: 90 mL    Total NET: 3837 mL            LABS:                        6.3    18.98 )-----------( 563      ( 21 Aug 2024 21:30 )             23.8     21 Aug 2024 21:30    150    |  100    |  148    ----------------------------<  TNP    6.6     |  5      |  5.5      Ca    10.3       21 Aug 2024 21:30    TPro  4.6    /  Alb  2.4    /  TBili  0.2    /  DBili  <0.2   /  AST  353    /  ALT  254    /  AlkPhos  80     21 Aug 2024 21:30  Amylase x     lipase x              CAPILLARY BLOOD GLUCOSE      POCT Blood Glucose.: 308 mg/dL (22 Aug 2024 06:02)    PT/INR - ( 21 Aug 2024 16:30 )   PT: 15.40 sec;   INR: 1.35 ratio         PTT - ( 21 Aug 2024 16:30 )  PTT:TNP sec  Urinalysis Basic - ( 21 Aug 2024 21:30 )    Color: x / Appearance: x / SG: x / pH: x  Gluc: TNP mg/dL / Ketone: x  / Bili: x / Urobili: x   Blood: x / Protein: x / Nitrite: x   Leuk Esterase: x / RBC: x / WBC x   Sq Epi: x / Non Sq Epi: x / Bacteria: x      Culture    Urinalysis with Rflx Culture (collected 21 Aug 2024 16:30)          MEDICATIONS  (STANDING):  chlorhexidine 2% Cloths 1 Application(s) Topical <User Schedule>  CRRT Treatment    <Continuous>  dextrose 50% Injectable 50 milliLiter(s) IV Push every 15 minutes  heparin  Infusion 500 Unit(s)/Hr (5 mL/Hr) IV Continuous <Continuous>  insulin regular Infusion 6 Unit(s)/Hr (6 mL/Hr) IV Continuous <Continuous>  memantine 10 milliGRAM(s) Oral two times a day  norepinephrine Infusion 0.05 MICROgram(s)/kG/Min (2.98 mL/Hr) IV Continuous <Continuous>  PARoxetine 30 milliGRAM(s) Oral daily  PrismaSATE Dialysate BGK 4 / 2.5 5000 milliLiter(s) (950 mL/Hr) CRRT <Continuous>  rosuvastatin 10 milliGRAM(s) Oral at bedtime  sodium bicarbonate  Infusion 0.708 mEq/kG/Hr (150 mL/Hr) IV Continuous <Continuous>    MEDICATIONS  (PRN):  albuterol/ipratropium for Nebulization 3 milliLiter(s) Nebulizer every 6 hours PRN Shortness of Breath and/or Wheezing  sodium chloride 0.9% lock flush 10 milliLiter(s) IV Push every 1 hour PRN Pre/post blood products, medications, blood draw, and to maintain line patency        RADIOLOGY: ***     CXR:  TLC:  OG:  ET tube:          ECHO:

## 2024-08-22 NOTE — CHART NOTE - NSCHARTNOTEFT_GEN_A_CORE
Presence of clotting in udall, difficulty performing CVVH due to clotting overnight.   Patient started on Heparin infusion + bolus per Hemodialysis protocol.   Will attempt to restart CVVH.   Cont to Trend blood gas.   Supervising Physician aware.     Adolfo VELÁZQUEZ

## 2024-08-22 NOTE — CONSULT NOTE ADULT - ASSESSMENT
82 yo F with history of dementia (nonverbal at baseline, bedbound), DM, HTN, dyslipidemia, COPD, recently admitted for septic shock 2/2 PNA, now sent from home for increased work of breathing, possible aspiration PNA given events began during feeding with caretaker. Was found by EMS to be hypoxic with hypotension, not responding to IV hydration now requiring Levophed infusion. Labwork noted for leukocytosis, high lactate with HAGMA, and profound hyperkalemia with EKG changes.     KORI with hyperkalemia and severe met acidosis - due to septic shock  -started on CVVHD last night (clotted twice) on Heparin drip now     Is=OS  -increase Abx for CVVHD - Merrem 500 mg - 1 gr IV q8h  -cont Levophed and bicarb drip  - kidney and bladder sono r/o obstruction  # High NA - repeat BMP  #Toxic metabolic encephalopathy - CTH negative   #septic shock , UTI Hx of ESBL   metformin toxicity ??   # severe HAGMA  #Lactic acidosis >16; KUB noted   #Respiratory distress on NRB - DNI/DNR  # No enteral feeds at the moment  Prognosis is poor  D/W Granddaughter

## 2024-08-22 NOTE — CONSULT NOTE ADULT - SUBJECTIVE AND OBJECTIVE BOX
NEPHROLOGY CONSULTATION NOTE    Patient is a 83y Female whom presented to the hospital with altered MS.  Pt with dementia, very poor po intake at home for weeks, HTN, DM, depression admitted with altered MSTiffany Healy have KORI, hyperkalemia, severe met acidosis, started on pressors, ABx and bicarb dip and CVVH    PAST MEDICAL & SURGICAL HISTORY:  COPD (chronic obstructive pulmonary disease)      Diabetes mellitus, type 2      High blood cholesterol      Essential hypertension      Dementia      Depression      No significant past surgical history        Allergies:  No Known Allergies    Home Medications Reviewed  Hospital Medications:   MEDICATIONS  (STANDING):  chlorhexidine 2% Cloths 1 Application(s) Topical <User Schedule>  CRRT Treatment    <Continuous>  dextrose 50% Injectable 50 milliLiter(s) IV Push every 15 minutes  heparin  Infusion. 400 Unit(s)/Hr (4 mL/Hr) IV Continuous <Continuous>  insulin regular Infusion 6 Unit(s)/Hr (6 mL/Hr) IV Continuous <Continuous>  memantine 10 milliGRAM(s) Oral two times a day  norepinephrine Infusion 0.05 MICROgram(s)/kG/Min (2.92 mL/Hr) IV Continuous <Continuous>  pantoprazole  Injectable 40 milliGRAM(s) IV Push daily  PARoxetine 30 milliGRAM(s) Oral daily  PrismaSATE Dialysate BGK 4 / 2.5 5000 milliLiter(s) (950 mL/Hr) CRRT <Continuous>  rosuvastatin 10 milliGRAM(s) Oral at bedtime  sodium bicarbonate  Infusion 0.708 mEq/kG/Hr (150 mL/Hr) IV Continuous <Continuous>      SOCIAL HISTORY:  Denies ETOH,Smoking,   FAMILY HISTORY:    DNI: Trial NIV        REVIEW OF SYSTEMS:  Unresponsive, spoke to granddaughter - declining MS for the last few weeks/months, bedbound, unable to eat    VITALS:  T(F): 92.7 (08-22-24 @ 10:00), Max: 97.3 (08-21-24 @ 15:52)  HR: 96 (08-22-24 @ 10:00)  BP: 103/58 (08-22-24 @ 10:00)  RR: 18 (08-22-24 @ 10:00)  SpO2: 89% (08-22-24 @ 10:00)    08-21 @ 07:01  -  08-22 @ 07:00  --------------------------------------------------------  IN: 3927 mL / OUT: 90 mL / NET: 3837 mL    08-22 @ 07:01  -  08-22 @ 11:25  --------------------------------------------------------  IN: 907.8 mL / OUT: 652 mL / NET: 255.8 mL      Height (cm): 154.9 (08-21 @ 22:04)  Weight (kg): 62.3 (08-21 @ 22:04)  BMI (kg/m2): 26 (08-21 @ 22:04)  BSA (m2): 1.61 (08-21 @ 22:04)    08-21-24 @ 07:01  -  08-22-24 @ 07:00  --------------------------------------------------------  IN: 0 mL / OUT: 90 mL / NET: -90 mL    08-22-24 @ 07:01  -  08-22-24 @ 11:25  --------------------------------------------------------  IN: 0 mL / OUT: 35 mL / NET: -35 mL      I&O's Detail    21 Aug 2024 07:01  -  22 Aug 2024 07:00  --------------------------------------------------------  IN:    Heparin: 20 mL    Insulin: 41 mL    IV PiggyBack: 330 mL    Lactated Ringers Bolus: 1500 mL    Norepinephrine: 536 mL    Sodium Bicarbonate: 1500 mL  Total IN: 3927 mL    OUT:    Indwelling Catheter - Urethral (mL): 90 mL  Total OUT: 90 mL    Total NET: 3837 mL      22 Aug 2024 07:01  -  22 Aug 2024 11:25  --------------------------------------------------------  IN:    Heparin: 5 mL    Heparin Infusion: 13 mL    Insulin: 31 mL    IV PiggyBack: 50 mL    Norepinephrine: 208.8 mL    Sodium Bicarbonate: 600 mL  Total IN: 907.8 mL    OUT:    Indwelling Catheter - Urethral (mL): 35 mL    Other (mL): 617 mL  Total OUT: 652 mL    Total NET: 255.8 mL            PHYSICAL EXAM:  Constitutional: On NRB, warming blanket  Neck: No JVD  Respiratory: CTA  Cardiovascular: S1, S2, RRR  Gastrointestinal: BS+, soft, NT/ND  Extremities: No peripheral edema  Neurological: Lethargic  : has marcum.   Skin: No rashes  Vascular Access: [place]    LABS:  08-22    165<HH>  |  102  |  138<HH>  ----------------------------<  230<H>  5.2<H>   |  5<LL>  |  4.7<HH>    Ca    9.2      22 Aug 2024 07:07  Phos  8.0     08-22  Mg     2.2     08-22    TPro  4.6<L>  /  Alb  2.4<L>  /  TBili  0.2  /  DBili  <0.2  /  AST  353<H>  /  ALT  254<H>  /  AlkPhos  80  08-21    Creatinine Trend: 4.7 <--, 5.5 <--, 6.5 <--                        8.4    22.71 )-----------( 468      ( 22 Aug 2024 07:07 )             28.8     Urine Studies:  Urinalysis Basic - ( 22 Aug 2024 07:07 )    Color:  / Appearance:  / SG:  / pH:   Gluc: 230 mg/dL / Ketone:   / Bili:  / Urobili:    Blood:  / Protein:  / Nitrite:    Leuk Esterase:  / RBC:  / WBC    Sq Epi:  / Non Sq Epi:  / Bacteria:                 RADIOLOGY & ADDITIONAL STUDIES:      < from: Xray Chest 1 View-PORTABLE IMMEDIATE (Xray Chest 1 View-PORTABLE IMMEDIATE .) (08.21.24 @ 18:51) >    Stable left base opacity    < end of copied text >  < from: Xray Kidney Ureter Bladder (08.22.24 @ 10:24) >  Nonobstructive bowel gas pattern.    Study is limited in evaluation for perforation. If there remains a high   clinical concern, CT of abdomen pelvis is recommended.    There is a catheter within the rectum. There is a right-sided pelvic   central venous catheter.    The osseous structures demonstrate degenerative changes.    < end of copied text >

## 2024-08-23 NOTE — PROGRESS NOTE ADULT - SUBJECTIVE AND OBJECTIVE BOX
Patient is a 83y old  Female who presents with a chief complaint of Sepsis 2/2 UTI (22 Aug 2024 13:31)      Over Night Events:  Patient seen and examined.   on CVVH   on levo 1.2   no sedation   DNR/DNI   altered   ROS:  See HPI    PHYSICAL EXAM    ICU Vital Signs Last 24 Hrs  T(C): 33.3 (23 Aug 2024 05:00), Max: 34.3 (22 Aug 2024 20:00)  T(F): 92 (23 Aug 2024 05:00), Max: 93.7 (22 Aug 2024 20:00)  HR: 73 (23 Aug 2024 05:00) (73 - 98)  BP: 88/53 (23 Aug 2024 05:00) (85/50 - 138/64)  BP(mean): 67 (23 Aug 2024 05:00) (65 - 92)  ABP: --  ABP(mean): --  RR: 16 (23 Aug 2024 05:00) (15 - 20)  SpO2: 96% (23 Aug 2024 05:00) (71% - 100%)    O2 Parameters below as of 23 Aug 2024 03:00  Patient On (Oxygen Delivery Method): mask, nonrebreather  O2 Flow (L/min): 10          General:altered lethargic   HEENT:                Lymph Nodes: NO cervical LN   Lungs: Bilateral BS  Cardiovascular: Regular   Abdomen: Soft, Positive BS  Extremities: No clubbing   Skin: warm   Neurological:   Musculoskeletal: move all ext     I&O's Detail    22 Aug 2024 07:01  -  23 Aug 2024 07:00  --------------------------------------------------------  IN:    Heparin: 5 mL    Heparin Infusion: 56 mL    Insulin: 35 mL    IV PiggyBack: 560 mL    Norepinephrine: 208.8 mL    Norepinephrine: 929.8 mL    Sodium Bicarbonate: 3600 mL  Total IN: 5394.6 mL    OUT:    Indwelling Catheter - Urethral (mL): 55 mL    Other (mL): 5013 mL  Total OUT: 5068 mL    Total NET: 326.6 mL          LABS:                          8.9    7.51  )-----------( 365      ( 22 Aug 2024 21:37 )             29.2         22 Aug 2024 21:37    145    |  96     |  52     ----------------------------<  111    4.6     |  15     |  2.1      Ca    7.7        22 Aug 2024 21:37  Phos  8.0       22 Aug 2024 07:07  Mg     1.6       22 Aug 2024 21:37                                               PT/INR - ( 22 Aug 2024 07:30 )   PT: 18.50 sec;   INR: 1.61 ratio         PTT - ( 23 Aug 2024 05:30 )  PTT:43.0 sec                                       Urinalysis Basic - ( 22 Aug 2024 21:37 )    Color: x / Appearance: x / SG: x / pH: x  Gluc: 111 mg/dL / Ketone: x  / Bili: x / Urobili: x   Blood: x / Protein: x / Nitrite: x   Leuk Esterase: x / RBC: x / WBC x   Sq Epi: x / Non Sq Epi: x / Bacteria: x                                                                Culture - Blood (collected 21 Aug 2024 16:30)  Source: .Blood Blood-Peripheral  Preliminary Report (22 Aug 2024 22:02):    No growth at 24 hours    Culture - Blood (collected 21 Aug 2024 16:30)  Source: .Blood Blood-Peripheral  Gram Stain (22 Aug 2024 18:00):    Growth in anaerobic bottle: Gram Positive Cocci in Clusters  Preliminary Report (22 Aug 2024 18:01):    Growth in anaerobic bottle: Gram Positive Cocci in Clusters    Direct identification is available within approximately 3-5    hours either by Blood Panel Multiplexed PCR or Direct    MALDI-TOF. Details: [email or web address]  Organism: Blood Culture PCR (22 Aug 2024 19:23)  Organism: Blood Culture PCR (22 Aug 2024 19:23)    Urinalysis with Rflx Culture (collected 21 Aug 2024 16:30)                                                                                       ABG - ( 22 Aug 2024 22:51 )  pH, Arterial: 7.30  pH, Blood: x     /  pCO2: 24    /  pO2: 108   / HCO3: 12    / Base Excess: -12.8 /  SaO2: 98.5                MEDICATIONS  (STANDING):  chlorhexidine 2% Cloths 1 Application(s) Topical <User Schedule>  CRRT Treatment    <Continuous>  dextrose 50% Injectable 50 milliLiter(s) IV Push every 15 minutes  heparin  Infusion. 400 Unit(s)/Hr (4 mL/Hr) IV Continuous <Continuous>  insulin regular Infusion 6 Unit(s)/Hr (6 mL/Hr) IV Continuous <Continuous>  memantine 10 milliGRAM(s) Oral two times a day  meropenem  IVPB 1000 milliGRAM(s) IV Intermittent every 8 hours  norepinephrine Infusion 0.05 MICROgram(s)/kG/Min (2.92 mL/Hr) IV Continuous <Continuous>  pantoprazole  Injectable 40 milliGRAM(s) IV Push daily  PARoxetine 30 milliGRAM(s) Oral daily  PureFlow Dialysate RFP-400 (K 2 / Ca 3) 5000 milliLiter(s) (2000 mL/Hr) CRRT <Continuous>  rosuvastatin 10 milliGRAM(s) Oral at bedtime  sodium bicarbonate  Infusion 0.708 mEq/kG/Hr (150 mL/Hr) IV Continuous <Continuous>  thiamine IVPB 500 milliGRAM(s) IV Intermittent every 8 hours    MEDICATIONS  (PRN):  albuterol/ipratropium for Nebulization 3 milliLiter(s) Nebulizer every 6 hours PRN Shortness of Breath and/or Wheezing  sodium chloride 0.9% lock flush 10 milliLiter(s) IV Push every 1 hour PRN Pre/post blood products, medications, blood draw, and to maintain line patency          Xrays:  TLC:  OG:  ET tube:                                                                                    small left lower opacity / effusion    ECHO:  [facility] ICU:

## 2024-08-23 NOTE — PROGRESS NOTE ADULT - CONVERSATION DETAILS
Spoke with patient's daughter and granddaughter at the bedside.  They were able to provide a medical update and aware that patient was actively dying, but requested not to de-escalate care including removal of pressors until  arrived. Patient was off CVVHD by primary team earlier in the day and still requiring pressor support.  Support rendered        Treatment Guidelines:  -DNR/DNI  -considering CMO  -ongoing medical management for now

## 2024-08-23 NOTE — DIETITIAN INITIAL EVALUATION ADULT - LAB (SPECIFY)
8/23: H/H 8.7 / 28.9 (L), Chloride 93 (L), BUN 35 (H), Ca 7.5 (L), Alk Phos 118 (H), AST/SGOT 656 (H), ALT/SGPT 315 (H), eGFR 34 (L), Mg 2.6 (H)

## 2024-08-23 NOTE — PROGRESS NOTE ADULT - SUBJECTIVE AND OBJECTIVE BOX
INFECTIOUS DISEASE FOLLOW UP NOTE:    Interval History/ROS: Patient is a 83y old  Female who presents with a chief complaint of Sepsis 2/2 UTI (23 Aug 2024 07:12)      Overnight events:    REVIEW OF SYSTEMS:        Prior hospital charts reviewed [Yes]  Primary team notes reviewed [Yes]  Other consultant notes reviewed [Yes]    Allergies:  No Known Allergies      ANTIMICROBIALS:   meropenem  IVPB 1000 every 8 hours      OTHER MEDS: MEDICATIONS  (STANDING):  albuterol/ipratropium for Nebulization 3 every 6 hours PRN  dextrose 50% Injectable 50 every 15 minutes  heparin  Infusion. 400 <Continuous>  insulin regular Infusion 6 <Continuous>  memantine 10 two times a day  norepinephrine Infusion 0.05 <Continuous>  pantoprazole  Injectable 40 daily  PARoxetine 30 daily  rosuvastatin 10 at bedtime      Vital Signs Last 24 Hrs  T(F): 91.8 (08-23-24 @ 07:00), Max: 97.3 (08-21-24 @ 15:52)    Vital Signs Last 24 Hrs  HR: 77 (08-23-24 @ 07:00) (73 - 96)  BP: 105/52 (08-23-24 @ 07:00) (85/50 - 138/64)  RR: 16 (08-23-24 @ 07:00)  SpO2: 99% (08-23-24 @ 07:00) (71% - 100%)  Wt(kg): --    EXAM:      Labs:                        8.9    7.51  )-----------( 365      ( 22 Aug 2024 21:37 )             29.2     08-23    140  |  93<L>  |  35<H>  ----------------------------<  87  4.6   |  14<L>  |  1.5    Ca    7.5<L>      23 Aug 2024 05:30  Phos  4.5     08-23  Mg     2.6     08-23    TPro  4.3<L>  /  Alb  2.4<L>  /  TBili  0.3  /  DBili  x   /  AST  656<H>  /  ALT  315<H>  /  AlkPhos  118<H>  08-23      WBC Trend:  WBC Count: 7.51 (08-22-24 @ 21:37)  WBC Count: 7.38 (08-22-24 @ 20:17)  WBC Count: 9.31 (08-22-24 @ 16:25)  WBC Count: 11.62 (08-22-24 @ 15:24)      Creatine Trend:  Creatinine: 1.5 (08-23)  Creatinine: 2.1 (08-22)  Creatinine: 2.2 (08-22)  Creatinine: 2.8 (08-22)      Liver Biochemical Testing Trend:  Alanine Aminotransferase (ALT/SGPT): 315 *H* (08-23)  Alanine Aminotransferase (ALT/SGPT): 254 *H* (08-21)  Alanine Aminotransferase (ALT/SGPT): 15 (08-21)  Alanine Aminotransferase (ALT/SGPT): 14 (05-14)  Alanine Aminotransferase (ALT/SGPT): 13 (05-13)  Aspartate Aminotransferase (AST/SGOT): 656 (08-23-24 @ 05:30)  Aspartate Aminotransferase (AST/SGOT): 353 (08-21-24 @ 21:30)  Aspartate Aminotransferase (AST/SGOT): 22 (08-21-24 @ 16:30)  Aspartate Aminotransferase (AST/SGOT): 14 (05-14-24 @ 06:25)  Aspartate Aminotransferase (AST/SGOT): 13 (05-13-24 @ 00:43)  Bilirubin Total: 0.3 (08-23)  Bilirubin Total: 0.2 (08-21)  Bilirubin Direct: <0.2 (08-21)  Bilirubin Total: 0.2 (08-21)  Bilirubin Total: 0.2 (05-14)      Trend LDH      Urinalysis Basic - ( 23 Aug 2024 05:30 )    Color: x / Appearance: x / SG: x / pH: x  Gluc: 87 mg/dL / Ketone: x  / Bili: x / Urobili: x   Blood: x / Protein: x / Nitrite: x   Leuk Esterase: x / RBC: x / WBC x   Sq Epi: x / Non Sq Epi: x / Bacteria: x        MICROBIOLOGY:    MRSA PCR Result.: Negative (08-22-24 @ 11:30)  MRSA PCR Result.: Negative (05-13-24 @ 02:08)  MRSA PCR Result.: Negative (05-02-24 @ 23:22)      Culture - Blood (collected 21 Aug 2024 16:30)  Source: .Blood Blood-Peripheral  Preliminary Report:    No growth at 24 hours    Culture - Blood (collected 21 Aug 2024 16:30)  Source: .Blood Blood-Peripheral  Preliminary Report:    Growth in anaerobic bottle: Gram Positive Cocci in Clusters    Direct identification is available within approximately 3-5    hours either by Blood Panel Multiplexed PCR or Direct    MALDI-TOF. Details: [email or web address]  Organism: Blood Culture PCR  Organism: Blood Culture PCR    Sensitivities:      Method Type: PCR      -  Staphylococcus epidermidis, Methicillin resistant: Detec    Culture - Urine (collected 21 Aug 2024 16:30)  Source: Catheterized None  Preliminary Report:    >100,000 CFU/ml Escherichia coli    Urinalysis with Rflx Culture (collected 21 Aug 2024 16:30)    Urinalysis with Rflx Culture (collected 13 May 2024 06:23)    Culture - Urine (collected 13 May 2024 06:23)  Source: Clean Catch None  Final Report:    >100,000 CFU/ml Escherichia coli ESBL  Organism: Escherichia coli ESBL  Organism: Escherichia coli ESBL    Sensitivities:      Method Type: LEAH      -  Amoxicillin/Clavulanic Acid: I 16/8      -  Ampicillin: R >16 These ampicillin results predict results for amoxicillin      -  Ampicillin/Sulbactam: R >16/8      -  Aztreonam: R >16      -  Cefazolin: R >16 For uncomplicated UTI with K. pneumoniae, E. coli, or P. mirablis: LEAH <=16 is sensitive and LEAH >=32 is resistant. This also predicts results for oral agents cefaclor, cefdinir, cefpodoxime, cefprozil, cefuroxime axetil, cephalexin and locarbef for uncomplicated UTI. Note that some isolates may be susceptible to these agents while testing resistant to cefazolin.      -  Cefepime: R >16      -  Ceftriaxone: R >32      -  Cefuroxime: R >16      -  Ciprofloxacin: R >2      -  Ertapenem: S <=0.5      -  Gentamicin: S <=2      -  Imipenem: S <=1      -  Levofloxacin: R >4      -  Meropenem: S <=1      -  Nitrofurantoin: S <=32 Should not be used to treat pyelonephritis      -  Piperacillin/Tazobactam: S <=8      -  Tobramycin: R >8      -  Trimethoprim/Sulfamethoxazole: R >2/38    Culture - Blood (collected 13 May 2024 00:43)  Source: .Blood Blood  Final Report:    No growth at 5 days    Culture - Blood (collected 13 May 2024 00:43)  Source: .Blood Blood  Final Report:    No growth at 5 days    Urinalysis with Rflx Culture (collected 01 May 2024 10:40)    Culture - Urine (collected 01 May 2024 10:40)  Source: Catheterized None  Final Report:    >=3 organisms. Probable collection contamination.    Procalcitonin: 5.88 (08-22)    Troponin T, High Sensitivity Result: 233 (08-22)  Troponin T, High Sensitivity Result: 190 (08-21)  Troponin T, High Sensitivity Result: 217 (08-21)  Troponin T, High Sensitivity Result: 256 (08-21)    Blood Gas Arterial, Lactate: >16.0 (08-21 @ 21:15)  Blood Gas Venous - Lactate: >16.0 (08-21 @ 18:39)  Blood Gas Venous - Lactate: 13.6 (08-21 @ 17:12)      RADIOLOGY:  imaging below personally reviewed    < from: Xray Kidney Ureter Bladder (08.22.24 @ 10:24) >  Findings/  impression:    Nonobstructive bowel gas pattern.    Study is limited in evaluation for perforation. If there remains a high   clinical concern, CT of abdomen pelvis is recommended.    There is a catheter within the rectum. There is a right-sided pelvic   central venous catheter.    The osseous structures demonstrate degenerative changes.    < end of copied text >   INFECTIOUS DISEASE FOLLOW UP NOTE:    Interval History/ROS: Patient is a 83y old  Female who presents with a chief complaint of Sepsis 2/2 UTI (23 Aug 2024 07:12)    Overnight events: Increased pressor support this morning. Hypotensive. CVVHD stopped. Minimally responsive    REVIEW OF SYSTEMS:  Unable to provide due to cognitive impairment      Prior hospital charts reviewed [Yes]  Primary team notes reviewed [Yes]  Other consultant notes reviewed [Yes]    Allergies:  No Known Allergies      ANTIMICROBIALS:   meropenem  IVPB 1000 every 8 hours      OTHER MEDS: MEDICATIONS  (STANDING):  albuterol/ipratropium for Nebulization 3 every 6 hours PRN  dextrose 50% Injectable 50 every 15 minutes  heparin  Infusion. 400 <Continuous>  insulin regular Infusion 6 <Continuous>  memantine 10 two times a day  norepinephrine Infusion 0.05 <Continuous>  pantoprazole  Injectable 40 daily  PARoxetine 30 daily  rosuvastatin 10 at bedtime      Vital Signs Last 24 Hrs  T(F): 91.8 (08-23-24 @ 07:00), Max: 97.3 (08-21-24 @ 15:52)    Vital Signs Last 24 Hrs  HR: 77 (08-23-24 @ 07:00) (73 - 96)  BP: 105/52 (08-23-24 @ 07:00) (85/50 - 138/64)  RR: 16 (08-23-24 @ 07:00)  SpO2: 99% (08-23-24 @ 07:00) (71% - 100%)  Wt(kg): --    EXAM:  GENERAL: Lethargic, lying in bed  HEAD: No head lesions  EYES: Conjunctiva pink and cornea white  EAR, NOSE, MOUTH, THROAT: Normal external ears and nose, no discharges; dry mucous membranes; + NRB  NECK: Supple, nontender to palpation; no JVD  RESPIRATORY: Bibasilar crackles  CARDIOVASCULAR: S1 S2  GASTROINTESTINAL: Soft, nontender, nondistended; normoactive bowel sounds  GENITOURINARY: + marcum catheter  EXTREMITIES: No clubbing, cyanosis, or petal edema  NERVOUS SYSTEM: Minimally responsive   MUSCULOSKELETAL: No joint erythema, swelling  SKIN: Hyperpigmentation on bilateral LE, liverdo verticularis no superficial thrombophlebitis  PSYCH: Minimally responsive  LINE: RIJ TLC, R fem [place]      Labs:                        8.9    7.51  )-----------( 365      ( 22 Aug 2024 21:37 )             29.2     08-23    140  |  93<L>  |  35<H>  ----------------------------<  87  4.6   |  14<L>  |  1.5    Ca    7.5<L>      23 Aug 2024 05:30  Phos  4.5     08-23  Mg     2.6     08-23    TPro  4.3<L>  /  Alb  2.4<L>  /  TBili  0.3  /  DBili  x   /  AST  656<H>  /  ALT  315<H>  /  AlkPhos  118<H>  08-23      WBC Trend:  WBC Count: 7.51 (08-22-24 @ 21:37)  WBC Count: 7.38 (08-22-24 @ 20:17)  WBC Count: 9.31 (08-22-24 @ 16:25)  WBC Count: 11.62 (08-22-24 @ 15:24)      Creatine Trend:  Creatinine: 1.5 (08-23)  Creatinine: 2.1 (08-22)  Creatinine: 2.2 (08-22)  Creatinine: 2.8 (08-22)      Liver Biochemical Testing Trend:  Alanine Aminotransferase (ALT/SGPT): 315 *H* (08-23)  Alanine Aminotransferase (ALT/SGPT): 254 *H* (08-21)  Alanine Aminotransferase (ALT/SGPT): 15 (08-21)  Alanine Aminotransferase (ALT/SGPT): 14 (05-14)  Alanine Aminotransferase (ALT/SGPT): 13 (05-13)  Aspartate Aminotransferase (AST/SGOT): 656 (08-23-24 @ 05:30)  Aspartate Aminotransferase (AST/SGOT): 353 (08-21-24 @ 21:30)  Aspartate Aminotransferase (AST/SGOT): 22 (08-21-24 @ 16:30)  Aspartate Aminotransferase (AST/SGOT): 14 (05-14-24 @ 06:25)  Aspartate Aminotransferase (AST/SGOT): 13 (05-13-24 @ 00:43)  Bilirubin Total: 0.3 (08-23)  Bilirubin Total: 0.2 (08-21)  Bilirubin Direct: <0.2 (08-21)  Bilirubin Total: 0.2 (08-21)  Bilirubin Total: 0.2 (05-14)      Trend LDH      Urinalysis Basic - ( 23 Aug 2024 05:30 )    Color: x / Appearance: x / SG: x / pH: x  Gluc: 87 mg/dL / Ketone: x  / Bili: x / Urobili: x   Blood: x / Protein: x / Nitrite: x   Leuk Esterase: x / RBC: x / WBC x   Sq Epi: x / Non Sq Epi: x / Bacteria: x        MICROBIOLOGY:    MRSA PCR Result.: Negative (08-22-24 @ 11:30)  MRSA PCR Result.: Negative (05-13-24 @ 02:08)  MRSA PCR Result.: Negative (05-02-24 @ 23:22)      Culture - Blood (collected 21 Aug 2024 16:30)  Source: .Blood Blood-Peripheral  Preliminary Report:    No growth at 24 hours    Culture - Blood (collected 21 Aug 2024 16:30)  Source: .Blood Blood-Peripheral  Preliminary Report:    Growth in anaerobic bottle: Gram Positive Cocci in Clusters    Direct identification is available within approximately 3-5    hours either by Blood Panel Multiplexed PCR or Direct    MALDI-TOF. Details: [email or web address]  Organism: Blood Culture PCR  Organism: Blood Culture PCR    Sensitivities:      Method Type: PCR      -  Staphylococcus epidermidis, Methicillin resistant: Detec    Culture - Urine (collected 21 Aug 2024 16:30)  Source: Catheterized None  Preliminary Report:    >100,000 CFU/ml Escherichia coli    Urinalysis with Rflx Culture (collected 21 Aug 2024 16:30)    Urinalysis with Rflx Culture (collected 13 May 2024 06:23)    Culture - Urine (collected 13 May 2024 06:23)  Source: Clean Catch None  Final Report:    >100,000 CFU/ml Escherichia coli ESBL  Organism: Escherichia coli ESBL  Organism: Escherichia coli ESBL    Sensitivities:      Method Type: LEAH      -  Amoxicillin/Clavulanic Acid: I 16/8      -  Ampicillin: R >16 These ampicillin results predict results for amoxicillin      -  Ampicillin/Sulbactam: R >16/8      -  Aztreonam: R >16      -  Cefazolin: R >16 For uncomplicated UTI with K. pneumoniae, E. coli, or P. mirablis: LEAH <=16 is sensitive and LEAH >=32 is resistant. This also predicts results for oral agents cefaclor, cefdinir, cefpodoxime, cefprozil, cefuroxime axetil, cephalexin and locarbef for uncomplicated UTI. Note that some isolates may be susceptible to these agents while testing resistant to cefazolin.      -  Cefepime: R >16      -  Ceftriaxone: R >32      -  Cefuroxime: R >16      -  Ciprofloxacin: R >2      -  Ertapenem: S <=0.5      -  Gentamicin: S <=2      -  Imipenem: S <=1      -  Levofloxacin: R >4      -  Meropenem: S <=1      -  Nitrofurantoin: S <=32 Should not be used to treat pyelonephritis      -  Piperacillin/Tazobactam: S <=8      -  Tobramycin: R >8      -  Trimethoprim/Sulfamethoxazole: R >2/38    Culture - Blood (collected 13 May 2024 00:43)  Source: .Blood Blood  Final Report:    No growth at 5 days    Culture - Blood (collected 13 May 2024 00:43)  Source: .Blood Blood  Final Report:    No growth at 5 days    Urinalysis with Rflx Culture (collected 01 May 2024 10:40)    Culture - Urine (collected 01 May 2024 10:40)  Source: Catheterized None  Final Report:    >=3 organisms. Probable collection contamination.    Procalcitonin: 5.88 (08-22)    Troponin T, High Sensitivity Result: 233 (08-22)  Troponin T, High Sensitivity Result: 190 (08-21)  Troponin T, High Sensitivity Result: 217 (08-21)  Troponin T, High Sensitivity Result: 256 (08-21)    Blood Gas Arterial, Lactate: >16.0 (08-21 @ 21:15)  Blood Gas Venous - Lactate: >16.0 (08-21 @ 18:39)  Blood Gas Venous - Lactate: 13.6 (08-21 @ 17:12)      RADIOLOGY:  imaging below personally reviewed    < from: Xray Chest 1 View- PORTABLE-Routine (Xray Chest 1 View- PORTABLE-Routine in AM.) (08.23.24 @ 06:58) >  Impression:    Left basilar opacity redemonstrated      < end of copied text >      < from: Xray Kidney Ureter Bladder (08.22.24 @ 10:24) >  Findings/  impression:    Nonobstructive bowel gas pattern.    Study is limited in evaluation for perforation. If there remains a high   clinical concern, CT of abdomen pelvis is recommended.    There is a catheter within the rectum. There is a right-sided pelvic   central venous catheter.    The osseous structures demonstrate degenerative changes.    < end of copied text >

## 2024-08-23 NOTE — DIETITIAN INITIAL EVALUATION ADULT - PERTINENT MEDS FT
MEDICATIONS  (STANDING):  chlorhexidine 2% Cloths 1 Application(s) Topical <User Schedule>  CRRT Treatment    <Continuous>  dextrose 50% Injectable 50 milliLiter(s) IV Push every 15 minutes  heparin  Infusion. 400 Unit(s)/Hr (4 mL/Hr) IV Continuous <Continuous>  insulin regular Infusion 6 Unit(s)/Hr (6 mL/Hr) IV Continuous <Continuous>  memantine 10 milliGRAM(s) Oral two times a day  meropenem  IVPB 1000 milliGRAM(s) IV Intermittent every 8 hours  norepinephrine Infusion 0.05 MICROgram(s)/kG/Min (2.92 mL/Hr) IV Continuous <Continuous>  pantoprazole  Injectable 40 milliGRAM(s) IV Push daily  PARoxetine 30 milliGRAM(s) Oral daily  PureFlow Dialysate RFP-400 (K 2 / Ca 3) 5000 milliLiter(s) (2000 mL/Hr) CRRT <Continuous>  rosuvastatin 10 milliGRAM(s) Oral at bedtime  sodium bicarbonate  Infusion 0.708 mEq/kG/Hr (150 mL/Hr) IV Continuous <Continuous>  thiamine IVPB 500 milliGRAM(s) IV Intermittent every 8 hours  vasopressin Infusion 0.04 Unit(s)/Min (6 mL/Hr) IV Continuous <Continuous>    MEDICATIONS  (PRN):  albuterol/ipratropium for Nebulization 3 milliLiter(s) Nebulizer every 6 hours PRN Shortness of Breath and/or Wheezing  sodium chloride 0.9% lock flush 10 milliLiter(s) IV Push every 1 hour PRN Pre/post blood products, medications, blood draw, and to maintain line patency

## 2024-08-23 NOTE — DIETITIAN INITIAL EVALUATION ADULT - OTHER INFO
83 yoF bedbound with a medical history of dementia, decubiti ulcer,  HTN, HLD and DM was brought to the hospital  for Altered mental status.     Acute renal failure with hyperkalemia/ sepsis (POA) / metabolic acidosis  worse hypotension - now on 2 pressors- not tolerating CVVH -per Nephro will d/c CVVHD    Family considering comfort measures

## 2024-08-23 NOTE — PROGRESS NOTE ADULT - ASSESSMENT
IMPRESSION:  83 Y o female with     #Toxic metabolic encephalopathy - CTH negative   #septic shock   # UTI Hx of ESBL   metformin toxicity ??   # severe HAGMA  #Lactic acidosis >16  #uremia . KORI  #shock liver  # severe Anemia   # Hyperkalemia  #hypernatremia   #hyperglycemia? DKA   #Elevated troponin/ NSTEMI         PLAN:    CNS: Avoid sedatives, c/w treatment of sepsis with MOD     HEENT: Oral care    PULMONARY:  HOB @ 45 degrees.  Aspiration precautions, Hyperventilating appropriately, DNI    ABG   cxr reviewed   CARDIOVASCULAR: continue pressors, CVVHD. Keep MAP >65, Echo   continue Bicarb drip pending ABG and BMP   cardiology eval   follow  LA   taper pressors for map 65   GI: Holding feeds for now, until patient is more awake, shock liver noted, hepatitis panel, and RUQ sono, hold off CT chest abdomen and pelvis due to unstable      RENAL:  Severe electrolyte derangements with HAGMA noted, c/w CVVHD, BMP q4, bicarb drip  renal and bladder US   renal follow up   follow lytes        INFECTIOUS DISEASE: Follow up cultures, c/w Meropenem    HEMATOLOGICAL:  DVT prophylaxis, blood transfusion for hgb <7  heparin drip follow h/h closely   ENDOCRINE: Insulin drip, IV fluids  FS q1h,      MUSCULOSKELETAL: Bed rest     ICU    DNR/DNI     very poor prognosis   case discussed with family at bed side yesterday

## 2024-08-23 NOTE — PROGRESS NOTE ADULT - SUBJECTIVE AND OBJECTIVE BOX
HPI:  83 yoF PMH Dementia/HTN/ HLD/ DM. BIBEMS for Altered mental status. Patient with septic shock and KORI on arrival requiring CVVH.  Palliative care consulted for GOC. (21 Aug 2024 20:50)    Interval History:  8/23/24: Primary team alerted Palliative that patient was planned for de-escalation of care on 8/24. Upon assessment of pt at bedside, patient was noted to be bradycardic, hypotensive with minimal respirations, not in any distress. Family aware that patient was actively dying, awaiting to de-escalate care until  came to bedside.     PERTINENT PM/SXH:   COPD (chronic obstructive pulmonary disease)    Diabetes mellitus, type 2    High blood cholesterol    Essential hypertension    Dementia    Depression      No significant past surgical history      FAMILY HISTORY:    None pertinent    ITEMS NOT CHECKED ARE NOT PRESENT    SOCIAL HISTORY:   Significant other/partner[ ]  Children[ ]  B[employer]/Spirituality:  Substance hx:  [ ]   Tobacco hx:  [ ]   Alcohol hx: [ ]   Living Situation: [ x]Home  [ ]Long term care  [ ]Rehab [ ]Other  Home Services: [ ] HHA [ ] Ankit RN [ ] Hospice  Occupation:  Home Opioid hx:  [ ] Y [ ] N [x ] I-Stop Reference No:    Reference #: [id number] - no meds     ADVANCE DIRECTIVES:     [ ] Full Code [ x] DNR  MOLST  [ ]  Living Will  [ ]   DECISION MAKER(s):  [ ] Health Care Proxy(s)  [ x] Surrogate(s)  [ ] Guardian           Name(s): Phone Number(s):  Ines Pollard      BASELINE (I)ADL(s) (prior to admission):    [place]: [ ]Total  [ ] Moderate [ ]Dependent  Palliative Performance Status Version 2:         %    http://npcrc.org/files/news/palliative_performance_scale_ppsv2.pdf    Allergies    No Known Allergies    Intolerances  MEDICATIONS  (STANDING):  chlorhexidine 2% Cloths 1 Application(s) Topical <User Schedule>  CRRT Treatment    <Continuous>  dextrose 50% Injectable 50 milliLiter(s) IV Push every 15 minutes  heparin  Infusion. 400 Unit(s)/Hr (4 mL/Hr) IV Continuous <Continuous>  insulin regular Infusion 6 Unit(s)/Hr (6 mL/Hr) IV Continuous <Continuous>  memantine 10 milliGRAM(s) Oral two times a day  meropenem  IVPB 1000 milliGRAM(s) IV Intermittent every 8 hours  norepinephrine Infusion 0.05 MICROgram(s)/kG/Min (2.92 mL/Hr) IV Continuous <Continuous>  pantoprazole  Injectable 40 milliGRAM(s) IV Push daily  PARoxetine 30 milliGRAM(s) Oral daily  PureFlow Dialysate RFP-400 (K 2 / Ca 3) 5000 milliLiter(s) (2000 mL/Hr) CRRT <Continuous>  rosuvastatin 10 milliGRAM(s) Oral at bedtime  sodium bicarbonate  Infusion 0.708 mEq/kG/Hr (150 mL/Hr) IV Continuous <Continuous>  thiamine IVPB 500 milliGRAM(s) IV Intermittent every 8 hours  vasopressin Infusion 0.04 Unit(s)/Min (6 mL/Hr) IV Continuous <Continuous>    MEDICATIONS  (PRN):  albuterol/ipratropium for Nebulization 3 milliLiter(s) Nebulizer every 6 hours PRN Shortness of Breath and/or Wheezing  sodium chloride 0.9% lock flush 10 milliLiter(s) IV Push every 1 hour PRN Pre/post blood products, medications, blood draw, and to maintain line patency      PRESENT SYMPTOMS: [x ]Unable to obtain due to poor mentation   Source if other than patient:  [ ]Family   [ ]Team     Pain: [ ]yes [ ]no  ALL PAIN ASSESSMENT COMPONENTS WERE ASKED ABOUT UNLESS OTHERWISE NOTED  QOL impact -   Location -                    Aggravating factors -  Quality -  Radiation -  Timing-  Severity (0-10 scale):  Minimal acceptable level (0-10 scale):     CPOT:    https://www.[email or web address]/[email or web address]/Critical-Care-Pain-Observation-Tool-(CPOT)    PAIN AD Score: 1  [email or web address].[place].[email or web address]/cog/painad.pdf (press ctrl +  left click to view)    Dyspnea:                           [ ]None[ ]Mild [ ]Moderate [ ]Severe     Respiratory Distress Observation Scale (RDOS): 1  A score of 0 to 2 signifies little or no respiratory distress, 3 signifies mild distress, scores 4 to 6 indicate moderate distress, and scores greater than 7 signify severe distress  [email or web address]://[email or web address]/sites/default/files/PDFS/232007-mnvtvcmczfj-rmefcbae-wavaubwwkkp-zwgut.pdf    Anxiety:                             [ ]None[ ]Mild [ ]Moderate [ ]Severe   Fatigue:                             [ ]None[ ]Mild [ ]Moderate [ ]Severe   Nausea:                             [ ]None[ ]Mild [ ]Moderate [ ]Severe   Loss of appetite:              [ ]None[ ]Mild [ ]Moderate [ ]Severe   Constipation:                    [ ]None[ ]Mild [ ]Moderate [ ]Severe    Other Symptoms:  [x ]All other review of systems negative     Palliative Performance Status Version 2:         20%    http://[facility].org/files/news/palliative_performance_scale_ppsv2.pdf    PHYSICAL EXAM:  ICU Vital Signs Last 24 Hrs  T(C): 33 (23 Aug 2024 09:00), Max: 34.3 (22 Aug 2024 20:00)  T(F): 91.4 (23 Aug 2024 09:00), Max: 93.7 (22 Aug 2024 20:00)  HR: 28 (23 Aug 2024 13:00) (27 - 93)  BP: 45/25 (23 Aug 2024 12:15) (45/25 - 135/58)  BP(mean): 31 (23 Aug 2024 12:15) (31 - 85)  ABP: --  ABP(mean): --  RR: 1 (23 Aug 2024 13:00) (1 - 20)  SpO2: 98% (23 Aug 2024 09:00) (71% - 100%)    O2 Parameters below as of 23 Aug 2024 07:00  Patient On (Oxygen Delivery Method): mask, nonrebreather  O2 Flow (L/min): 10      GENERAL:  [x ] No acute distress [ ]Lethargic  [x ]Unarousable  [ ]Verbal  [x ]Non-Verbal [ ]Cachexia    BEHAVIORAL/PSYCH:  [ ]Alert and Oriented [ ] Anxiety [ ] Delirium [ ] Agitation [ x] Calm   EYES: [ x] No scleral icterus [ ] Scleral icterus [ ] Closed  ENMT:  [ ]Dry mouth  [ ]No external oral lesions [ ] No external ear or nose lesions  CARDIOVASCULAR:  [ ]Regular [ ]Irregular [ ]Tachy [ ]Not Tachy  [ ]Abdirahman [ ] Edema [ ] No edema  PULMONARY:  [ ]Tachypnea  [ ]Audible excessive secretions [ ] No labored breathing [ ] labored breathing  GASTROINTESTINAL: [ ]Soft  [ ]Distended  [ ]Not distended [ ]Non tender [ ]Tender  MUSCULOSKELETAL: [ ]No clubbing [ ] clubbing  [ ] No cyanosis [ ] cyanosis  NEUROLOGIC: [ ]No focal deficits  [ ]Follows commands  [ x]Does not follow commands  [ ]Cognitive impairment  [ ]Dysphagia  [ ]Dysarthria  [ ]Paresis   SKIN: [ ] Jaundiced [ ] Non-jaundiced [ ]Rash [ ]No Rash [ ] Warm [ ] Dry  MISC/LINES: [ ] ET tube [ ] Trach [ ]NGT/OGT [ ]PEG [ ]Shah    LABS: reviewed by me  CBC:            8.7    8.83  )-----------( 291      ( 08-23-24 @ 05:30 )             28.9       Chem:         ( 08-23-24 @ 05:30 )    140  |  93<L>  |  35<H>  ----------------------------<  87  4.6   |  14<L>  |  1.5      Liver Functions: ( 08-23-24 @ 05:30 )  Alb: 2.4 g/dL / Pro: 4.3 g/dL / ALK PHOS: 118 U/L / ALT: 315 U/L / AST: 656 U/L / GGT: x         Type & Screen:   Bilirubin: (08-23-24 @ 05:30)  Direct: x  / Indirect: x  / Total: 0.3    TSH: (08-22-24 @ 07:07)    Serum: 4.55    T4:             RADIOLOGY & ADDITIONAL STUDIES: reviewed by me    < from: Xray Kidney Ureter Bladder (08.22.24 @ 10:24) >    Nonobstructive bowel gas pattern.    Study is limited in evaluation for perforation. If there remains a high   clinical concern, CT of abdomen pelvis is recommended.    There is a catheter within the rectum. There is a right-sided pelvic   central venous catheter.    The osseous structures demonstrate degenerative changes.    < end of copied text >      EKG: reviewed by me    < from: 12 Lead ECG (08.22.24 @ 10:29) >  Ventricular Rate 100 BPM    Atrial Rate 100 BPM    P-R Interval 152 ms    QRS Duration 72 ms    Q-T Interval 312 ms    QTC Calculation(Bazett) 402 ms    P Axis 68 degrees    R Axis -41 degrees    T Axis 206 degrees    Diagnosis Line Sinus rhythm with Premature supraventricular complexes  Left axis deviation  Low voltage QRS  Cannot rule out Anteroseptal infarct , age undetermined  Abnormal ECG    < end of copied text >      PROTEIN CALORIE MALNUTRITION PRESENT: [ ]mild [ ]moderate [ ]severe [ ]underweight [ ]morbid obesity  https://www.andeal.org/vault/2440/web/files/ONC/Table_Clinical%20Characteristics%20to%20Document%20Malnutrition-White%20JV%20et%20al%202012.pdf    Height (cm): 154.9 (08-21-24 @ 22:04), 154.9 (05-13-24 @ 14:25), 157.5 (05-01-24 @ 21:07)  Weight (kg): 62.3 (08-21-24 @ 22:04), 79.1 (05-13-24 @ 14:25), 76 (05-01-24 @ 21:07)  BMI (kg/m2): 26 (08-21-24 @ 22:04), 33 (05-13-24 @ 14:25), 30.6 (05-01-24 @ 21:07)  [ ]PPSV2 < or = to 30% [ ]significant weight loss  [ ]poor nutritional intake  [ ]anasarca      [ ]Artificial Nutrition      Palliative Care Spiritual/Emotional Screening Tool Question  Severity (0-4):                    OR                    [ x] Unable to determine. Will assess at later time if appropriate.  Score of 2 or greater indicates recommendation of Chaplaincy and/or SW referral  Chaplaincy Referral: [ ] Yes [ ] Refused [ ] Following     Caregiver Sisters:  [ ] Yes [ ] No    OR    [x ] Unable to determine. Will assess at later time if appropriate.  Social Work Referral [ ]  Patient and Family Centered Care Referral [ ]    Anticipatory Grief Present: [ ] Yes [ ] No    OR     [ x] Unable to determine. Will assess at later time if appropriate.  Social Work Referral [ ]  Patient and Family Centered Care Referral [ ]    Patient discussed with primary medical team MD  Palliative care education provided to patient and/or family

## 2024-08-23 NOTE — PROGRESS NOTE ADULT - ASSESSMENT
83yFemale with history of HTN, HLD, DM, COPD on 2L, dementia nonverbal at baseline presents with SOB.  Patient with septic shock on arrival and KORI requiring CVVHD and pressors. Palliative care consulted for GOC.    Patient's family are considering comfort measures only, patient was actively dying but are awaiting for  to arrive. Patient not observed to be in distress.  Support rendered.  All questions answered.      Will leave rec for comfort measures only for when patient's family decides on CMO.   Septic shock  #Severe HAGMA  #Lactic Acidosis  #Uremia/KORI requiring CVVHD  #Advanced dementia    Recommendations  -DNR/DNI  -ongoing medical management  -management of NRB and pressors per ICU  -family considering comfort measures after  arrives  -all questions answered and support rendered          IF patient's family decides to pursue comfort measures only, then recommendations as follows:  -MOLST will need to be filled out and placed in chart by primary team if family wishes for CMO  -No additional IVF, artificial nutrition, blood draws, dialysis, vital signs, pressors, antibiotics, escalation of oxygen, escalation of care  -comfort measures only with NRB removal  -recommend dilaudid 0.5mg IV once and ativan 0.5mg IV once 10 minutes prior to NRB removal  -recommend dilaudid 0.5mg IV q15min PRN for pain/dyspnea following NRB removal  -recommend ativan 0.5mg IV q30min PRN for anxiety/agitation  -recommend glycopyrrolate 0.2mg IV q6h PRN for secretions  -discussed with on call physician  -[phone] PRN        Problem/Recommendation - 1:  ·  Problem: Septic shock.     Problem/Recommendation - 2:  ·  Problem: Uremia.     Problem/Recommendation - 3:  ·  Problem: Advanced dementia.     Problem/Recommendation - 4:  ·  Problem: Palliative care by specialist.

## 2024-08-23 NOTE — DIETITIAN INITIAL EVALUATION ADULT - PERTINENT LABORATORY DATA
08-23    140  |  93<L>  |  35<H>  ----------------------------<  87  4.6   |  14<L>  |  1.5    Ca    7.5<L>      23 Aug 2024 05:30  Phos  4.5     08-23  Mg     2.6     08-23    TPro  4.3<L>  /  Alb  2.4<L>  /  TBili  0.3  /  DBili  x   /  AST  656<H>  /  ALT  315<H>  /  AlkPhos  118<H>  08-23  POCT Blood Glucose.: 103 mg/dL (08-23-24 @ 07:20)  A1C with Estimated Average Glucose Result: 5.6 % (08-22-24 @ 07:07)  A1C with Estimated Average Glucose Result: 7.7 % (05-13-24 @ 06:54)

## 2024-08-23 NOTE — DIETITIAN INITIAL EVALUATION ADULT - NAME AND PHONE
Milvia Donaldson, RD [phone] or via Teams    Patient is at high nutrition risk, RD to f/u in 3-5 days or PRN

## 2024-08-23 NOTE — PROGRESS NOTE ADULT - ASSESSMENT
83 year old female with pmhx of htn, hld, dm, copd on 2L NC baseline, dementia nonverbal baseline presents with family for evaluation of shortness of breath and was BIBEMS after being found hypoxic on RA and hypotensive    ID is consulted for UTI  Hypothermic on admission, WBC 21.75 > 22.71  Hypotensive on pressor  On NRB  Severe KORI and metabolic acidosis  Lactate >16  BCx 8/21 CoNS, likely procurement contamination  UA with pyuria, UCx E. coli  Previous UCx 3/2024 ESBL E. coli    CXR stable left base opacity    Antibiotics:  Vancomycin 8/21  Cefepime 8/21  Levaquin 8/21  Meropenem 8/22 ->      IMPRESSION:  Acute pyelonephritis  Possible LLL pneumonia, suspect gram negative organism  Septic shock  Acute hypoxemic respiratory failure  Acute renal failure  Transaminitis  Lactic acidosis  Immunosuppression / Immunosenescence secondary to multiple comorbidities which could result in poor clinical outcome    RECOMMENDATIONS:  - Continue IV meropenem 1g q8hrs while patient's on CVVHD; if she is off, decrease meropenem dose back to 1g q24hrs  - S/p gentamicin x 1  - Follow up BCx and UCx  - Trend lactate  - Nephro follow up  - Offloading and frequent position changes, aspiration precaution  - Trend WBC, fever curve, transaminases, creatinine daily  - Extremely poor prognosis      * THIS IS AN INCOMPLETE NOTE. FINAL RECOMMENDATION IS PENDING *   83 year old female with pmhx of htn, hld, dm, copd on 2L NC baseline, dementia nonverbal baseline presents with family for evaluation of shortness of breath and was BIBEMS after being found hypoxic on RA and hypotensive    ID is Following for UTI  Hypothermic on admission, WBC 21.75 > 22.71  Hypotensive on pressor  On NRB  Severe KORI and metabolic acidosis  Lactate >16  BCx 8/21 CoNS, likely procurement contamination  UA with pyuria, UCx E. coli  Previous UCx 3/2024 ESBL E. coli    CXR stable left base opacity    Antibiotics:  Vancomycin 8/21  Cefepime 8/21  Levaquin 8/21  Meropenem 8/22 ->      IMPRESSION:  Acute pyelonephritis  Possible LLL pneumonia, suspect gram negative organism  Septic shock  Acute hypoxemic respiratory failure  Acute renal failure  Transaminitis  Lactic acidosis  Immunosuppression / Immunosenescence secondary to multiple comorbidities which could result in poor clinical outcome    RECOMMENDATIONS:  - She is off of CVVHD due to hypotension, decrease meropenem dose back to 1g q24hrs  - S/p gentamicin x 1  - Follow up BCx and UCx  - Trend lactate  - Nephro follow up  - Offloading and frequent position changes, aspiration precaution  - Trend WBC, fever curve, transaminases, creatinine daily  - Extremely poor prognosis      Gladys Finney D.O.  Attending Physician  Division of Infectious Diseases  [facility], [place]  Please contact me via [employer] Teams

## 2024-08-23 NOTE — DIETITIAN INITIAL EVALUATION ADULT - NS FNS REASON FOR WEIGHT CHANG
weight hx per EMR:    76 kg - 167.2 lbs (5/1/24)  79.1 kg - 174.02 lbs (5/13/24)    uncertain etiology of weight loss/other (specify)

## 2024-08-23 NOTE — DISCHARGE NOTE FOR THE EXPIRED PATIENT - HOSPITAL COURSE
83 yoF PMH HTN/ HLD/ DM. Presented with Altered mental status. noted to have severe hyperkalemia/ renal failure/ metabolic acidosis. Given multiple doses Sodium bicarbonate, D50, insulin, and CaGlu. Uldall + central line placed. Evaluated by Nephro, to be started on CVVH. Treated for septic shock with broad spectrum antibiotics and pressors. Her numbers improved initially but was deteriorating clinically. Family decided DNR/DNI. Management was continued aggressively but despite that the patient continued to deteriorate and  13:13 2024.   Family at bedside informed. Attending informed.

## 2024-08-23 NOTE — PROGRESS NOTE ADULT - SUBJECTIVE AND OBJECTIVE BOX
Patient is a 83y old  Female who presents with a chief complaint of Sepsis 2/2 UTI (23 Aug 2024 10:34)      T(F): 91.4 (08-23-24 @ 09:00), Max: 93.7 (08-22-24 @ 20:00)  HR: 65 (08-23-24 @ 10:30)  BP: 114/59 (08-23-24 @ 10:30)  RR: 16 (08-23-24 @ 10:30)  SpO2: 98% (08-23-24 @ 09:00) (71% - 100%)    PHYSICAL EXAM:  GENERAL: NAD  HEAD:  Atraumatic, Normocephalic  EYES: EOMI, PERRLA, conjunctiva and sclera clear  NERVOUS SYSTEM:  Alert & Oriented X3, no focal deficits   CHEST/LUNG: Clear to percussion bilaterally; No rales, rhonchi, wheezing, or rubs  HEART: Regular rate and rhythm; No murmurs, rubs, or gallops  ABDOMEN: Soft, Nontender, Nondistended; Bowel sounds present  EXTREMITIES:  2+ Peripheral Pulses, No clubbing, cyanosis, or edema    LABS  08-23    140  |  93<L>  |  35<H>  ----------------------------<  87  4.6   |  14<L>  |  1.5    Ca    7.5<L>      23 Aug 2024 05:30  Phos  4.5     08-23  Mg     2.6     08-23    TPro  4.3<L>  /  Alb  2.4<L>  /  TBili  0.3  /  DBili  x   /  AST  656<H>  /  ALT  315<H>  /  AlkPhos  118<H>  08-23                          8.9    7.51  )-----------( 365      ( 22 Aug 2024 21:37 )             29.2     PT/INR - ( 22 Aug 2024 07:30 )   PT: 18.50 sec;   INR: 1.61 ratio         PTT - ( 23 Aug 2024 05:30 )  PTT:43.0 sec      Culture Results:   No growth at 24 hours (08-21-24)  Culture Results:   Growth in anaerobic bottle: Gram Positive Cocci in Clusters  Direct identification is available within approximately 3-5  hours either by Blood Panel Multiplexed PCR or Direct  MALDI-TOF. Details: [email or web address] (08-21-24)  Culture Results:   >100,000 CFU/ml Escherichia coli (08-21-24)    RADIOLOGY  < from: Xray Chest 1 View- PORTABLE-Routine (Xray Chest 1 View- PORTABLE-Routine in AM.) (08.23.24 @ 06:58) >  Impression:    Left basilar opacity redemonstrated      < end of copied text >  < from: CT Head No Cont (08.21.24 @ 17:23) >  IMPRESSION:    No evidence of acute intracranial pathology.    No evidence of acute intracranial hemorrhage or acute territorial infarct.    No evidence of mass or midline shift.    < end of copied text >    MEDICATIONS  (STANDING):  chlorhexidine 2% Cloths 1 Application(s) Topical <User Schedule>  CRRT Treatment    <Continuous>  dextrose 50% Injectable 50 milliLiter(s) IV Push every 15 minutes  heparin  Infusion. 400 Unit(s)/Hr (4 mL/Hr) IV Continuous <Continuous>  insulin regular Infusion 6 Unit(s)/Hr (6 mL/Hr) IV Continuous <Continuous>  memantine 10 milliGRAM(s) Oral two times a day  meropenem  IVPB 1000 milliGRAM(s) IV Intermittent every 8 hours  norepinephrine Infusion 0.05 MICROgram(s)/kG/Min (2.92 mL/Hr) IV Continuous <Continuous>  pantoprazole  Injectable 40 milliGRAM(s) IV Push daily  PARoxetine 30 milliGRAM(s) Oral daily  PureFlow Dialysate RFP-400 (K 2 / Ca 3) 5000 milliLiter(s) (2000 mL/Hr) CRRT <Continuous>  rosuvastatin 10 milliGRAM(s) Oral at bedtime  sodium bicarbonate  Infusion 0.708 mEq/kG/Hr (150 mL/Hr) IV Continuous <Continuous>  thiamine IVPB 500 milliGRAM(s) IV Intermittent every 8 hours  vasopressin Infusion 0.04 Unit(s)/Min (6 mL/Hr) IV Continuous <Continuous>    MEDICATIONS  (PRN):  albuterol/ipratropium for Nebulization 3 milliLiter(s) Nebulizer every 6 hours PRN Shortness of Breath and/or Wheezing  sodium chloride 0.9% lock flush 10 milliLiter(s) IV Push every 1 hour PRN Pre/post blood products, medications, blood draw, and to maintain line patency

## 2024-08-23 NOTE — DIETITIAN INITIAL EVALUATION ADULT - OTHER CALCULATIONS
Based on dosing weight 62.3 kg   Energy: 1246 - 1558 kcal/day (20 -25 kcal/kg)  Protein: 74 - 93 gm/day (1.2 - 1.5 gm/kg)  Fluid: 1 mL/kcal  estimated needs with consideration for age, acuity of illness, acute renal failure, stage 3 pressure injury to sacrum, critical care setting

## 2024-08-23 NOTE — PROGRESS NOTE ADULT - ASSESSMENT
83 yoF bedbound with a medical history of dementia, decubiti ulcer,  HTN, HLD and DM was brought to the hospital  for Altered mental status.     acute renal failure with hyperkalemia / sepsis (POA) / metabolic acidosis      - worse hypotension - now on 2 pressors - not tolerating CVVH   - family at bedside understand grave prognosis   - maintain DNR and DNI   - considering comfort measures   - antibiotics as per ID   - Palliative following   - 50 minutes total spent today on patient care

## 2024-08-23 NOTE — PROGRESS NOTE ADULT - ASSESSMENT
84 yo F with history of dementia (nonverbal at baseline, bedbound), DM, HTN, dyslipidemia, COPD, recently admitted for septic shock 2/2 PNA, now sent from home for increased work of breathing, possible aspiration PNA given events began during feeding with caretaker. Was found by EMS to be hypoxic with hypotension, not responding to IV hydration now requiring Levophed infusion. Labwork noted for leukocytosis, high lactate with HAGMA, and profound hyperkalemia with EKG changes.     KORI with hyperkalemia and severe met acidosis - due to septic shock  -started on CVVHD with Is=O2  Pt is not tolerating RRT any longer, kelvin d/c CVVHD  Rising pressors requirements  profound hypotension    - adjust/decrease  Abx ffor CKD 5 - w/o RRT- Merrem 500 mg - 1 gr IV qd  -cont Levophed and bicarb drip  - kidney and bladder sono r/o obstruction  #Toxic metabolic encephalopathy - CTH negative   #septic shock , UTI Hx of ESBL     Prognosis is poor  D/W Family

## 2024-08-23 NOTE — PROGRESS NOTE ADULT - SUBJECTIVE AND OBJECTIVE BOX
Nephrology progress note    Patient is seen and examined, events over the last 24 h noted .  Profound hypotension  On increasied dose of Levophed  CVVHD stopped  Allergies:  No Known Allergies    Hospital Medications:   MEDICATIONS  (STANDING):  chlorhexidine 2% Cloths 1 Application(s) Topical <User Schedule>  CRRT Treatment    <Continuous>  dextrose 50% Injectable 50 milliLiter(s) IV Push every 15 minutes  heparin  Infusion. 400 Unit(s)/Hr (4 mL/Hr) IV Continuous <Continuous>  insulin regular Infusion 6 Unit(s)/Hr (6 mL/Hr) IV Continuous <Continuous>  memantine 10 milliGRAM(s) Oral two times a day  meropenem  IVPB 1000 milliGRAM(s) IV Intermittent every 8 hours  norepinephrine Infusion 0.05 MICROgram(s)/kG/Min (2.92 mL/Hr) IV Continuous <Continuous>  pantoprazole  Injectable 40 milliGRAM(s) IV Push daily  PARoxetine 30 milliGRAM(s) Oral daily  PureFlow Dialysate RFP-400 (K 2 / Ca 3) 5000 milliLiter(s) (2000 mL/Hr) CRRT <Continuous>  rosuvastatin 10 milliGRAM(s) Oral at bedtime  sodium bicarbonate  Infusion 0.708 mEq/kG/Hr (150 mL/Hr) IV Continuous <Continuous>  thiamine IVPB 500 milliGRAM(s) IV Intermittent every 8 hours  vasopressin Infusion 0.04 Unit(s)/Min (6 mL/Hr) IV Continuous <Continuous>        VITALS:  T(F): 91.4 (08-23-24 @ 09:00), Max: 93.7 (08-22-24 @ 20:00)  HR: 63 (08-23-24 @ 10:15)  BP: 124/55 (08-23-24 @ 10:15)  RR: 17 (08-23-24 @ 10:15)  SpO2: 98% (08-23-24 @ 09:00)  Wt(kg): --    08-21 @ 07:01  -  08-22 @ 07:00  --------------------------------------------------------  IN: 3927 mL / OUT: 90 mL / NET: 3837 mL    08-22 @ 07:01 - 08-23 @ 07:00  --------------------------------------------------------  IN: 5394.6 mL / OUT: 5266 mL / NET: 128.6 mL    08-23 @ 07:01 - 08-23 @ 10:35  --------------------------------------------------------  IN: 714.2 mL / OUT: 547 mL / NET: 167.2 mL          PHYSICAL EXAM:  Constitutional: On NRB  Neck: No JVD  Respiratory: BS+  Cardiovascular: S1, S2, RRR  Gastrointestinal: BS+, soft, NT/ND  Extremities: +peripheral edema  Neurological: Lethargic  : +marcum.   Skin: Mottled on LE  Vascular Access:  UDall    LABS:  08-23    140  |  93<L>  |  35<H>  ----------------------------<  87  4.6   |  14<L>  |  1.5    Ca    7.5<L>      23 Aug 2024 05:30  Phos  4.5     08-23  Mg     2.6     08-23    TPro  4.3<L>  /  Alb  2.4<L>  /  TBili  0.3  /  DBili      /  AST  656<H>  /  ALT  315<H>  /  AlkPhos  118<H>  08-23                          8.9    7.51  )-----------( 365      ( 22 Aug 2024 21:37 )             29.2       Urine Studies:  Urinalysis Basic - ( 23 Aug 2024 05:30 )    Color:  / Appearance:  / SG:  / pH:   Gluc: 87 mg/dL / Ketone:   / Bili:  / Urobili:    Blood:  / Protein:  / Nitrite:    Leuk Esterase:  / RBC:  / WBC    Sq Epi:  / Non Sq Epi:  / Bacteria:         RADIOLOGY & ADDITIONAL STUDIES:

## 2024-08-25 LAB
-  AMPICILLIN/SULBACTAM: SIGNIFICANT CHANGE UP
-  AMPICILLIN: SIGNIFICANT CHANGE UP
-  AZTREONAM: SIGNIFICANT CHANGE UP
-  CEFAZOLIN: SIGNIFICANT CHANGE UP
-  CEFEPIME: SIGNIFICANT CHANGE UP
-  CEFTRIAXONE: SIGNIFICANT CHANGE UP
-  CEFUROXIME: SIGNIFICANT CHANGE UP
-  CIPROFLOXACIN: SIGNIFICANT CHANGE UP
-  ERTAPENEM: SIGNIFICANT CHANGE UP
-  GENTAMICIN: SIGNIFICANT CHANGE UP
-  IMIPENEM: SIGNIFICANT CHANGE UP
-  LEVOFLOXACIN: SIGNIFICANT CHANGE UP
-  MEROPENEM: SIGNIFICANT CHANGE UP
-  NITROFURANTOIN: SIGNIFICANT CHANGE UP
-  PIPERACILLIN/TAZOBACTAM: SIGNIFICANT CHANGE UP
-  TOBRAMYCIN: SIGNIFICANT CHANGE UP
-  TRIMETHOPRIM/SULFAMETHOXAZOLE: SIGNIFICANT CHANGE UP
CULTURE RESULTS: ABNORMAL
CULTURE RESULTS: ABNORMAL
METHOD TYPE: SIGNIFICANT CHANGE UP
ORGANISM # SPEC MICROSCOPIC CNT: ABNORMAL
ORGANISM # SPEC MICROSCOPIC CNT: ABNORMAL
ORGANISM # SPEC MICROSCOPIC CNT: SIGNIFICANT CHANGE UP
ORGANISM # SPEC MICROSCOPIC CNT: SIGNIFICANT CHANGE UP
SPECIMEN SOURCE: SIGNIFICANT CHANGE UP
SPECIMEN SOURCE: SIGNIFICANT CHANGE UP

## 2024-08-26 LAB
CULTURE RESULTS: SIGNIFICANT CHANGE UP
SPECIMEN SOURCE: SIGNIFICANT CHANGE UP

## 2024-08-27 DIAGNOSIS — J15.69 PNEUMONIA DUE TO OTHER GRAM-NEGATIVE BACTERIA: ICD-10-CM

## 2024-08-27 DIAGNOSIS — N17.9 ACUTE KIDNEY FAILURE, UNSPECIFIED: ICD-10-CM

## 2024-08-27 DIAGNOSIS — Z74.01 BED CONFINEMENT STATUS: ICD-10-CM

## 2024-08-27 DIAGNOSIS — Z66 DO NOT RESUSCITATE: ICD-10-CM

## 2024-08-27 DIAGNOSIS — D63.1 ANEMIA IN CHRONIC KIDNEY DISEASE: ICD-10-CM

## 2024-08-27 DIAGNOSIS — G92.9 UNSPECIFIED TOXIC ENCEPHALOPATHY: ICD-10-CM

## 2024-08-27 DIAGNOSIS — Z99.81 DEPENDENCE ON SUPPLEMENTAL OXYGEN: ICD-10-CM

## 2024-08-27 DIAGNOSIS — N18.5 CHRONIC KIDNEY DISEASE, STAGE 5: ICD-10-CM

## 2024-08-27 DIAGNOSIS — Z11.52 ENCOUNTER FOR SCREENING FOR COVID-19: ICD-10-CM

## 2024-08-27 DIAGNOSIS — E87.0 HYPEROSMOLALITY AND HYPERNATREMIA: ICD-10-CM

## 2024-08-27 DIAGNOSIS — N10 ACUTE PYELONEPHRITIS: ICD-10-CM

## 2024-08-27 DIAGNOSIS — E78.00 PURE HYPERCHOLESTEROLEMIA, UNSPECIFIED: ICD-10-CM

## 2024-08-27 DIAGNOSIS — D84.81 IMMUNODEFICIENCY DUE TO CONDITIONS CLASSIFIED ELSEWHERE: ICD-10-CM

## 2024-08-27 DIAGNOSIS — I21.4 NON-ST ELEVATION (NSTEMI) MYOCARDIAL INFARCTION: ICD-10-CM

## 2024-08-27 DIAGNOSIS — Z79.899 OTHER LONG TERM (CURRENT) DRUG THERAPY: ICD-10-CM

## 2024-08-27 DIAGNOSIS — E87.20 ACIDOSIS, UNSPECIFIED: ICD-10-CM

## 2024-08-27 DIAGNOSIS — L89.153 PRESSURE ULCER OF SACRAL REGION, STAGE 3: ICD-10-CM

## 2024-08-27 DIAGNOSIS — B96.20 UNSPECIFIED ESCHERICHIA COLI [E. COLI] AS THE CAUSE OF DISEASES CLASSIFIED ELSEWHERE: ICD-10-CM

## 2024-08-27 DIAGNOSIS — K72.00 ACUTE AND SUBACUTE HEPATIC FAILURE WITHOUT COMA: ICD-10-CM

## 2024-08-27 DIAGNOSIS — Z79.84 LONG TERM (CURRENT) USE OF ORAL HYPOGLYCEMIC DRUGS: ICD-10-CM

## 2024-08-27 DIAGNOSIS — E87.5 HYPERKALEMIA: ICD-10-CM

## 2024-08-27 DIAGNOSIS — E11.22 TYPE 2 DIABETES MELLITUS WITH DIABETIC CHRONIC KIDNEY DISEASE: ICD-10-CM

## 2024-08-27 DIAGNOSIS — F03.93 UNSPECIFIED DEMENTIA, UNSPECIFIED SEVERITY, WITH MOOD DISTURBANCE: ICD-10-CM

## 2024-08-27 DIAGNOSIS — R00.1 BRADYCARDIA, UNSPECIFIED: ICD-10-CM

## 2024-08-27 DIAGNOSIS — Z79.52 LONG TERM (CURRENT) USE OF SYSTEMIC STEROIDS: ICD-10-CM

## 2024-08-27 DIAGNOSIS — J44.9 CHRONIC OBSTRUCTIVE PULMONARY DISEASE, UNSPECIFIED: ICD-10-CM

## 2024-08-27 DIAGNOSIS — I12.0 HYPERTENSIVE CHRONIC KIDNEY DISEASE WITH STAGE 5 CHRONIC KIDNEY DISEASE OR END STAGE RENAL DISEASE: ICD-10-CM

## 2024-08-27 DIAGNOSIS — J96.01 ACUTE RESPIRATORY FAILURE WITH HYPOXIA: ICD-10-CM

## 2024-08-27 DIAGNOSIS — R65.21 SEVERE SEPSIS WITH SEPTIC SHOCK: ICD-10-CM

## 2024-08-27 DIAGNOSIS — Z79.51 LONG TERM (CURRENT) USE OF INHALED STEROIDS: ICD-10-CM

## 2024-08-27 DIAGNOSIS — A41.9 SEPSIS, UNSPECIFIED ORGANISM: ICD-10-CM

## 2025-01-31 NOTE — ED PROVIDER NOTE - NSCAREINITIATED _GEN_ER
Subjective   Patient ID: Jovan Walters is a 71 y.o. male who presents for Follow-up.  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  This required treatment.   The reflux esophagitis he has been occurring in a recurrent pattern for years. The course has been without change. The reflux is described as  moderate. The patient remains compliant on meds.. The patient takes medications in a  dsaily   fashion. Denies dysphagia hoarseness or odynophagia...   Kidney stones have remained in remission   Patient is also here for follow-up of hypertension.. Symptoms do not include headache confusion visual disturbance dizziness shortness of breath chest pain syncope or palpitations. Recent blood pressure patient has   been checking.  120/85  By report there is good compliance with treatment. Pertinent medical history includes bph..   Bph  nocturia  x 1-2  x  nightly b  Rash   neck   and  itching    Review of Systems  All other  pertinent  systems reviewed and are negative except  those  mentioned  in HPI   Objective   Physical Exam  general: alert oriented x three  HEENT hearing normal to voice  Neck supple  Lungs respirations non-labored.  Cardiovascular: no peripheral edema  Skin: warm and dry without rash  Psych: judgement and insight normal  Musculoskeletal:  ambulation normal,    lymph:negative cervical  LYMPADENOPATHY  thyroid: non palpable enlargement   Labs    In addition thyroid ultrasound was ordered in light of findings of thyroid nodule that is 7 mm right thyroid nodule now all visualized on prior CT.  Ultrasound ordered and performed 1/25 and showed right lower pole nodule 0.7 x 0.5 x 0.2 cm no significant growth differences with recommendation for follow-up imaging and year.    Labs dated 8/24 showed normal liver function studies and slightly elevated blood sugar 109 with complete blood count with no anemia hemoglobin 14.7 hematocrit 45.4 white count slightly elevated  9.48 normal platelet count with cholesterol on 7/1294 145 cholesterol HDL 52 LDL 78    Assessment/Plan   Problem List Items Addressed This Visit       BPH (benign prostatic hyperplasia)    Relevant Medications    tamsulosin (Flomax) 0.4 mg 24 hr capsule    GERD (gastroesophageal reflux disease)      The reflux esophagitis he has been occurring in a recurrent pattern for years. The course has been without change. The reflux is described as  moderate. The patient remains compliant on meds.. The patient takes medications in a  dsaily   fashion. Denies dysphagia hoarseness or odynophagia...    stable and continue meds             Relevant Medications    lansoprazole (Prevacid) 30 mg DR capsule    Other Relevant Orders    Magnesium    Vitamin B12    Hypertension      Patient is also here for follow-up of hypertension.. Symptoms do not include headache confusion visual disturbance dizziness shortness of breath chest pain syncope or palpitations. Recent blood pressure patient has   been checking.  120/85  By report there is good compliance with treatment. Pertinent medical history includes bph..   Bph  nocturia  x 1-2  x  nightly       stable and continue meds             Relevant Medications    losartan-hydrochlorothiazide (Hyzaar) 100-12.5 mg tablet    Other Relevant Orders    Comprehensive Metabolic Panel    Recurrent kidney stones     Kidney stones have remained in remission    stable and continue meds             Relevant Medications    potassium citrate CR (Urocit-K-10) 10 mEq ER tablet    Seborrhea - Primary     Rash   neck   and  itching    discussed   med   if  persistent to  derm          Relevant Medications    triamcinolone (Kenalog) 0.1 % cream    ketoconazole (NIZOral) 2 % cream                  Cynthia Mayo)